# Patient Record
Sex: FEMALE | Race: BLACK OR AFRICAN AMERICAN | Employment: PART TIME | URBAN - METROPOLITAN AREA
[De-identification: names, ages, dates, MRNs, and addresses within clinical notes are randomized per-mention and may not be internally consistent; named-entity substitution may affect disease eponyms.]

---

## 2017-12-03 ENCOUNTER — HOSPITAL ENCOUNTER (EMERGENCY)
Age: 32
Discharge: HOME | End: 2017-12-03
Attending: EMERGENCY MEDICINE
Payer: SELF-PAY

## 2017-12-03 VITALS
DIASTOLIC BLOOD PRESSURE: 85 MMHG | BODY MASS INDEX: 48.82 KG/M2 | HEART RATE: 75 BPM | HEIGHT: 65 IN | WEIGHT: 293 LBS | OXYGEN SATURATION: 100 % | RESPIRATION RATE: 16 BRPM | TEMPERATURE: 98.6 F | SYSTOLIC BLOOD PRESSURE: 133 MMHG

## 2017-12-03 DIAGNOSIS — J03.90 ACUTE TONSILLITIS, UNSPECIFIED ETIOLOGY: Primary | ICD-10-CM

## 2017-12-03 PROCEDURE — 99283 EMERGENCY DEPT VISIT LOW MDM: CPT

## 2017-12-03 PROCEDURE — 74011250637 HC RX REV CODE- 250/637: Performed by: NURSE PRACTITIONER

## 2017-12-03 RX ORDER — DEXAMETHASONE SODIUM PHOSPHATE 10 MG/ML
10 INJECTION INTRAMUSCULAR; INTRAVENOUS ONCE
Status: COMPLETED | OUTPATIENT
Start: 2017-12-03 | End: 2017-12-03

## 2017-12-03 RX ORDER — AZITHROMYCIN 250 MG/1
TABLET, FILM COATED ORAL
Qty: 6 TAB | Refills: 0 | Status: SHIPPED | OUTPATIENT
Start: 2017-12-03 | End: 2017-12-08

## 2017-12-03 RX ORDER — TRIPROLIDINE/PSEUDOEPHEDRINE 2.5MG-60MG
400 TABLET ORAL
Qty: 1 BOTTLE | Refills: 0 | Status: SHIPPED | OUTPATIENT
Start: 2017-12-03 | End: 2019-09-24 | Stop reason: ALTCHOICE

## 2017-12-03 RX ADMIN — DEXAMETHASONE SODIUM PHOSPHATE 10 MG: 10 INJECTION, SOLUTION INTRAMUSCULAR; INTRAVENOUS at 19:10

## 2017-12-03 NOTE — ED NOTES
Emergency Department Nursing Plan of Care       The Nursing Plan of Care is developed from the Nursing assessment and Emergency Department Attending provider initial evaluation. The plan of care may be reviewed in the ED Provider note.     The Plan of Care was developed with the following considerations:   Patient / Family readiness to learn indicated by:verbalized understanding  Persons(s) to be included in education: patient  Barriers to Learning/Limitations:No    Signed     James Hilliard RN    12/3/2017   6:58 PM

## 2017-12-03 NOTE — LETTER
[facility] EMERGENCY DEPT 
[address] [place] [address] [address] 
[phone] Work/School Note Date: 12/3/2017 To Whom It May concern: 
 
Jerome Bullock was seen and treated today in the emergency room by the following provider(s): 
Attending Provider: Juan Luis Buckley MD 
Physician: Juan Luis Buckley MD 
Nurse Practitioner: Gaurav Everett NP. Jerome Bullock may return to work on  12-5. Sincerely, Gaurav Everett NP

## 2017-12-04 NOTE — ED PROVIDER NOTES
Patient is a 33 y.o. female presenting with sore throat. The history is provided by the patient. No  was used. Sore Throat    This is a new problem. The current episode started more than 2 days ago. The problem has not changed since onset. There has been no fever. Pertinent negatives include no headaches, no shortness of breath, no stridor and no swollen glands. She has had no exposure to strep. She has tried nothing for the symptoms. History reviewed. No pertinent past medical history. History reviewed. No pertinent surgical history. History reviewed. No pertinent family history. Social History     Social History    Marital status: SINGLE     Spouse name: N/A    Number of children: N/A    Years of education: N/A     Occupational History    Not on file. Social History Main Topics    Smoking status: Never Smoker    Smokeless tobacco: Not on file    Alcohol use Yes    Drug use: No    Sexual activity: Not on file     Other Topics Concern    Not on file     Social History Narrative         ALLERGIES: Lactose and Pcn [penicillins]    Review of Systems   Constitutional: Negative for fatigue and fever. HENT: Positive for sore throat. Respiratory: Negative for shortness of breath, wheezing and stridor. Cardiovascular: Negative for chest pain and palpitations. Gastrointestinal: Negative for abdominal pain. Musculoskeletal: Negative for arthralgias, myalgias, neck pain and neck stiffness. Skin: Negative for pallor and rash. Neurological: Negative for dizziness, tremors, weakness and headaches. Hematological: Negative for adenopathy. Psychiatric/Behavioral: Negative for agitation and behavioral problems. All other systems reviewed and are negative.       Vitals:    12/03/17 1835   BP: 133/85   Pulse: 75   Resp: 16   Temp: 98.6 °F (37 °C)   SpO2: 100%   Weight: 137.4 kg (303 lb)   Height: 5' 5\" (1.651 m)            Physical Exam   Constitutional: She is oriented to person, place, and time. She appears well-developed and well-nourished. No distress. HENT:   Head: Normocephalic and atraumatic. Right Ear: External ear normal.   Left Ear: External ear normal.   Nose: Nose normal.   Mouth/Throat: Uvula is midline. Posterior oropharyngeal erythema present. No oropharyngeal exudate. Tonsils 2+   Eyes: Conjunctivae are normal.   Neck: Normal range of motion. Neck supple. Cardiovascular: Normal rate, regular rhythm and normal heart sounds. Pulmonary/Chest: Effort normal and breath sounds normal. No respiratory distress. She has no wheezes. Abdominal: Soft. Bowel sounds are normal. There is no tenderness. Musculoskeletal: Normal range of motion. Lymphadenopathy:     She has no cervical adenopathy. Neurological: She is alert and oriented to person, place, and time. No cranial nerve deficit. Coordination normal.   Skin: Skin is warm and dry. No rash noted. Psychiatric: She has a normal mood and affect. Her behavior is normal. Judgment and thought content normal.   Nursing note and vitals reviewed. MDM  Number of Diagnoses or Management Options  Acute tonsillitis, unspecified etiology:   Diagnosis management comments: DDX tonsillitis strep pharyngitis peritonsillar abscess       Amount and/or Complexity of Data Reviewed  Discuss the patient with other providers: yes      ED Course       Procedures    Pt has been reevaluated. There are no new complaints, changes, or physical findings at this time. Medications have been reviewed w/ pt and/or family. Pt and/or family's questions have been answered. Pt and/or family expressed good understanding of the dx/tx/rx and is in agreement with plan of care. Pt instructed and agreed to f/u w/ PCP  and to return to ED upon further deterioration. Pt is ready for discharge. LABORATORY TESTS:  No results found for this or any previous visit (from the past 12 hour(s)).     IMAGING RESULTS:  No orders to display     No results found. MEDICATIONS GIVEN:  Medications   dexamethasone (PF) (DECADRON) injection 10 mg (10 mg Oral Given 12/3/17 1910)       IMPRESSION:  1. Acute tonsillitis, unspecified etiology        PLAN:  1. Discharge Medication List as of 12/3/2017  7:49 PM      START taking these medications    Details   azithromycin (ZITHROMAX Z-ELIN) 250 mg tablet z pack as directed, Print, Disp-6 Tab, R-0      ibuprofen (ADVIL;MOTRIN) 100 mg/5 mL suspension Take 20 mL by mouth every six (6) hours as needed. , Print, Disp-1 Bottle, R-0         CONTINUE these medications which have NOT CHANGED    Details   naproxen (NAPROSYN) 500 mg tablet Take 1 Tab by mouth every twelve (12) hours as needed for Pain., Print, Disp-20 Tab, R-0      HYDROcodone-acetaminophen (NORCO) 5-325 mg per tablet Take 1 Tab by mouth every four (4) hours as needed for Pain. Max Daily Amount: 6 Tabs., Print, Disp-6 Tab, R-0           2.    Follow-up Information     Follow up With Details Comments [address] [facility] DEPT OF OTOLARYNGOLOGY In 2 days  [address] [place] [address] [address]  [phone]            Return to ED if worse

## 2017-12-04 NOTE — DISCHARGE INSTRUCTIONS
Tonsillitis: Care Instructions  Your Care Instructions    Tonsillitis is an infection of the tonsils that is caused by bacteria or a virus. The tonsils are in the back of the throat and are part of the immune system. Tonsillitis typically lasts from a few days up to a couple of weeks. Tonsillitis caused by a virus goes away on its own. Tonsillitis caused by the bacteria that causes strep throat is treated with antibiotics. You and your doctor may consider surgery to remove the tonsils (tonsillectomy) if you have serious complications or repeat infections. Follow-up care is a key part of your treatment and safety. Be sure to make and go to all appointments, and call your doctor if you are having problems. It's also a good idea to know your test results and keep a list of the medicines you take. How can you care for yourself at home? · If your doctor prescribed antibiotics, take them as directed. Do not stop taking them just because you feel better. You need to take the full course of antibiotics. · Gargle with warm salt water. This helps reduce swelling and relieve discomfort. Gargle once an hour with 1 teaspoon of salt mixed in 8 fluid ounces of warm water. · Take an over-the-counter pain medicine, such as acetaminophen (Tylenol), ibuprofen (Advil, Motrin), or naproxen (Aleve). Be safe with medicines. Read and follow all instructions on the label. No one younger than 20 should take aspirin. It has been linked to Reye syndrome, a serious illness. · Be careful when taking over-the-counter cold or flu medicines and Tylenol at the same time. Many of these medicines have acetaminophen, which is Tylenol. Read the labels to make sure that you are not taking more than the recommended dose. Too much acetaminophen (Tylenol) can be harmful. · Try an over-the-counter throat spray to relieve throat pain. · Drink plenty of fluids. Fluids may help soothe an irritated throat.  Drink warm or cool liquids (whichever feels better). These include tea, soup, and juice. · Do not smoke, and avoid secondhand smoke. Smoking can make tonsillitis worse. If you need help quitting, talk to your doctor about stop-smoking programs and medicines. These can increase your chances of quitting for good. · Use a vaporizer or humidifier to add moisture to your bedroom. Follow the directions for cleaning the machine. When should you call for help? Call your doctor now or seek immediate medical care if:  ? · Your pain gets worse on one side of your throat. ? · You have a new or higher fever. ? · You notice changes in your voice. ? · You have trouble opening your mouth. ? · You have any trouble breathing. ? · You have much more trouble swallowing. ? · You have a fever with a stiff neck or a severe headache. ? · You are sensitive to light or feel very sleepy or confused. ? Watch closely for changes in your health, and be sure to contact your doctor if:  ? · You do not get better after 2 days. Where can you learn more? Go to http://candice-jessica.info/. Enter M253 in the search box to learn more about \"Tonsillitis: Care Instructions. \"  Current as of: May 12, 2017  Content Version: 11.4  © 7151-8796 [employer], Incorporated. Care instructions adapted under license by [employer] (which disclaims liability or warranty for this information). If you have questions about a medical condition or this instruction, always ask your healthcare professional. [place] [address] any warranty or liability for your use of this information.

## 2019-05-16 ENCOUNTER — HOSPITAL ENCOUNTER (EMERGENCY)
Age: 34
Discharge: HOME | End: 2019-05-17
Attending: EMERGENCY MEDICINE
Payer: SELF-PAY

## 2019-05-16 VITALS
HEART RATE: 104 BPM | TEMPERATURE: 98.4 F | OXYGEN SATURATION: 96 % | WEIGHT: 293 LBS | SYSTOLIC BLOOD PRESSURE: 158 MMHG | DIASTOLIC BLOOD PRESSURE: 101 MMHG | HEIGHT: 65 IN | RESPIRATION RATE: 20 BRPM | BODY MASS INDEX: 48.82 KG/M2

## 2019-05-16 DIAGNOSIS — J45.21 MILD INTERMITTENT REACTIVE AIRWAY DISEASE WITH ACUTE EXACERBATION: ICD-10-CM

## 2019-05-16 DIAGNOSIS — J20.9 ACUTE BRONCHITIS, UNSPECIFIED ORGANISM: ICD-10-CM

## 2019-05-16 DIAGNOSIS — J01.00 ACUTE NON-RECURRENT MAXILLARY SINUSITIS: Primary | ICD-10-CM

## 2019-05-16 PROCEDURE — 74011636637 HC RX REV CODE- 636/637: Performed by: EMERGENCY MEDICINE

## 2019-05-16 PROCEDURE — 74011000250 HC RX REV CODE- 250: Performed by: EMERGENCY MEDICINE

## 2019-05-16 PROCEDURE — 74011250637 HC RX REV CODE- 250/637: Performed by: EMERGENCY MEDICINE

## 2019-05-16 PROCEDURE — 77030029684 HC NEB SM VOL KT MONA -A

## 2019-05-16 PROCEDURE — 99283 EMERGENCY DEPT VISIT LOW MDM: CPT

## 2019-05-16 PROCEDURE — 94640 AIRWAY INHALATION TREATMENT: CPT

## 2019-05-16 RX ORDER — BENZONATATE 100 MG/1
200 CAPSULE ORAL
Status: DISCONTINUED | OUTPATIENT
Start: 2019-05-16 | End: 2019-05-17 | Stop reason: HOSPADM

## 2019-05-16 RX ORDER — PREDNISONE 20 MG/1
60 TABLET ORAL
Status: COMPLETED | OUTPATIENT
Start: 2019-05-16 | End: 2019-05-16

## 2019-05-16 RX ORDER — IPRATROPIUM BROMIDE AND ALBUTEROL SULFATE 2.5; .5 MG/3ML; MG/3ML
6 SOLUTION RESPIRATORY (INHALATION)
Status: COMPLETED | OUTPATIENT
Start: 2019-05-16 | End: 2019-05-16

## 2019-05-16 RX ADMIN — PREDNISONE 60 MG: 20 TABLET ORAL at 23:57

## 2019-05-16 RX ADMIN — BENZONATATE 200 MG: 100 CAPSULE ORAL at 23:57

## 2019-05-16 RX ADMIN — IPRATROPIUM BROMIDE AND ALBUTEROL SULFATE 6 ML: .5; 3 SOLUTION RESPIRATORY (INHALATION) at 23:33

## 2019-05-16 NOTE — LETTER
[facility] EMERGENCY DEPT 
[address] [place] [address] [phone] Work/School Note Date: 5/16/2019 To Whom It May concern: 
 
Xochitl Calloway was seen and treated today in the emergency room by the following provider(s): 
Attending Provider: Marylen Counter, MD. Xochitl Calloway may return to work on 5/19/19. Sincerely, Era Mauro MD

## 2019-05-17 RX ORDER — CETIRIZINE HYDROCHLORIDE, PSEUDOEPHEDRINE HYDROCHLORIDE 5; 120 MG/1; MG/1
1 TABLET, FILM COATED, EXTENDED RELEASE ORAL 2 TIMES DAILY
Qty: 30 TAB | Refills: 0 | Status: SHIPPED | OUTPATIENT
Start: 2019-05-17 | End: 2019-09-24 | Stop reason: ALTCHOICE

## 2019-05-17 RX ORDER — BENZONATATE 200 MG/1
200 CAPSULE ORAL
Qty: 12 CAP | Refills: 0 | Status: SHIPPED | OUTPATIENT
Start: 2019-05-17 | End: 2019-05-24

## 2019-05-17 RX ORDER — PREDNISONE 20 MG/1
60 TABLET ORAL DAILY
Qty: 15 TAB | Refills: 0 | Status: SHIPPED | OUTPATIENT
Start: 2019-05-17 | End: 2019-05-22

## 2019-05-17 RX ORDER — IBUPROFEN 800 MG/1
800 TABLET ORAL
Qty: 20 TAB | Refills: 0 | Status: SHIPPED | OUTPATIENT
Start: 2019-05-17 | End: 2019-05-24

## 2019-05-17 RX ORDER — AZITHROMYCIN 250 MG/1
TABLET, FILM COATED ORAL
Qty: 6 TAB | Refills: 0 | Status: SHIPPED | OUTPATIENT
Start: 2019-05-17 | End: 2019-09-24 | Stop reason: ALTCHOICE

## 2019-05-17 RX ORDER — ALBUTEROL SULFATE 90 UG/1
2 AEROSOL, METERED RESPIRATORY (INHALATION)
Qty: 1 INHALER | Refills: 0 | Status: SHIPPED | OUTPATIENT
Start: 2019-05-17 | End: 2019-09-24 | Stop reason: ALTCHOICE

## 2019-05-17 NOTE — DISCHARGE INSTRUCTIONS
Patient Education        Bronchitis: Care Instructions  Your Care Instructions    Bronchitis is inflammation of the bronchial tubes, which carry air to the lungs. The tubes swell and produce mucus, or phlegm. The mucus and inflamed bronchial tubes make you cough. You may have trouble breathing. Most cases of bronchitis are caused by viruses like those that cause colds. Antibiotics usually do not help and they may be harmful. Bronchitis usually develops rapidly and lasts about 2 to 3 weeks in otherwise healthy people. Follow-up care is a key part of your treatment and safety. Be sure to make and go to all appointments, and call your doctor if you are having problems. It's also a good idea to know your test results and keep a list of the medicines you take. How can you care for yourself at home? · Take all medicines exactly as prescribed. Call your doctor if you think you are having a problem with your medicine. · Get some extra rest.  · Take an over-the-counter pain medicine, such as acetaminophen (Tylenol), ibuprofen (Advil, Motrin), or naproxen (Aleve) to reduce fever and relieve body aches. Read and follow all instructions on the label. · Do not take two or more pain medicines at the same time unless the doctor told you to. Many pain medicines have acetaminophen, which is Tylenol. Too much acetaminophen (Tylenol) can be harmful. · Take an over-the-counter cough medicine that contains dextromethorphan to help quiet a dry, hacking cough so that you can sleep. Avoid cough medicines that have more than one active ingredient. Read and follow all instructions on the label. · Breathe moist air from a humidifier, hot shower, or sink filled with hot water. The heat and moisture will thin mucus so you can cough it out. · Do not smoke. Smoking can make bronchitis worse. If you need help quitting, talk to your doctor about stop-smoking programs and medicines.  These can increase your chances of quitting for good.  When should you call for help? Call 911 anytime you think you may need emergency care. For example, call if:    · You have severe trouble breathing.    Call your doctor now or seek immediate medical care if:    · You have new or worse trouble breathing.     · You cough up dark brown or bloody mucus (sputum).     · You have a new or higher fever.     · You have a new rash.    Watch closely for changes in your health, and be sure to contact your doctor if:    · You cough more deeply or more often, especially if you notice more mucus or a change in the color of your mucus.     · You are not getting better as expected. Where can you learn more? Go to http://candiceYummlyjessica.info/. Enter H333 in the search box to learn more about \"Bronchitis: Care Instructions. \"  Current as of: September 5, 2018  Content Version: 11.9  © 0062-9798 [employer]. Care instructions adapted under license by [employer] (which disclaims liability or warranty for this information). If you have questions about a medical condition or this instruction, always ask your healthcare professional. [place] [address] any warranty or liability for your use of this information. Patient Education        Sinusitis: Care Instructions  Your Care Instructions    Sinusitis is an infection of the lining of the sinus cavities in your head. Sinusitis often follows a cold. It causes pain and pressure in your head and face. In most cases, sinusitis gets better on its own in 1 to 2 weeks. But some mild symptoms may last for several weeks. Sometimes antibiotics are needed. Follow-up care is a key part of your treatment and safety. Be sure to make and go to all appointments, and call your doctor if you are having problems. It's also a good idea to know your test results and keep a list of the medicines you take. How can you care for yourself at home?   · Take an over-the-counter pain medicine, such as acetaminophen (Tylenol), ibuprofen (Advil, Motrin), or naproxen (Aleve). Read and follow all instructions on the label. · If the doctor prescribed antibiotics, take them as directed. Do not stop taking them just because you feel better. You need to take the full course of antibiotics. · Be careful when taking over-the-counter cold or flu medicines and Tylenol at the same time. Many of these medicines have acetaminophen, which is Tylenol. Read the labels to make sure that you are not taking more than the recommended dose. Too much acetaminophen (Tylenol) can be harmful. · Breathe warm, moist air from a steamy shower, a hot bath, or a sink filled with hot water. Avoid cold, dry air. Using a humidifier in your home may help. Follow the directions for cleaning the machine. · Use saline (saltwater) nasal washes to help keep your nasal passages open and wash out mucus and bacteria. You can buy saline nose drops at a grocery store or drugstore. Or you can make your own at home by adding 1 teaspoon of salt and 1 teaspoon of baking soda to 2 cups of distilled water. If you make your own, fill a bulb syringe with the solution, insert the tip into your nostril, and squeeze gently. Nathalie Males your nose. · Put a hot, wet towel or a warm gel pack on your face 3 or 4 times a day for 5 to 10 minutes each time. · Try a decongestant nasal spray like oxymetazoline (Afrin). Do not use it for more than 3 days in a row. Using it for more than 3 days can make your congestion worse. When should you call for help?   Call your doctor now or seek immediate medical care if:    · You have new or worse swelling or redness in your face or around your eyes.     · You have a new or higher fever.    Watch closely for changes in your health, and be sure to contact your doctor if:    · You have new or worse facial pain.     · The mucus from your nose becomes thicker (like pus) or has new blood in it.     · You are not getting better as expected. Where can you learn more? Go to http://candice-jessica.info/. Enter N058 in the search box to learn more about \"Sinusitis: Care Instructions. \"  Current as of: March 27, 2018  Content Version: 11.9  © 2085-0960 [employer]. Care instructions adapted under license by [employer] (which disclaims liability or warranty for this information). If you have questions about a medical condition or this instruction, always ask your healthcare professional. [place] [address] any warranty or liability for your use of this information. Patient Education        Reactive Airway Disease: Care Instructions  Your Care Instructions    Reactive airway disease is a breathing problem that appears as wheezing, a whistling noise in your airways. It may be caused by a viral or bacterial infection, allergies, tobacco smoke, or something else in the environment. When you are around these triggers, your body releases chemicals that make the airways get tight. Reactive airway disease is a lot like asthma. Both can cause wheezing. But asthma is ongoing, while reactive airway disease may occur only now and then. Tests can be done to tell whether you have asthma. You may take the same medicines used to treat asthma. Good home care and follow-up care with your doctor can help you recover. Follow-up care is a key part of your treatment and safety. Be sure to make and go to all appointments, and call your doctor if you are having problems. It's also a good idea to know your test results and keep a list of the medicines you take. How can you care for yourself at home? · Take your medicines exactly as prescribed. Call your doctor if you think you are having a problem with your medicine. · Do not smoke or allow others to smoke around you. If you need help quitting, talk to your doctor about stop-smoking programs and medicines.  These can increase your chances of quitting for good.  · If you know what caused your wheezing (such as perfume or the odor of household chemicals), try to avoid it in the future. · Wash your hands several times a day, and consider using hand gels or wipes that contain alcohol. This can prevent colds and other infections. When should you call for help? Call 911 anytime you think you may need emergency care. For example, call if:    · You have severe trouble breathing.    Watch closely for changes in your health, and be sure to contact your doctor if:    · You cough up yellow, dark brown, or bloody mucus.     · You have a fever.     · Your wheezing gets worse. Where can you learn more? Go to http://candice-jessica.info/. Enter L627 in the search box to learn more about \"Reactive Airway Disease: Care Instructions. \"  Current as of: September 5, 2018  Content Version: 11.9  © 6024-4945 [employer]. Care instructions adapted under license by [employer] (which disclaims liability or warranty for this information). If you have questions about a medical condition or this instruction, always ask your healthcare professional. [address] any warranty or liability for your use of this information.

## 2019-05-17 NOTE — ED PROVIDER NOTES
52-year-old female with a history of bronchitis presents with chief complaint of coughing up cold since yesterday. She states last week she began to have sneezing and upper respiratory congestion then developed a cough yesterday. The cough is productive of mucus which was clear yesterday and today is yellow. She also has associated wheeze and subjective fevers. Also reports a slight headache and slight chest pain on coughing. She does have a cyst history of seasonal allergies but states she is out of her Zyrtec. No past medical history on file. No past surgical history on file. No family history on file. Social History Socioeconomic History  Marital status: SINGLE Spouse name: Not on file  Number of children: Not on file  Years of education: Not on file  Highest education level: Not on file Occupational History  Not on file Social Needs  Financial resource strain: Not on file  Food insecurity:  
  Worry: Not on file Inability: Not on file  Transportation needs:  
  Medical: Not on file Non-medical: Not on file Tobacco Use  Smoking status: Never Smoker Substance and Sexual Activity  Alcohol use: Yes  Drug use: No  
 Sexual activity: Not on file Lifestyle  Physical activity:  
  Days per week: Not on file Minutes per session: Not on file  Stress: Not on file Relationships  Social connections:  
  Talks on phone: Not on file Gets together: Not on file Attends [employer] service: Not on file Active member of club or organization: Not on file Attends meetings of clubs or organizations: Not on file Relationship status: Not on file  Intimate partner violence:  
  Fear of current or ex partner: Not on file Emotionally abused: Not on file Physically abused: Not on file Forced sexual activity: Not on file Other Topics Concern  Not on file Social History Narrative  Not on file ALLERGIES: Lactose and Pcn [penicillins] Review of Systems Constitutional: Negative. Negative for chills, fever and unexpected weight change. HENT: Positive for congestion, rhinorrhea, sinus pressure and sinus pain. Negative for trouble swallowing. Eyes: Negative for discharge. Respiratory: Positive for cough, shortness of breath and wheezing. Negative for chest tightness. Cardiovascular: Negative. Negative for chest pain. Gastrointestinal: Negative. Negative for abdominal distention, abdominal pain, constipation, diarrhea and nausea. Endocrine: Negative. Genitourinary: Negative. Negative for difficulty urinating, dysuria, frequency and urgency. Musculoskeletal: Negative. Negative for arthralgias and myalgias. Skin: Negative. Negative for color change. Allergic/Immunologic: Negative. Neurological: Negative. Negative for dizziness, speech difficulty and headaches. Hematological: Negative. Psychiatric/Behavioral: Negative. Negative for agitation and confusion. All other systems reviewed and are negative. Vitals:  
 05/16/19 2308 BP: (!) 158/101 Pulse: (!) 104 Resp: 20 Temp: 98.4 °F (36.9 °C) SpO2: 96% Weight: 139 kg (306 lb 8 oz) Height: 5' 5\" (1.651 m) Physical Exam  
HENT:  
Rhinorrhea and left maxillary sinus tenderness. Pulmonary/Chest:  
Decreased air movement by laterally with diffuse expiratory wheeze. MDM Number of Diagnoses or Management Options Diagnosis management comments: URI, bronchitis, sinusitis, reactive airway disease. ED Course as of May 17 0647 Fri May 17, 2019  
0037 Feeling better. On re-auscultation, wheezes significantly decreased and air movement increased. [SS] ED Course User Index 
[SS] Josephine Perera MD  
 
 
Procedures LABORATORY TESTS: 
No results found for this or any previous visit (from the past 12 hour(s)). IMAGING RESULTS: 
No orders to display MEDICATIONS GIVEN: 
 Medications  
predniSONE (DELTASONE) tablet 60 mg (60 mg Oral Given 5/16/19 4595)  
albuterol-ipratropium (DUO-NEB) 2.5 MG-0.5 MG/3 ML (6 mL Nebulization Given 5/16/19 3626) IMPRESSION: 
1. Acute non-recurrent maxillary sinusitis 2. Acute bronchitis, unspecified organism 3. Mild intermittent reactive airway disease with acute exacerbation PLAN: 
1. Discharge Medication List as of 5/17/2019 12:51 AM  
  
START taking these medications Details  
cetirizine-pseudoePHEDrine (ZYRTEC-D) 5-120 mg Tb12 Take 1 Tab by mouth two (2) times a day., Print, Disp-30 Tab, R-0  
  
predniSONE (DELTASONE) 20 mg tablet Take 60 mg by mouth daily for 5 days. , Print, Disp-15 Tab, R-0  
  
albuterol (PROVENTIL HFA, VENTOLIN HFA, PROAIR HFA) 90 mcg/actuation inhaler Take 2 Puffs by inhalation every four (4) hours as needed for Wheezing., Print, Disp-1 Inhaler, R-0  
  
benzonatate (TESSALON) 200 mg capsule Take 1 Cap by mouth three (3) times daily as needed for Cough for up to 7 days. , Print, Disp-12 Cap, R-0  
  
ibuprofen (MOTRIN) 800 mg tablet Take 1 Tab by mouth every six (6) hours as needed for Pain for up to 7 days. , Print, Disp-20 Tab, R-0  
  
azithromycin (ZITHROMAX Z-ELIN) 250 mg tablet 2 tabs by mouth day 1, then 1 tab by mouth daily on days 2-5, Print, Disp-6 Tab, R-0  
  
  
CONTINUE these medications which have NOT CHANGED Details  
ibuprofen (ADVIL;MOTRIN) 100 mg/5 mL suspension Take 20 mL by mouth every six (6) hours as needed. , Print, Disp-1 Bottle, R-0  
  
naproxen (NAPROSYN) 500 mg tablet Take 1 Tab by mouth every twelve (12) hours as needed for Pain., Print, Disp-20 Tab, R-0  
  
HYDROcodone-acetaminophen (NORCO) 5-325 mg per tablet Take 1 Tab by mouth every four (4) hours as needed for Pain. Max Daily Amount: 6 Tabs., Print, Disp-6 Tab, R-0  
  
  
 
2. Follow-up Information Follow up With Specialties Details Why Contact Info [facility]  Schedule an appointment as soon as possible for a visit As needed [address] [facility], [address] [place] [address] 
[phone] Return to ED if worse

## 2019-05-17 NOTE — ED NOTES
Patient  given copy of dc instructions and 6 script(s). Patient  verbalized understanding of instructions and script (s). Patient given a current medication reconciliation form and verbalized understanding of their medications. Patient  verbalized understanding of the importance of discussing medications with  his or her physician or clinic they will be following up with. Patient alert and oriented and in no acute distress. Patient discharged home ambulatory.

## 2019-05-17 NOTE — ED NOTES
Pt arrived to ED with c/o cough x 1 week. Pt states it started as a \"head cold\" but now its in her chest.  Pt is in no acute distress. Will continue to monitor. See nursing assessment. Safety precautions in place; call light within reach. Emergency Department Nursing Plan of Care The Nursing Plan of Care is developed from the Nursing assessment and Emergency Department Attending provider initial evaluation. The plan of care may be reviewed in the ED Provider note. The Plan of Care was developed with the following considerations:  
Patient / Family readiness to learn indicated by:verbalized understanding Persons(s) to be included in education: patient Barriers to Learning/Limitations:No 
 
Signed Nanci Alejandre RN   
5/16/2019   11:31 PM

## 2019-09-05 ENCOUNTER — APPOINTMENT (OUTPATIENT)
Dept: GENERAL RADIOLOGY | Age: 34
End: 2019-09-05
Attending: PHYSICIAN ASSISTANT
Payer: MEDICAID

## 2019-09-05 ENCOUNTER — HOSPITAL ENCOUNTER (EMERGENCY)
Age: 34
Discharge: HOME | End: 2019-09-05
Attending: EMERGENCY MEDICINE
Payer: MEDICAID

## 2019-09-05 VITALS
TEMPERATURE: 97.9 F | DIASTOLIC BLOOD PRESSURE: 83 MMHG | SYSTOLIC BLOOD PRESSURE: 149 MMHG | HEART RATE: 97 BPM | WEIGHT: 293 LBS | RESPIRATION RATE: 18 BRPM | BODY MASS INDEX: 48.82 KG/M2 | HEIGHT: 65 IN | OXYGEN SATURATION: 97 %

## 2019-09-05 DIAGNOSIS — M47.814 SPONDYLOSIS OF THORACIC REGION WITHOUT MYELOPATHY OR RADICULOPATHY: Primary | ICD-10-CM

## 2019-09-05 LAB — HCG UR QL: NEGATIVE

## 2019-09-05 PROCEDURE — 72070 X-RAY EXAM THORAC SPINE 2VWS: CPT

## 2019-09-05 PROCEDURE — 99283 EMERGENCY DEPT VISIT LOW MDM: CPT

## 2019-09-05 PROCEDURE — 81025 URINE PREGNANCY TEST: CPT

## 2019-09-05 PROCEDURE — 74011250637 HC RX REV CODE- 250/637: Performed by: PHYSICIAN ASSISTANT

## 2019-09-05 RX ORDER — CYCLOBENZAPRINE HCL 10 MG
10 TABLET ORAL
Status: COMPLETED | OUTPATIENT
Start: 2019-09-05 | End: 2019-09-05

## 2019-09-05 RX ORDER — PREDNISONE 10 MG/1
TABLET ORAL
Qty: 21 TAB | Refills: 0 | Status: SHIPPED | OUTPATIENT
Start: 2019-09-05 | End: 2019-09-24 | Stop reason: ALTCHOICE

## 2019-09-05 RX ORDER — CYCLOBENZAPRINE HCL 10 MG
10 TABLET ORAL
Qty: 15 TAB | Refills: 0 | Status: SHIPPED | OUTPATIENT
Start: 2019-09-05 | End: 2019-09-24 | Stop reason: ALTCHOICE

## 2019-09-05 RX ADMIN — CYCLOBENZAPRINE HYDROCHLORIDE 10 MG: 10 TABLET, FILM COATED ORAL at 21:55

## 2019-09-05 NOTE — LETTER
[facility] EMERGENCY DEPT 
[address] [facility] [address] 
[phone] Work/School Note Date: 9/5/2019 To Whom It May concern: 
 
Axel De Dios was seen and treated today in the emergency room by the following provider(s): 
Attending Provider: John Arteaga MD 
Physician Assistant: Robbie Baumgarten, PA. Please excuse her from work on September 6 and 7.  
 
Sincerely, 
 
 
 
 
JERI Escamilla

## 2019-09-06 NOTE — ED NOTES
Pt arrived to ED via ambulatory with c/o back pain \"for a very long time\". Pt. Denies injury/trauma. Lungs clear. Pt is in no acute distress. Will continue to monitor. See nursing assessment. Safety precautions in place; call light within reach. Emergency Department Nursing Plan of Care       The Nursing Plan of Care is developed from the Nursing assessment and Emergency Department Attending provider initial evaluation. The plan of care may be reviewed in the ED Provider note.     The Plan of Care was developed with the following considerations:   Patient / Family readiness to learn indicated by:verbalized understanding  Persons(s) to be included in education: patient  Barriers to Learning/Limitations:No    Signed     Laci Batres RN    9/5/2019   9:29 PM

## 2019-09-06 NOTE — DISCHARGE INSTRUCTIONS
Patient Education        Back Pain: Care Instructions  Your Care Instructions    Back pain has many possible causes. It is often related to problems with muscles and ligaments of the back. It may also be related to problems with the nerves, discs, or bones of the back. Moving, lifting, standing, sitting, or sleeping in an awkward way can strain the back. Sometimes you don't notice the injury until later. Arthritis is another common cause of back pain. Although it may hurt a lot, back pain usually improves on its own within several weeks. Most people recover in 12 weeks or less. Using good home treatment and being careful not to stress your back can help you feel better sooner. Follow-up care is a key part of your treatment and safety. Be sure to make and go to all appointments, and call your doctor if you are having problems. It's also a good idea to know your test results and keep a list of the medicines you take. How can you care for yourself at home? · Sit or lie in positions that are most comfortable and reduce your pain. Try one of these positions when you lie down:  ? Lie on your back with your knees bent and supported by large pillows. ? Lie on the floor with your legs on the seat of a sofa or chair. ? Lie on your side with your knees and hips bent and a pillow between your legs. ? Lie on your stomach if it does not make pain worse. · Do not sit up in bed, and avoid soft couches and twisted positions. Bed rest can help relieve pain at first, but it delays healing. Avoid bed rest after the first day of back pain. · Change positions every 30 minutes. If you must sit for long periods of time, take breaks from sitting. Get up and walk around, or lie in a comfortable position. · Try using a heating pad on a low or medium setting for 15 to 20 minutes every 2 or 3 hours. Try a warm shower in place of one session with the heating pad. · You can also try an ice pack for 10 to 15 minutes every 2 to 3 hours. Put a thin cloth between the ice pack and your skin. · Take pain medicines exactly as directed. ? If the doctor gave you a prescription medicine for pain, take it as prescribed. ? If you are not taking a prescription pain medicine, ask your doctor if you can take an over-the-counter medicine. · Take short walks several times a day. You can start with 5 to 10 minutes, 3 or 4 times a day, and work up to longer walks. Walk on level surfaces and avoid hills and stairs until your back is better. · Return to work and other activities as soon as you can. Continued rest without activity is usually not good for your back. · To prevent future back pain, do exercises to stretch and strengthen your back and stomach. Learn how to use good posture, safe lifting techniques, and proper body mechanics. When should you call for help? Call your doctor now or seek immediate medical care if:    · You have new or worsening numbness in your legs.     · You have new or worsening weakness in your legs. (This could make it hard to stand up.)     · You lose control of your bladder or bowels.    Watch closely for changes in your health, and be sure to contact your doctor if:    · You have a fever, lose weight, or don't feel well.     · You do not get better as expected. Where can you learn more? Go to http://candice-jessica.info/. Enter P925 in the search box to learn more about \"Back Pain: Care Instructions. \"  Current as of: September 20, 2018  Content Version: 12.1  © 8707-0966 [employer]. Care instructions adapted under license by [employer] (which disclaims liability or warranty for this information). If you have questions about a medical condition or this instruction, always ask your healthcare professional. [place] [address] any warranty or liability for your use of this information.

## 2019-09-06 NOTE — ED PROVIDER NOTES
EMERGENCY DEPARTMENT HISTORY AND PHYSICAL EXAM      Date: 9/5/2019  Patient Name: Axel De Dios    History of Presenting Illness     Chief Complaint   Patient presents with    Back Pain       History Provided By: Patient    HPI: Axel De Dios, 36 y.o. female with no pertinent past medical history, presents to the ED with cc of right low back pain. The patient reports that approximately 3 years ago, she bent over while she was doing work and felt a pop in her back. Since then, she has had intermittent pain to the area. Her current flare has been present for the last 3 months. She has been taking ibuprofen with mild relief of pain. The pain is worse with movement and twisting. She sometimes notices that the pain shoots into her right leg. She denies bowel or bladder dysfunction, saddle anesthesia, lower extremity numbness or weakness, urinary symptoms. There are no other complaints, changes, or physical findings at this time. PCP: None    No current facility-administered medications on file prior to encounter. Current Outpatient Medications on File Prior to Encounter   Medication Sig Dispense Refill    cetirizine-pseudoePHEDrine (ZYRTEC-D) 5-120 mg Tb12 Take 1 Tab by mouth two (2) times a day. 30 Tab 0    albuterol (PROVENTIL HFA, VENTOLIN HFA, PROAIR HFA) 90 mcg/actuation inhaler Take 2 Puffs by inhalation every four (4) hours as needed for Wheezing. 1 Inhaler 0    azithromycin (ZITHROMAX Z-ELIN) 250 mg tablet 2 tabs by mouth day 1, then 1 tab by mouth daily on days 2-5 6 Tab 0    ibuprofen (ADVIL;MOTRIN) 100 mg/5 mL suspension Take 20 mL by mouth every six (6) hours as needed. 1 Bottle 0    naproxen (NAPROSYN) 500 mg tablet Take 1 Tab by mouth every twelve (12) hours as needed for Pain. 20 Tab 0    HYDROcodone-acetaminophen (NORCO) 5-325 mg per tablet Take 1 Tab by mouth every four (4) hours as needed for Pain. Max Daily Amount: 6 Tabs.  6 Tab 0       Past History     Past Medical History:  History reviewed. No pertinent past medical history. Past Surgical History:  History reviewed. No pertinent surgical history. Family History:  History reviewed. No pertinent family history. Social History:  Social History     Tobacco Use    Smoking status: Never Smoker    Smokeless tobacco: Never Used   Substance Use Topics    Alcohol use: Yes     Comment: occasionally    Drug use: No       Allergies: Allergies   Allergen Reactions    Lactose Unknown (comments)    Pcn [Penicillins] Itching         Review of Systems   Review of Systems   Constitutional: Negative for chills and fever. HENT: Negative for ear pain and sore throat. Eyes: Negative for redness and visual disturbance. Respiratory: Negative for cough and shortness of breath. Cardiovascular: Negative for chest pain and palpitations. Gastrointestinal: Negative for abdominal pain, nausea and vomiting. Genitourinary: Negative for dysuria and hematuria. Musculoskeletal: Positive for back pain. Negative for gait problem. Skin: Negative for rash and wound. Neurological: Negative for dizziness and headaches. Psychiatric/Behavioral: Negative for behavioral problems and confusion. All other systems reviewed and are negative. Physical Exam   Physical Exam   Constitutional: She is oriented to person, place, and time. She appears well-developed and well-nourished. Well-appearing, interactive female in no apparent distress. HENT:   Head: Normocephalic and atraumatic. Eyes: Pupils are equal, round, and reactive to light. Conjunctivae and EOM are normal.   Neck: Normal range of motion. Neck supple. Cardiovascular: Normal rate, regular rhythm and normal heart sounds. Pulmonary/Chest: Effort normal and breath sounds normal. No respiratory distress. She has no wheezes. She has no rales. Musculoskeletal: Normal range of motion. Thoracic back: She exhibits tenderness (right lower thoracic paraspinal muscles).  She exhibits normal range of motion, no bony tenderness, no edema and no deformity. No overlying rashes to the area of pain. Neurological: She is alert and oriented to person, place, and time. She has normal strength. No cranial nerve deficit or sensory deficit. GCS eye subscore is 4. GCS verbal subscore is 5. GCS motor subscore is 6. Skin: Skin is warm and dry. No rash noted. Psychiatric: She has a normal mood and affect. Her behavior is normal.   Nursing note and vitals reviewed. Diagnostic Study Results     Labs -     Recent Results (from the past 12 hour(s))   HCG URINE, QL. - POC    Collection Time: 09/05/19  9:53 PM   Result Value Ref Range    Pregnancy test,urine (POC) NEGATIVE  NEG         Radiologic Studies -   XR SPINE THORAC 2 V   Final Result   IMPRESSION: No acute osseous abnormality. Mild multilevel degenerative   spondylosis. CT Results  (Last 48 hours)    None        CXR Results  (Last 48 hours)    None            Medical Decision Making   I am the first provider for this patient. I reviewed the vital signs, available nursing notes, past medical history, past surgical history, family history and social history. Vital Signs-Reviewed the patient's vital signs. Patient Vitals for the past 12 hrs:   Temp Pulse Resp BP SpO2   09/05/19 2131     97 %   09/05/19 2123 97.9 °F (36.6 °C) 97 18 149/83 97 %         Records Reviewed: Nursing Notes and Old Medical Records    Provider Notes (Medical Decision Making):   Patient presents with right lower thoracic pain. She has no red flag signs and neurological exam is intact. Differential diagnosis includes degenerative disc disease, osteoarthritis, muscle strain. Given duration of symptoms, plan to obtain x-ray and treat with muscle relaxer. ED Course:   Initial assessment performed. The patients presenting problems have been discussed, and they are in agreement with the care plan formulated and outlined with them.   I have encouraged them to ask questions as they arise throughout their visit. Disposition:  10:41 PM    The patient has been re-evaluated and is ready for discharge. Reviewed available results with patient. Counseled patient on diagnosis and care plan. Patient has expressed understanding, and all questions have been answered. Patient agrees with plan and agrees to follow up as recommended, or to return to the ED if their symptoms worsen. Discharge instructions have been provided and explained to the patient, along with reasons to return to the ED. PLAN:  1. Discharge Medication List as of 9/5/2019 10:37 PM      START taking these medications    Details   predniSONE (STERAPRED DS) 10 mg dose pack Follow instructions on taper pack. , Print, Disp-21 Tab, R-0      cyclobenzaprine (FLEXERIL) 10 mg tablet Take 1 Tab by mouth three (3) times daily as needed for Muscle Spasm(s). , Print, Disp-15 Tab, R-0         CONTINUE these medications which have NOT CHANGED    Details   cetirizine-pseudoePHEDrine (ZYRTEC-D) 5-120 mg Tb12 Take 1 Tab by mouth two (2) times a day., Print, Disp-30 Tab, R-0      albuterol (PROVENTIL HFA, VENTOLIN HFA, PROAIR HFA) 90 mcg/actuation inhaler Take 2 Puffs by inhalation every four (4) hours as needed for Wheezing., Print, Disp-1 Inhaler, R-0      azithromycin (ZITHROMAX Z-ELIN) 250 mg tablet 2 tabs by mouth day 1, then 1 tab by mouth daily on days 2-5, Print, Disp-6 Tab, R-0      ibuprofen (ADVIL;MOTRIN) 100 mg/5 mL suspension Take 20 mL by mouth every six (6) hours as needed. , Print, Disp-1 Bottle, R-0      naproxen (NAPROSYN) 500 mg tablet Take 1 Tab by mouth every twelve (12) hours as needed for Pain., Print, Disp-20 Tab, R-0      HYDROcodone-acetaminophen (NORCO) 5-325 mg per tablet Take 1 Tab by mouth every four (4) hours as needed for Pain. Max Daily Amount: 6 Tabs., Print, Disp-6 Tab, R-0           2.    Follow-up Information     Follow up With Specialties Details Why Contact Info [facility]  Call to schedule a follow up appointment [address] [facility] [address], [address] [place] [id number] [phone]    [facility] EMERGENCY DEPT Emergency Medicine Go to If symptoms worsen Devin Chava  [phone]        Return to ED if worse     Diagnosis     Clinical Impression:   1. Spondylosis of thoracic region without myelopathy or radiculopathy            Sanjuana Blake PA-C        This note will not be viewable in [facility].

## 2019-09-24 ENCOUNTER — OFFICE VISIT (OUTPATIENT)
Dept: INTERNAL MEDICINE CLINIC | Age: 34
End: 2019-09-24

## 2019-09-24 VITALS
HEART RATE: 73 BPM | SYSTOLIC BLOOD PRESSURE: 125 MMHG | HEIGHT: 65 IN | RESPIRATION RATE: 18 BRPM | BODY MASS INDEX: 48.82 KG/M2 | OXYGEN SATURATION: 98 % | WEIGHT: 293 LBS | DIASTOLIC BLOOD PRESSURE: 83 MMHG | TEMPERATURE: 98 F

## 2019-09-24 DIAGNOSIS — E66.01 OBESITY, MORBID (HCC): ICD-10-CM

## 2019-09-24 DIAGNOSIS — M62.838 MUSCLE SPASM: ICD-10-CM

## 2019-09-24 DIAGNOSIS — M47.814 SPONDYLOSIS OF THORACIC REGION WITHOUT MYELOPATHY OR RADICULOPATHY: Primary | ICD-10-CM

## 2019-09-24 DIAGNOSIS — Z76.89 ENCOUNTER TO ESTABLISH CARE: ICD-10-CM

## 2019-09-24 RX ORDER — ORPHENADRINE CITRATE 100 MG/1
100 TABLET, EXTENDED RELEASE ORAL
Qty: 20 TAB | Refills: 0 | Status: SHIPPED | OUTPATIENT
Start: 2019-09-24 | End: 2019-10-16 | Stop reason: SDUPTHER

## 2019-09-24 RX ORDER — MELOXICAM 15 MG/1
15 TABLET ORAL DAILY
Qty: 30 TAB | Refills: 3 | Status: SHIPPED | OUTPATIENT
Start: 2019-09-24 | End: 2020-01-27 | Stop reason: ALTCHOICE

## 2019-09-24 RX ORDER — CYCLOBENZAPRINE HCL 10 MG
10 TABLET ORAL
Qty: 15 TAB | Refills: 0 | Status: CANCELLED | OUTPATIENT
Start: 2019-09-24

## 2019-09-24 NOTE — PATIENT INSTRUCTIONS
Eat Breakfast DAILY within 30-60 minutes of WAKING and AIM to eat at least 5 TIMES DAILY, 3 meals and 2 snacks, about 2-3 hours apart. Aim to drink 165 OUNCES of water DAILY. Equal to HALF of your bodyweight. May add FRUIT, cucumbers, lemon, etc. For flavoring. Avoid SODA and JUICE. Start walking 3 TIMES WEEKLY for 20 minutes, as you get more comfortable increase your PACE and then the amount of TIME. The goal is 5 days weekly for 30 minutes. Also look up THE FAST METABOLISM DIET on ESTEFANY/AMAZON/Spool by Jaciel Ireland to review. There is also an VIOLET. Spondylolysis and Spondylolisthesis: Exercises Introduction Here are some examples of exercises for you to try. The exercises may be suggested for a condition or for rehabilitation. Start each exercise slowly. Ease off the exercises if you start to have pain. You will be told when to start these exercises and which ones will work best for you. How to do the exercises Single knee-to-chest 
 
1. Lie on your back with your knees bent and your feet flat on the floor. You can put a small pillow under your head and neck if it is more comfortable. 2. Bring one knee to your chest, keeping the other foot flat on the floor. 3. Keep your lower back pressed to the floor. Hold for 15 to 30 seconds. 4. Relax, and lower the knee to the starting position. 5. Repeat with the other leg. Repeat 2 to 4 times with each leg. 6. To get more stretch, put your other leg flat on the floor while pulling your knee to your chest. 
 
Double knee-to-chest 
 
1. Lie on your back with your knees bent and your feet flat on the floor. You can put a small pillow under your head and neck if it is more comfortable. 2. Bring both knees to your chest. 
3. Keep your lower back pressed to the floor. Hold for 15 to 30 seconds. 4. Relax, and lower your knees to the starting position. 5. Repeat 2 to 4 times. Alternate arm and leg (bird dog) exercise 1. Start on the floor, on your hands and knees. 2. Tighten your belly muscles by pulling your belly button in toward your spine. Be sure you continue to breathe normally and do not hold your breath. 3. Raise one arm off the floor, and hold it straight out in front of you. Be careful not to let your shoulder drop down, because that will twist your trunk. 4. Hold for about 6 seconds, then lower your arm and switch to your other arm. 5. Repeat 8 to 12 times on each arm. 6. When you can do this exercise with ease and no pain, repeat steps 1 through 5. But this time do it with one leg raised off the floor, holding your leg straight out behind you. Be careful not to let your hip drop down, because that will twist your trunk. 7. When holding your leg straight out becomes easier, try raising your opposite arm at the same time, and repeat steps 1 through 5. Bridging 1. Lie on your back with both knees bent. Your knees should be bent about 90 degrees. 2. Then push your feet into the floor, squeeze your buttocks, and lift your hips off the floor until your shoulders, hips, and knees are all in a straight line. 3. Hold for about 6 seconds as you continue to breathe normally, and then slowly lower your hips back down to the floor and rest for up to 10 seconds. 4. Repeat 8 to 12 times. Curl-ups 1. Lie on the floor on your back with your knees bent at a 90-degree angle. Your feet should be flat on the floor, about 12 inches from your buttocks. 2. Cross your arms over your chest. If this bothers your neck, try putting your hands behind your neck (not your head), with your elbows spread apart. 3. Slowly tighten your belly muscles and raise your shoulder blades off the floor. 4. Keep your head in line with your body, and do not press your chin to your chest. 
5. Hold this position for 1 or 2 seconds, then slowly lower yourself back down to the floor. 6. Repeat 8 to 12 times. Cristal Restrepo 1. Lie on your stomach, resting your upper body on your forearms. 2. Tighten your belly muscles by pulling your belly button in toward your spine. 3. Keeping your knees on the floor, press down with your forearms to lift your upper body off the floor. 4. Hold for about 6 seconds, then lower your body to the floor. Rest for up to 10 seconds. 5. Repeat 8 to 12 times. 6. Over time, work up to holding for 15 to 30 seconds each time. 7. If this exercise is easy to do with your knees on the floor, try doing this exercise with your knees and legs straight, supported by your toes on the floor. Follow-up care is a key part of your treatment and safety. Be sure to make and go to all appointments, and call your doctor if you are having problems. It's also a good idea to know your test results and keep a list of the medicines you take. Where can you learn more? Go to http://candice-jessica.info/. Enter [phone] in the search box to learn more about \"Spondylolysis and Spondylolisthesis: Exercises. \" Current as of: June 26, 2019 Content Version: 12.2 © 1615-2363 [employer]. Care instructions adapted under license by [employer] (which disclaims liability or warranty for this information). If you have questions about a medical condition or this instruction, always ask your healthcare professional. [place] [address] any warranty or liability for your use of this information.

## 2019-09-24 NOTE — PROGRESS NOTES
Kadi Johnson is a 36 y.o. female and presents with New Patient (here to establish care) and Back Pain (spondylosis) Subjective: 
Pt here to establish care. Pt was seen in ER on 9/5 for back pain after bending over to  items at work. Diagnosed with thoracic spondylosis. Was given prednisone and flexeril. Instructed to f/u with PCP. Reports feeling SAME AS when in ER, Pain Scale: 6/10. Review of Systems Constitutional: negative for fevers, chills, anorexia and weight loss Respiratory:  negative for cough, hemoptysis, dyspnea, and wheezing CV:   negative for chest pain, palpitations, and lower extremity edema GI:   negative for nausea, vomiting, diarrhea, abdominal pain, and melena Genitourinary: negative for frequency, urgency, dysuria, retention, and hematuria Musculoskel: negative for muscle weakness,and joint pain/swelling History reviewed. No pertinent past medical history. History reviewed. No pertinent surgical history. Social History Socioeconomic History  Marital status: SINGLE Spouse name: Not on file  Number of children: Not on file  Years of education: Not on file  Highest education level: Not on file Tobacco Use  Smoking status: Never Smoker  Smokeless tobacco: Never Used Substance and Sexual Activity  Alcohol use: Yes Comment: occasionally  Drug use: No  
 Sexual activity: Not Currently History reviewed. No pertinent family history. Current Outpatient Medications Medication Sig Dispense Refill  meloxicam (MOBIC) 15 mg tablet Take 1 Tab by mouth daily. 30 Tab 3  
 orphenadrine citrate (NORFLEX) 100 mg sr tablet Take 1 Tab by mouth two (2) times daily as needed (muscle spasm). Indications: muscle spasm 20 Tab 0 Allergies Allergen Reactions  Lactose Unknown (comments)  Pcn [Penicillins] Itching Objective: 
Visit Vitals /83 (BP 1 Location: Left arm, BP Patient Position: Sitting) Pulse 73 Temp 98 °F (36.7 °C) (Oral) Resp 18 Ht 5' 5\" (1.651 m) Wt 313 lb (142 kg) LMP 09/24/2019 (Exact Date) SpO2 98% BMI 52.09 kg/m² Wt Readings from Last 3 Encounters:  
09/24/19 313 lb (142 kg) 09/05/19 311 lb (141.1 kg) 05/16/19 306 lb 8 oz (139 kg) Physical Exam:  
General appearance - alert, well appearing, and in no distress. Mental status - A/O x 4,normal mood and affect. Chest - Symmetric chest rise. No wheezing. No distress. Heart - Normal rate. Abdomen- Soft, round. Non-distended, NT. No pulsatile masses or hernias. Ext- Radial, DP pulses, 2+ bilaterally. No pedal edema, clubbing, or cyanosis. Skin-Warm and dry. No hyperpigmentation, ulcerations, or suspicious lesions. Neuro - Normal speech, no focal findings or movement disorder. Normal strength, gait, and muscle tone. Back- alignment midline. Thoracic spinal and right paraspinal tenderness. no CVAT. Assessment/Plan: 
Mobic and Norflex prescribed. Referred to PT. Discussed the patient's BMI with her. The BMI follow up plan is as follows:  
 
I have reviewed/discussed the above normal BMI (Body mass index is 52.09 kg/m².) with the patient. I have recommended the following interventions: encourage exercise, monitor weight, and dietary management education, guidance, and counseling, . Medication Side Effects and Warnings were discussed with patient: yes Patient Labs were reviewed: yes Patient Past Records were reviewed: yes See below for other orders Follow-up and Dispositions · Return in about 2 months (around 11/24/2019) for LBP. ICD-10-CM ICD-9-CM 1. Spondylosis of thoracic region without myelopathy or radiculopathy M47.814 721.2 meloxicam (MOBIC) 15 mg tablet  
   orphenadrine citrate (NORFLEX) 100 mg sr tablet REFERRAL TO PHYSICAL THERAPY 2. Obesity, morbid ([facility] [address] E66.01 278.01   
3. Muscle spasm M62.838 728.85 orphenadrine citrate (NORFLEX) 100 mg sr tablet Orders Placed This Encounter  [facility] [place] [facility] Priority:   Routine Referral Type:   PT/OT/ST Referral Reason:   Specialty Services Required Referral Location:   [facility]  
  Number of Visits Requested:   1  
 meloxicam (MOBIC) 15 mg tablet Sig: Take 1 Tab by mouth daily. Dispense:  30 Tab Refill:  3  
 orphenadrine citrate (NORFLEX) 100 mg sr tablet Sig: Take 1 Tab by mouth two (2) times daily as needed (muscle spasm). Indications: muscle spasm Dispense:  20 Tab Refill:  0 Jeanella Bench expressed understanding of plan. An After Visit Summary was offered/printed and given to the patient.

## 2019-10-15 DIAGNOSIS — M47.814 SPONDYLOSIS OF THORACIC REGION WITHOUT MYELOPATHY OR RADICULOPATHY: ICD-10-CM

## 2019-10-15 DIAGNOSIS — M62.838 MUSCLE SPASM: ICD-10-CM

## 2019-10-16 RX ORDER — ORPHENADRINE CITRATE 100 MG/1
100 TABLET, EXTENDED RELEASE ORAL
Qty: 20 TAB | Refills: 0 | OUTPATIENT
Start: 2019-10-16

## 2019-10-23 RX ORDER — CHLORZOXAZONE 500 MG/1
500 TABLET ORAL
Qty: 40 TAB | Refills: 2 | Status: SHIPPED | OUTPATIENT
Start: 2019-10-23 | End: 2020-01-27

## 2019-11-25 ENCOUNTER — OFFICE VISIT (OUTPATIENT)
Dept: INTERNAL MEDICINE CLINIC | Age: 34
End: 2019-11-25

## 2019-11-25 VITALS
WEIGHT: 293 LBS | OXYGEN SATURATION: 96 % | TEMPERATURE: 96.8 F | HEART RATE: 72 BPM | BODY MASS INDEX: 48.82 KG/M2 | SYSTOLIC BLOOD PRESSURE: 125 MMHG | RESPIRATION RATE: 17 BRPM | HEIGHT: 65 IN | DIASTOLIC BLOOD PRESSURE: 81 MMHG

## 2019-11-25 DIAGNOSIS — M47.814 SPONDYLOSIS OF THORACIC REGION WITHOUT MYELOPATHY OR RADICULOPATHY: Primary | ICD-10-CM

## 2019-11-25 DIAGNOSIS — M62.838 CERVICAL PARASPINAL MUSCLE SPASM: ICD-10-CM

## 2019-11-25 DIAGNOSIS — L85.3 XEROSIS OF SKIN: ICD-10-CM

## 2019-11-25 NOTE — PROGRESS NOTES
Pt is here for Chief Complaint Patient presents with  Back Pain Pt states pain level is a 6/10 back 1. Have you been to the ER, urgent care clinic since your last visit? Hospitalized since your last visit? No 
 
2. Have you seen or consulted any other health care providers outside of the [address] [place] [address] since your last visit? Include any pap smears or colon screening.  No

## 2019-11-25 NOTE — PROGRESS NOTES
Li Mora is a 36 y.o. female and presents with Back Pain Subjective: 
Pt here to f/u back pain. Taking Mobic and chlorzoxazone. Feels better, but having some neck pain now, mostly in shoulders with increased workdays. No acute complaints otherwise. Denies side effects from medication. Review of Systems Constitutional: negative for fevers, chills, anorexia and weight loss Respiratory:  negative for cough, hemoptysis, dyspnea, and wheezing CV:   negative for chest pain, palpitations, and lower extremity edema Genitourinary: negative for frequency, urgency, dysuria, retention, and hematuria Integumentary:+dry skin to hands and feet, unresolved with use of vaseline. Musculoskel: negative for muscle weakness and joint pain/swelling Neurological:  negative for headaches, dizziness, vertigo,and memory/gait problems Behavl/Psych: negative for feelings of anxiety, depression, suicide, and mood changes History reviewed. No pertinent past medical history. History reviewed. No pertinent surgical history. Social History Socioeconomic History  Marital status: SINGLE Spouse name: Not on file  Number of children: Not on file  Years of education: Not on file  Highest education level: Not on file Tobacco Use  Smoking status: Never Smoker  Smokeless tobacco: Never Used Substance and Sexual Activity  Alcohol use: Yes Comment: occasionally  Drug use: No  
 Sexual activity: Not Currently History reviewed. No pertinent family history. Current Outpatient Medications Medication Sig Dispense Refill  chlorzoxazone (PARAFON FORTE) 500 mg tablet Take 1 Tab by mouth four (4) times daily as needed for Muscle Spasm(s). Indications: muscle spasm 40 Tab 2  
 meloxicam (MOBIC) 15 mg tablet Take 1 Tab by mouth daily. 30 Tab 3 Allergies Allergen Reactions  Lactose Unknown (comments)  Pcn [Penicillins] Itching Objective: 
Visit Vitals /81 (BP 1 Location: Left arm, BP Patient Position: Sitting) Pulse 72 Temp 96.8 °F (36 °C) (Oral) Resp 17 Ht 5' 5\" (1.651 m) Wt 310 lb (140.6 kg) SpO2 96% BMI 51.59 kg/m² Wt Readings from Last 3 Encounters:  
11/25/19 310 lb (140.6 kg) 09/24/19 313 lb (142 kg) 09/05/19 311 lb (141.1 kg) Physical Exam:  
General appearance - alert, well appearing, and in no distress. Mental status - A/O x 4, normal mood and affect. Neck -Supple ,normal CSP. FROM, non-tender. No significant adenopathy/thyromegaly. No JVD. Chest - CTA. Symmetric chest rise. No wheezing, rales or rhonchi. Heart - Normal rate, regular rhythm. Normal S1, S2. No MGR or clicks. Abdomen - Soft,non-distended. Normoactive BS in all quadrants. NT, no mass or HSM. Ext- Radial, DP pulses, 2+ bilaterally. No pedal edema, clubbing, or cyanosis. Skin-Warm and dry. No hyperpigmentation, ulcerations, or suspicious lesions. Neuro - Normal speech, no focal findings or movement disorder. Normal strength, gait, and muscle tone. Assessment/Plan: 
Continue PT and taking meds. Use of back brace advised. Medication Side Effects and Warnings were discussed with patient: yes Patient Labs were reviewed: yes Patient Past Records were reviewed: yes See below for other orders Follow-up and Dispositions · Return in about 2 months (around 1/25/2020) for annual with labs, pain f/u. ICD-10-CM ICD-9-CM 1. Spondylosis of thoracic region without myelopathy or radiculopathy M47.814 721.2 REFERRAL TO PHYSICAL THERAPY 2. Xerosis of skin L85.3 706.8 REFERRAL TO DERMATOLOGY 3. Cervical paraspinal muscle spasm M62.838 728.85 REFERRAL TO PHYSICAL THERAPY Orders Placed This Encounter  REFERRAL TO DERMATOLOGY Referral Priority:   Routine Referral Type:   Consultation Referral Reason:   Specialty Services Required Referred to Provider:   Mario Saxena MD  
 REFERRAL TO PHYSICAL THERAPY Referral Priority:   Routine Referral Type:   PT/OT/ST Referral Reason:   Specialty Services Required Number of Visits Requested:   1 Kevin Roberson expressed understanding of plan. An After Visit Summary was offered/printed and given to the patient.

## 2019-11-25 NOTE — PATIENT INSTRUCTIONS
Dry Skin: Care Instructions Your Care Instructions Dry skin is a common problem, especially in areas where the air is very dry. Dry skin can also become a problem as you get older and lose natural oils that keep your skin moist. 
A tendency toward dry, itchy skin may run in families. Some problems with the body's defenses (immune system), allergies, or an infection with a fungus may also cause patches of dry skin. An over-the-counter cream may help your dry skin. If your skin problem does not get better with home treatment, your doctor may prescribe ointment. You may need antibiotics if you have a skin infection. Follow-up care is a key part of your treatment and safety. Be sure to make and go to all appointments, and call your doctor if you are having problems. It's also a good idea to know your test results and keep a list of the medicines you take. How can you care for yourself at home? Showers and baths · Keep showers and baths short, and use warm or lukewarm water. Don't use hot water. It takes off more of your skin's natural oils. · Use as little soap as you can. Choose a mild soap, such as Dove, Cetaphil, or Neutrogena. Or use a skin cleanser like Aquanil or Cetaphil. · If you are taking a bath, use soap only at the very end. Then rinse off all traces of soap with fresh water. Gently pat your skin dry with a towel. Skin creams and moisturizers · Apply moisturizer or skin cream right away (within 3 minutes) after a bath or shower. Use a moisturizer at other times too, as often as you need it. · Moisturizing creams are better than lotions. Try brands like CeraVe cream, Cetaphil cream, or Eucerin cream. 
Other tips · When washing clothes, use a small amount of detergent. Don't use fabric softeners or dryer sheets.  
· For small areas of itchy skin, try an over-the-counter 1% hydrocortisone cream. 
· If you have very dry hands, spread petroleum jelly (such as Vaseline) on your hands before bed. Wear thin cotton gloves while you sleep. If your feet are dry, spread Vaseline on them and wear socks while you sleep. When should you call for help? Call your doctor now or seek immediate medical care if: 
  · You have signs of infection, such as: 
? Pain, warmth, or swelling in the skin. ? Red streaks near a wound in the skin. ? Pus coming from a wound in your skin. ? A fever.  
 Watch closely for changes in your health, and be sure to contact your doctor if: 
  · You do not get better as expected. Where can you learn more? Go to http://candice-jessica.info/. Enter B339 in the search box to learn more about \"Dry Skin: Care Instructions. \" Current as of: April 1, 2019 Content Version: 12.2 © 1895-4381 [employer]. Care instructions adapted under license by [employer] (which disclaims liability or warranty for this information). If you have questions about a medical condition or this instruction, always ask your healthcare professional. [place] [address] any warranty or liability for your use of this information. Neck Spasm: Exercises Introduction Here are some examples of exercises for you to try. The exercises may be suggested for a condition or for rehabilitation. Start each exercise slowly. Ease off the exercises if you start to have pain. You will be told when to start these exercises and which ones will work best for you. How to do the exercises Levator scapula stretch 1. Sit in a firm chair, or stand up straight. 2. Gently tilt your head toward your left shoulder. 3. Turn your head to look down into your armpit, bending your head slightly forward. Let the weight of your head stretch your neck muscles. 4. Hold for 15 to 30 seconds. 5. Return to your starting position. 6. Follow the same instructions above, but tilt your head toward your right shoulder. 7. Repeat 2 to 4 times toward each shoulder. Upper trapezius stretch 1. Sit in a firm chair, or stand up straight. 2. This stretch works best if you keep your shoulder down as you lean away from it. To help you remember to do this, start by relaxing your shoulders and lightly holding on to your thighs or your chair. 3. Tilt your head toward your shoulder and hold for 15 to 30 seconds. Let the weight of your head stretch your muscles. 4. If you would like a little added stretch, place your arm behind your back. Use the arm opposite of the direction you are tilting your head. For example, if you are tilting your head to the left, place your right arm behind your back. 5. Repeat 2 to 4 times toward each shoulder. Neck rotation 1. Sit in a firm chair, or stand up straight. 2. Keeping your chin level, turn your head to the right, and hold for 15 to 30 seconds. 3. Turn your head to the left, and hold for 15 to 30 seconds. 4. Repeat 2 to 4 times to each side. Chin tuck 1. Lie on the floor with a rolled-up towel under your neck. Your head should be touching the floor. 2. Slowly bring your chin toward the front of your neck. 3. Hold for a count of 6, and then relax for up to 10 seconds. 4. Repeat 8 to 12 times. Forward neck flexion 1. Sit in a firm chair, or stand up straight. 2. Bend your head forward. 3. Hold for 15 to 30 seconds, then return to your starting position. 4. Repeat 2 to 4 times. Follow-up care is a key part of your treatment and safety. Be sure to make and go to all appointments, and call your doctor if you are having problems. It's also a good idea to know your test results and keep a list of the medicines you take. Where can you learn more? Go to http://candice-jessica.info/. Enter P962 in the search box to learn more about \"Neck Spasm: Exercises. \" Current as of: June 26, 2019 Content Version: 12.2 © 3452-6453 [employer].  Care instructions adapted under license by [address] (which disclaims liability or warranty for this information). If you have questions about a medical condition or this instruction, always ask your healthcare professional. [address] any warranty or liability for your use of this information.

## 2019-11-26 ENCOUNTER — TELEPHONE (OUTPATIENT)
Dept: INTERNAL MEDICINE CLINIC | Age: 34
End: 2019-11-26

## 2019-11-26 NOTE — TELEPHONE ENCOUNTER
Alana Dowling, did she get the SA referral to PT or were you planning to fax over the form? She already seeing them and just needed her time extended. Just asking since she has called to get more visits and was addressed yesterday during her visit.

## 2019-11-26 NOTE — TELEPHONE ENCOUNTER
Pt went to physical therapy today at [facility] and is now requesting  More time for this please.   Pt #[phone]

## 2019-11-26 NOTE — TELEPHONE ENCOUNTER
Yes, this is why we ordered another referral yesterday for the additional time. Did she give the new referral to them?

## 2019-11-26 NOTE — TELEPHONE ENCOUNTER
She did not give the new referral to them I am currently sending over their requisition form, I informed Ms. Gi Wilson of this already

## 2019-11-27 ENCOUNTER — HOSPITAL ENCOUNTER (EMERGENCY)
Age: 34
Discharge: HOME | End: 2019-11-27
Attending: EMERGENCY MEDICINE
Payer: MEDICAID

## 2019-11-27 VITALS
RESPIRATION RATE: 16 BRPM | TEMPERATURE: 98.7 F | WEIGHT: 293 LBS | HEIGHT: 65 IN | OXYGEN SATURATION: 97 % | SYSTOLIC BLOOD PRESSURE: 142 MMHG | BODY MASS INDEX: 48.82 KG/M2 | DIASTOLIC BLOOD PRESSURE: 87 MMHG | HEART RATE: 98 BPM

## 2019-11-27 DIAGNOSIS — M54.2 NECK PAIN: Primary | ICD-10-CM

## 2019-11-27 PROCEDURE — 99283 EMERGENCY DEPT VISIT LOW MDM: CPT

## 2019-11-27 PROCEDURE — 74011250637 HC RX REV CODE- 250/637: Performed by: PHYSICIAN ASSISTANT

## 2019-11-27 RX ORDER — LIDOCAINE 50 MG/G
PATCH TOPICAL
Qty: 1 EACH | Refills: 0 | Status: SHIPPED | OUTPATIENT
Start: 2019-11-27 | End: 2020-01-27 | Stop reason: SDUPTHER

## 2019-11-27 RX ORDER — METHOCARBAMOL 500 MG/1
500 TABLET, FILM COATED ORAL
Status: COMPLETED | OUTPATIENT
Start: 2019-11-27 | End: 2019-11-27

## 2019-11-27 RX ORDER — HYDROCODONE BITARTRATE AND ACETAMINOPHEN 5; 325 MG/1; MG/1
1 TABLET ORAL
Status: COMPLETED | OUTPATIENT
Start: 2019-11-27 | End: 2019-11-27

## 2019-11-27 RX ORDER — LIDOCAINE 50 MG/G
PATCH TOPICAL
Qty: 1 EACH | Refills: 0 | Status: SHIPPED | OUTPATIENT
Start: 2019-11-27 | End: 2019-11-27

## 2019-11-27 RX ORDER — IBUPROFEN 600 MG/1
600 TABLET ORAL
Status: COMPLETED | OUTPATIENT
Start: 2019-11-27 | End: 2019-11-27

## 2019-11-27 RX ADMIN — HYDROCODONE BITARTRATE AND ACETAMINOPHEN 1 TABLET: 5; 325 TABLET ORAL at 18:00

## 2019-11-27 RX ADMIN — METHOCARBAMOL TABLETS 500 MG: 500 TABLET, COATED ORAL at 18:00

## 2019-11-27 RX ADMIN — IBUPROFEN 600 MG: 600 TABLET, FILM COATED ORAL at 18:00

## 2019-11-27 NOTE — ED NOTES
Pt c/o acute on chronic neck pain r/t spondylosis. Pt took Meloxicam this AM without relief. Emergency Department Nursing Plan of Care The Nursing Plan of Care is developed from the Nursing assessment and Emergency Department Attending provider initial evaluation. The plan of care may be reviewed in the ED Provider note. The Plan of Care was developed with the following considerations:  
Patient / Family readiness to learn indicated by:verbalized understanding Persons(s) to be included in education: patient Barriers to Learning/Limitations:No 
 
Signed Von Eisenmenger 11/27/2019   5:25 PM

## 2019-11-27 NOTE — ED PROVIDER NOTES
EMERGENCY DEPARTMENT HISTORY AND PHYSICAL EXAM 
 
 
Date: 11/27/2019 Patient Name: Gerard Ledezma History of Presenting Illness Chief Complaint Patient presents with  Neck Pain History Provided By: Patient HPI: Gerard Ledezma, 36 y.o. female with PMHx significant for spinal listhesis, chronic back pain. Patient presents to emergency department with complaints of exacerbation of her neck pain. She does state that she saw physical therapist and she takes meloxicam and muscle relaxant for her pain and it does not seem to help her exacerbation at this time. He denies any nausea, muscle weakness, chest pain, shortness of breath or any other acute complaints at this time. There are no other complaints, changes, or physical findings at this time. PCP: Andrei Leach NP No current facility-administered medications on file prior to encounter. Current Outpatient Medications on File Prior to Encounter Medication Sig Dispense Refill  chlorzoxazone (PARAFON FORTE) 500 mg tablet Take 1 Tab by mouth four (4) times daily as needed for Muscle Spasm(s). Indications: muscle spasm 40 Tab 2  
 meloxicam (MOBIC) 15 mg tablet Take 1 Tab by mouth daily. 30 Tab 3 Past History Past Medical History: 
History reviewed. No pertinent past medical history. Past Surgical History: 
History reviewed. No pertinent surgical history. Family History: 
History reviewed. No pertinent family history. Social History: 
Social History Tobacco Use  Smoking status: Never Smoker  Smokeless tobacco: Never Used Substance Use Topics  Alcohol use: Yes Comment: occasionally  Drug use: No  
 
 
Allergies: Allergies Allergen Reactions  Lactose Unknown (comments)  Pcn [Penicillins] Itching Review of Systems Review of Systems Musculoskeletal: Positive for neck pain. All other systems reviewed and are negative.  
 
 
Physical Exam  
Physical Exam 
 Vitals signs and nursing note reviewed. Constitutional:   
   Appearance: She is well-developed. HENT:  
   Head: Normocephalic and atraumatic. Eyes:  
   Conjunctiva/sclera: Conjunctivae normal.  
   Pupils: Pupils are equal, round, and reactive to light. Neck: Musculoskeletal: Normal range of motion and neck supple. Thyroid: No thyromegaly. Cardiovascular:  
   Rate and Rhythm: Normal rate and regular rhythm. Heart sounds: Normal heart sounds. No murmur. Pulmonary:  
   Effort: Pulmonary effort is normal. No respiratory distress. Breath sounds: Normal breath sounds. No stridor. No wheezing. Musculoskeletal: Normal range of motion. General: No tenderness. Comments: No midline cervical tenderness appreciated. Patient able to rotate neck 45 degrees left and right flexion extension preserved. Tenderness to palpation of the right trapezius muscle. No carotid bruits appreciated. Ulnar radial median motor and sensory intact with  strength 5 out of 5 equal bilaterally. Lymphadenopathy:  
   Cervical: No cervical adenopathy. Skin: 
   General: Skin is warm. Neurological:  
   Mental Status: She is alert and oriented to person, place, and time. Deep Tendon Reflexes: Reflexes are normal and symmetric. Psychiatric:     
   Judgment: Judgment normal.  
 
 
 
Diagnostic Study Results Labs - No results found for this or any previous visit (from the past 12 hour(s)). Radiologic Studies - No orders to display CT Results  (Last 48 hours) None CXR Results  (Last 48 hours) None Medical Decision Making I am the first provider for this patient. I reviewed the vital signs, available nursing notes, past medical history, past surgical history, family history and social history. Vital Signs-Reviewed the patient's vital signs. Patient Vitals for the past 12 hrs: 
 Temp Pulse Resp BP SpO2 11/27/19 1714 98.7 °F (37.1 °C) 98 16 142/87 97 % Records Reviewed: Nursing Notes Provider Notes (Medical Decision Making): At this time patient does have some tenderness which is suggestive of a trapezius muscle strain. At this time I will go ahead and advised patient is to continue her anti-inflammatories, muscle relaxants, will get back patch for her pain and actually will discharge patient in stable condition. Patient voices her understanding agreement to treatment plan will be discharged at this time. Low clinical suspicion for any fracture, at this time she does not have any signs concerning for brachial plexus lesion. ED Course:  
Initial assessment performed. The patients presenting problems have been discussed, and they are in agreement with the care plan formulated and outlined with them. I have encouraged them to ask questions as they arise throughout their visit. Critical Care Time: None Disposition: 
Disposition for home PLAN: 
1. Current Discharge Medication List  
  
START taking these medications Details  
lidocaine (LIDODERM) 5 % Apply patch to the affected area for 12 hours a day and remove for 12 hours a day. Qty: 1 Each, Refills: 0  
  
  
 
2. Follow-up Information Follow up With Specialties Details Why Contact Info Concepcion Salcido NP Nurse Practitioner   [id number] [place] [id number] [phone] Return to ED if worse Diagnosis Clinical Impression: 1. Neck pain Please note that this dictation was completed with [employer], the computer voice recognition software. Quite often unanticipated grammatical, syntax, homophones, and other interpretive errors are inadvertently transcribed by the computer software. Please disregards these errors. Please excuse any errors that have escaped final proofreading. This note will not be viewable in [address]

## 2019-11-27 NOTE — ED TRIAGE NOTES
CC neck and back pain. Has been receiving physical therapy for spondylosis, but pain has worsened over the past 4 days. Reports she has been taking a muscle relaxant with no relief.

## 2020-01-01 ENCOUNTER — OFFICE VISIT (OUTPATIENT)
Dept: INTERNAL MEDICINE CLINIC | Age: 35
End: 2020-01-01
Payer: MEDICAID

## 2020-01-01 VITALS
BODY MASS INDEX: 48.82 KG/M2 | HEIGHT: 65 IN | TEMPERATURE: 97.6 F | WEIGHT: 293 LBS | OXYGEN SATURATION: 97 % | DIASTOLIC BLOOD PRESSURE: 75 MMHG | RESPIRATION RATE: 17 BRPM | HEART RATE: 73 BPM | SYSTOLIC BLOOD PRESSURE: 136 MMHG

## 2020-01-01 VITALS
RESPIRATION RATE: 17 BRPM | DIASTOLIC BLOOD PRESSURE: 106 MMHG | WEIGHT: 293 LBS | TEMPERATURE: 97.2 F | SYSTOLIC BLOOD PRESSURE: 138 MMHG | HEART RATE: 75 BPM | OXYGEN SATURATION: 99 % | BODY MASS INDEX: 48.82 KG/M2 | HEIGHT: 65 IN

## 2020-01-01 DIAGNOSIS — F43.21 GRIEF REACTION: ICD-10-CM

## 2020-01-01 DIAGNOSIS — J45.21 MILD INTERMITTENT ASTHMA WITH ACUTE EXACERBATION: ICD-10-CM

## 2020-01-01 DIAGNOSIS — Z13.220 SCREENING FOR LIPID DISORDERS: ICD-10-CM

## 2020-01-01 DIAGNOSIS — M47.814 SPONDYLOSIS OF THORACIC REGION WITHOUT MYELOPATHY OR RADICULOPATHY: ICD-10-CM

## 2020-01-01 DIAGNOSIS — E66.01 OBESITY, MORBID (HCC): ICD-10-CM

## 2020-01-01 DIAGNOSIS — R03.0 ELEVATED BP WITHOUT DIAGNOSIS OF HYPERTENSION: ICD-10-CM

## 2020-01-01 DIAGNOSIS — J45.20 MILD INTERMITTENT ASTHMA WITHOUT COMPLICATION: Primary | ICD-10-CM

## 2020-01-01 DIAGNOSIS — M62.838 MUSCLE SPASM: ICD-10-CM

## 2020-01-01 DIAGNOSIS — Z13.0 SCREENING FOR ENDOCRINE, NUTRITIONAL, METABOLIC AND IMMUNITY DISORDER: ICD-10-CM

## 2020-01-01 DIAGNOSIS — Z13.228 SCREENING FOR ENDOCRINE, NUTRITIONAL, METABOLIC AND IMMUNITY DISORDER: ICD-10-CM

## 2020-01-01 DIAGNOSIS — Z00.00 ADULT GENERAL MEDICAL EXAMINATION: Primary | ICD-10-CM

## 2020-01-01 DIAGNOSIS — Z13.21 SCREENING FOR ENDOCRINE, NUTRITIONAL, METABOLIC AND IMMUNITY DISORDER: ICD-10-CM

## 2020-01-01 DIAGNOSIS — Z13.29 SCREENING FOR ENDOCRINE, NUTRITIONAL, METABOLIC AND IMMUNITY DISORDER: ICD-10-CM

## 2020-01-01 PROCEDURE — 99214 OFFICE O/P EST MOD 30 MIN: CPT | Performed by: NURSE PRACTITIONER

## 2020-01-01 PROCEDURE — 99395 PREV VISIT EST AGE 18-39: CPT | Performed by: NURSE PRACTITIONER

## 2020-01-01 RX ORDER — ALBUTEROL SULFATE 0.83 MG/ML
2.5 SOLUTION RESPIRATORY (INHALATION)
Qty: 30 NEBULE | Refills: 1 | Status: SHIPPED | OUTPATIENT
Start: 2020-01-01 | End: 2021-01-01 | Stop reason: SDUPTHER

## 2020-01-01 RX ORDER — IBUPROFEN 800 MG/1
TABLET ORAL
Status: ON HOLD | COMMUNITY
Start: 2020-08-17 | End: 2021-01-01

## 2020-01-01 RX ORDER — MONTELUKAST SODIUM 10 MG/1
10 TABLET ORAL DAILY
Qty: 30 TAB | Refills: 5 | Status: SHIPPED | OUTPATIENT
Start: 2020-01-01 | End: 2021-01-01 | Stop reason: SDUPTHER

## 2020-01-27 ENCOUNTER — OFFICE VISIT (OUTPATIENT)
Dept: INTERNAL MEDICINE CLINIC | Age: 35
End: 2020-01-27

## 2020-01-27 VITALS
OXYGEN SATURATION: 97 % | RESPIRATION RATE: 16 BRPM | HEART RATE: 64 BPM | HEIGHT: 65 IN | TEMPERATURE: 95.6 F | SYSTOLIC BLOOD PRESSURE: 138 MMHG | DIASTOLIC BLOOD PRESSURE: 80 MMHG | BODY MASS INDEX: 48.82 KG/M2 | WEIGHT: 293 LBS

## 2020-01-27 DIAGNOSIS — M47.814 SPONDYLOSIS OF THORACIC REGION WITHOUT MYELOPATHY OR RADICULOPATHY: Primary | ICD-10-CM

## 2020-01-27 DIAGNOSIS — M62.838 MUSCLE SPASM: ICD-10-CM

## 2020-01-27 DIAGNOSIS — M62.838 CERVICAL PARASPINAL MUSCLE SPASM: ICD-10-CM

## 2020-01-27 RX ORDER — NAPROXEN 500 MG/1
500 TABLET ORAL
Qty: 20 TAB | Refills: 0 | Status: SHIPPED | OUTPATIENT
Start: 2020-01-27 | End: 2020-02-06

## 2020-01-27 RX ORDER — LIDOCAINE 50 MG/G
PATCH TOPICAL
Qty: 30 EACH | Refills: 11 | Status: SHIPPED | OUTPATIENT
Start: 2020-01-27 | End: 2021-01-01 | Stop reason: SDUPTHER

## 2020-01-27 NOTE — PROGRESS NOTES
Génesis Dsouza is a 36 y.o. female and presents with Follow-up and Pain (Chronic) Subjective: 
Pt here to f/u back & shoulder pain. Stopped taking Mobic and chlorzoxazone, since feels they were NOT helpful. Feels better with use of BACK BRACE at work. Pain intermittent now. Using Aleve since Tylenol NOT helpful and lidocaine patches to right neck/shoulder. Feels pain more midline lately, x past 1 week. no fall or injury reported. Denies numbness and tingling. Denies side effects from medication. Review of Systems Constitutional: negative for fevers, chills, anorexia and weight loss Respiratory:  negative for cough, hemoptysis, dyspnea, and wheezing CV:   negative for chest pain, palpitations, and lower extremity edema Genitourinary: negative for frequency, urgency, dysuria, retention, and hematuria Integumentary:+dry skin to hands and feet, unresolved with use of vaseline. Musculoskel: negative for muscle weakness and joint pain/swelling Neurological:  negative for headaches, dizziness, vertigo,and memory/gait problems Behavl/Psych: negative for feelings of anxiety, depression, suicide, and mood changes No past medical history on file. No past surgical history on file. Social History Socioeconomic History  Marital status: SINGLE Spouse name: Not on file  Number of children: Not on file  Years of education: Not on file  Highest education level: Not on file Tobacco Use  Smoking status: Never Smoker  Smokeless tobacco: Never Used Substance and Sexual Activity  Alcohol use: Yes Comment: occasionally  Drug use: No  
 Sexual activity: Not Currently No family history on file. Current Outpatient Medications Medication Sig Dispense Refill  naproxen (NAPROSYN) 500 mg tablet Take 1 Tab by mouth two (2) times daily as needed for Pain for up to 10 days.  With food 20 Tab 0  
 lidocaine (LIDODERM) 5 % Apply patch to the affected area for 12 hours a day and remove for 12 hours a day. 30 Each 11 Allergies Allergen Reactions  Lactose Unknown (comments)  Pcn [Penicillins] Itching Objective: 
Visit Vitals /80 (BP 1 Location: Left arm, BP Patient Position: Sitting) Pulse 64 Temp 95.6 °F (35.3 °C) (Oral) Resp 16 Ht 5' 5\" (1.651 m) Wt 308 lb 14.4 oz (140.1 kg) LMP 01/25/2020 (Exact Date) SpO2 97% BMI 51.40 kg/m² Wt Readings from Last 3 Encounters:  
01/27/20 308 lb 14.4 oz (140.1 kg) 11/27/19 314 lb (142.4 kg) 11/25/19 310 lb (140.6 kg) Physical Exam:  
General appearance - alert, well appearing, and in no distress. Mental status - A/O x 4, normal mood and affect. Neck -Supple ,normal CSP. FROM, non-tender. No significant adenopathy/thyromegaly. No JVD. Chest - CTA. Symmetric chest rise. No wheezing, rales or rhonchi. Heart - Normal rate, regular rhythm. Normal S1, S2. No MGR or clicks. Abdomen - Soft,non-distended. Normoactive BS in all quadrants. NT, no mass or HSM. Ext- Radial, DP pulses, 2+ bilaterally. No pedal edema, clubbing, or cyanosis. Skin-Warm and dry. No hyperpigmentation, ulcerations, or suspicious lesions. Neuro - Normal speech, no focal findings or movement disorder. Normal strength, gait, and muscle tone. Back- alignment midline. No spinal or paraspinal tenderness. No CVAT. Assessment/Plan: 
The current medical regimen is effective;  continue present plan and medications. Medication Side Effects and Warnings were discussed with patient: yes Patient Labs were reviewed: yes Patient Past Records were reviewed: yes See below for other orders Follow-up and Dispositions · Return in about 6 months (around 7/27/2020) for annual with labs, pain f/u. ICD-10-CM ICD-9-CM 1. Spondylosis of thoracic region without myelopathy or radiculopathy M47.814 721.2 naproxen (NAPROSYN) 500 mg tablet  
   lidocaine (LIDODERM) 5 % 2. Cervical paraspinal muscle spasm M62.838 728.85 naproxen (NAPROSYN) 500 mg tablet  
   lidocaine (LIDODERM) 5 % 3. Muscle spasm M62.838 728.85 Orders Placed This Encounter  naproxen (NAPROSYN) 500 mg tablet Sig: Take 1 Tab by mouth two (2) times daily as needed for Pain for up to 10 days. With food Dispense:  20 Tab Refill:  0  
 lidocaine (LIDODERM) 5 % Sig: Apply patch to the affected area for 12 hours a day and remove for 12 hours a day. Dispense:  30 Each Refill:  [place] expressed understanding of plan. An After Visit Summary was offered/printed and given to the patient.

## 2020-01-27 NOTE — PROGRESS NOTES
Chief Complaint Patient presents with  Follow-up  Pain (Chronic) 1. Have you been to the ER, urgent care clinic since your last visit? Hospitalized since your last visit? No 
 
2. Have you seen or consulted any other health care providers outside of the [address] [place] [address] since your last visit? Include any pap smears or colon screening.  No

## 2020-01-27 NOTE — PATIENT INSTRUCTIONS
Back Stretches: Exercises Introduction Here are some examples of exercises for stretching your back. Start each exercise slowly. Ease off the exercise if you start to have pain. Your doctor or physical therapist will tell you when you can start these exercises and which ones will work best for you. How to do the exercises Overhead stretch 1. Stand comfortably with your feet shoulder-width apart. 2. Looking straight ahead, raise both arms over your head and reach toward the ceiling. Do not allow your head to tilt back. 3. Hold for 15 to 30 seconds, then lower your arms to your sides. 4. Repeat 2 to 4 times. Side stretch 1. Stand comfortably with your feet shoulder-width apart. 2. Raise one arm over your head, and then lean to the other side. 3. Slide your hand down your leg as you let the weight of your arm gently stretch your side muscles. Hold for 15 to 30 seconds. 4. Repeat 2 to 4 times on each side. Press-up 1. Lie on your stomach, supporting your body with your forearms. 2. Press your elbows down into the floor to raise your upper back. As you do this, relax your stomach muscles and allow your back to arch without using your back muscles. As your press up, do not let your hips or pelvis come off the floor. 3. Hold for 15 to 30 seconds, then relax. 4. Repeat 2 to 4 times. Relax and rest 
 
1. Lie on your back with a rolled towel under your neck and a pillow under your knees. Extend your arms comfortably to your sides. 2. Relax and breathe normally. 3. Remain in this position for about 10 minutes. 4. If you can, do this 2 or 3 times each day. Follow-up care is a key part of your treatment and safety. Be sure to make and go to all appointments, and call your doctor if you are having problems. It's also a good idea to know your test results and keep a list of the medicines you take. Where can you learn more? Go to http://canidce-jessica.info/. Enter U338 in the search box to learn more about \"Back Stretches: Exercises. \" Current as of: June 26, 2019 Content Version: 12.2 © 7968-4213 [employer]. Care instructions adapted under license by [employer] (which disclaims liability or warranty for this information). If you have questions about a medical condition or this instruction, always ask your healthcare professional. [place] [address] any warranty or liability for your use of this information.

## 2020-07-28 RX ORDER — ALBUTEROL SULFATE 0.83 MG/ML
2.5 SOLUTION RESPIRATORY (INHALATION)
Qty: 30 NEBULE | Refills: 1 | Status: SHIPPED | OUTPATIENT
Start: 2020-07-28 | End: 2020-01-01 | Stop reason: SDUPTHER

## 2020-09-10 NOTE — PATIENT INSTRUCTIONS
Aim for at least 5000 steps per day, but your goal should be 10,000 steps daily. Use an activity tracker to help monitor this. Well Visit, Ages 25 to 48: Care Instructions Your Care Instructions Physical exams can help you stay healthy. Your doctor has checked your overall health and may have suggested ways to take good care of yourself. He or she also may have recommended tests. At home, you can help prevent illness with healthy eating, regular exercise, and other steps. Follow-up care is a key part of your treatment and safety. Be sure to make and go to all appointments, and call your doctor if you are having problems. It's also a good idea to know your test results and keep a list of the medicines you take. How can you care for yourself at home? · Reach and stay at a healthy weight. This will lower your risk for many problems, such as obesity, diabetes, heart disease, and high blood pressure. · Get at least 30 minutes of physical activity on most days of the week. Walking is a good choice. You also may want to do other activities, such as running, swimming, cycling, or playing tennis or team sports. Discuss any changes in your exercise program with your doctor. · Do not smoke or allow others to smoke around you. If you need help quitting, talk to your doctor about stop-smoking programs and medicines. These can increase your chances of quitting for good. · Talk to your doctor about whether you have any risk factors for sexually transmitted infections (STIs). Having one sex partner (who does not have STIs and does not have sex with anyone else) is a good way to avoid these infections. · Use birth control if you do not want to have children at this time. Talk with your doctor about the choices available and what might be best for you. · Protect your skin from too much sun.  When you're outdoors from 10 a.m. to 4 p.m., stay in the shade or cover up with clothing and a hat with a wide brim. Wear sunglasses that block UV rays. Even when it's cloudy, put broad-spectrum sunscreen (SPF 30 or higher) on any exposed skin. · See a dentist one or two times a year for checkups and to have your teeth cleaned. · Wear a seat belt in the car. Follow your doctor's advice about when to have certain tests. These tests can spot problems early. For everyone · Cholesterol. Have the fat (cholesterol) in your blood tested after age 21. Your doctor will tell you how often to have this done based on your age, family history, or other things that can increase your risk for heart disease. · Blood pressure. Have your blood pressure checked during a routine doctor visit. Your doctor will tell you how often to check your blood pressure based on your age, your blood pressure results, and other factors. · Vision. Talk with your doctor about how often to have a glaucoma test. 
· Diabetes. Ask your doctor whether you should have tests for diabetes. · Colon cancer. Your risk for colorectal cancer gets higher as you get older. Some experts say that adults should start regular screening at age 48 and stop at age 76. Others say to start before age 48 or continue after age 76. Talk with your doctor about your risk and when to start and stop screening. For women · Breast exam and mammogram. Talk to your doctor about when you should have a clinical breast exam and a mammogram. Medical experts differ on whether and how often women under 50 should have these tests. Your doctor can help you decide what is right for you. · Cervical cancer screening test and pelvic exam. Begin with a Pap test at age 24. The test often is part of a pelvic exam. Starting at age 27, you may choose to have a Pap test, an HPV test, or both tests at the same time (called co-testing). Talk with your doctor about how often to have testing. · Tests for sexually transmitted infections (STIs).  Ask whether you should have tests for STIs. You may be at risk if you have sex with more than one person, especially if your partners do not wear condoms. For men · Tests for sexually transmitted infections (STIs). Ask whether you should have tests for STIs. You may be at risk if you have sex with more than one person, especially if you do not wear a condom. · Testicular cancer exam. Ask your doctor whether you should check your testicles regularly. · Prostate exam. Talk to your doctor about whether you should have a blood test (called a PSA test) for prostate cancer. Experts differ on whether and when men should have this test. Some experts suggest it if you are older than 39 and are -American or have a father or brother who got prostate cancer when he was younger than 72. When should you call for help? Watch closely for changes in your health, and be sure to contact your doctor if you have any problems or symptoms that concern you. Where can you learn more? Go to http://candice-jessica.info/ Enter P072 in the search box to learn more about \"Well Visit, Ages 25 to 48: Care Instructions. \" Current as of: May 27, 2020               Content Version: 12.6 © 3980-9854 [employer]. Care instructions adapted under license by [employer] (which disclaims liability or warranty for this information). If you have questions about a medical condition or this instruction, always ask your healthcare professional. [place] [address] any warranty or liability for your use of this information. Lung Function Tests: About These Tests What are these tests? Lung function tests measure how much air your lungs hold and how quickly your lungs can move the air in and out. Spirometry is often the first lung function test that is done.  You may also have other tests, such as gas diffusion tests, body plethysmography, inhalation challenge tests, and exercise stress tests. Your doctor will explain which tests you need. These tests check how well your lungs work. They may also be called pulmonary function tests, or PFTs. Why are these tests done? Doctors use lung function tests to find the cause of breathing problems and diagnose lung diseases like asthma or emphysema. You may have lung function tests before you have surgery. Or your doctor may use lung function tests to find out how well treatment for a lung problem is working. How do you prepare for these tests? · Let your doctor know if you take medicines for a lung problem. You may need to stop some of them before the tests. · Do not eat a heavy meal just before this test. A full stomach may keep your lungs from fully expanding. · Don't smoke or do intense exercise for 6 hours before the test. 
· For the test, wear loose clothing that doesn't restrict your breathing in any way. · Avoid food or drinks with caffeine. Caffeine can cause your airways to relax and allow more air than usual to pass through. · If you have dentures, wear them during the test. They help you form a tight seal around the mouthpiece of the machine. How are these tests done? What happens during the test depends on the type of test you have. For most tests, you will wear a nose clip. This is to make sure that no air passes in or out of your nose during the test. You then breathe into a mouthpiece attached to a recording device. · For some tests, you breathe in and out as deeply and as fast as you can. · You may repeat some tests after you inhale a medicine that expands your airways. · You may breathe certain gases, such as 100% oxygen or a mixture of helium and air. · For body plethysmography, you sit inside a small whiting with windows. The whiting measures pressure changes that occur as you breathe. The therapist may urge you to breathe deeply during some of the tests to get the best results. You may have a blood test to check the oxygen and carbon dioxide levels in your blood. How long do these tests take? The testing may take from 5 to 30 minutes, depending on how many tests you have. What happens after these tests? · You will probably be able to go home right after the tests. · You can go back to your normal activities right away. Follow-up care is a key part of your treatment and safety. Be sure to make and go to all appointments, and call your doctor if you are having problems. It's also a good idea to know your test results and keep a list of the medicines you take. Where can you learn more? Go to http://candice-jessica.info/ Enter L621 in the search box to learn more about \"Lung Function Tests: About These Tests. \" Current as of: February 24, 2020               Content Version: 12.6 © 9455-4848 [employer]. Care instructions adapted under license by [employer] (which disclaims liability or warranty for this information). If you have questions about a medical condition or this instruction, always ask your healthcare professional. [address] any warranty or liability for your use of this information.

## 2020-09-10 NOTE — PROGRESS NOTES
Art Baumgarten is a 30 y.o. female presenting for annual checkup. Specific concerns today: feels she may have had an asthma attack in June due to awakening with throat swelling sensation. Treated with nebulizer at home with mild resolution, SOB continued, seen at [facility] and told she had asthma. preceeded by PND and cough for a few weeks prior, treated with claritin, moderate relief. On amoxicillin at time of visit for dental abscess, but otherwise told she had a sinus infection with odor of foul mucus reported. Tried albuterol inhaler without relief. Elevated BP Review: 
The patient has high blood pressure Diet and Lifestyle: generally does try to follow a  low sodium diet, eating more soup lately following tooth extraction in past few weeks. exercises sporadically Home BP Monitoring: is not measured at home. Pertinent ROS: NOT prescribed medications at this time. No TIA's, chest pain on exertion, dyspnea on exertion, or swelling of ankles. BP Readings from Last 3 Encounters:  
09/10/20 (!) 138/106  
01/27/20 138/80  
11/27/19 142/87 ROS: Feeling well. No chest pain on exertion. No abdominal pain, change in bowel habits, black or bloody stools. Last BM: today, Williams#4. Averages 14-21 BMs every 7 days. Averages drinking 3 bottles of water daily. No urinary tract or gynecologic/prostatic symptoms. No neurological complaints. Review of Systems Constitutional: negative for fevers, chills, anorexia and weight loss Eyes:   negative for visual disturbance, drainage, and irritation ENT:   +NC. negative for tinnitus,sore throat,ear pain,and hoarseness Respiratory:  + cough, wheezing, and SOB. negative for  hemoptysis CV:   negative for chest pain, palpitations, and lower extremity edema GI:   negative for nausea, vomiting, diarrhea, abdominal pain, and melena Endo:               negative for polyuria,polydipsia,polyphagia, and heat intolerance Genitourinary: negative for frequency, urgency, dysuria, retention, and hematuria Integument:  negative for rash, ulcerations, and pruritus Hematologic:  negative for easy bruising and bleeding Musculoskel: +LBP, had back brace. negative for muscle weakness and joint pain/swelling Neurological:  negative for headaches, dizziness, vertigo,and memory/gait problems Behavl/Psych: negative for feelings of anxiety, depression, suicide, and mood changes Dental exam in past 12 months: yes Eye exam in past 12-24 months: yes Diet: Eats 2 times daily. Trying to lose weight . Exercise: 0 times weekly, but very active at work for [employer]. Sleep: Averages 5-6 hours, no snoring, no h/o sleep apnea History reviewed. No pertinent past medical history. History reviewed. No pertinent surgical history. Social History Socioeconomic History  Marital status: SINGLE Spouse name: Not on file  Number of children: Not on file  Years of education: Not on file  Highest education level: Not on file Tobacco Use  Smoking status: Never Smoker  Smokeless tobacco: Never Used Substance and Sexual Activity  Alcohol use: Yes Comment: occasionally  Drug use: No  
 Sexual activity: Not Currently History reviewed. No pertinent family history. Current Outpatient Medications Medication Sig Dispense Refill  ibuprofen (MOTRIN) 800 mg tablet  albuterol (PROVENTIL VENTOLIN) 2.5 mg /3 mL (0.083 %) nebu Take 3 mL by inhalation every four (4) hours as needed for Wheezing, Shortness of Breath or Cough. 30 Nebule 1  
 montelukast (SINGULAIR) 10 mg tablet Take 1 Tab by mouth daily. 30 Tab 5  ProAir HFA 90 mcg/actuation inhaler  lidocaine (LIDODERM) 5 % Apply patch to the affected area for 12 hours a day and remove for 12 hours a day. 30 Each 11 Allergies Allergen Reactions  Lactose Unknown (comments)  Pcn [Penicillins] Itching Objective: 
Visit Vitals BP (!) 138/106 (BP 1 Location: Right arm, BP Patient Position: Sitting) Pulse 75 Temp 97.2 °F (36.2 °C) (Oral) Resp 17 Ht 5' 5\" (1.651 m) Wt 311 lb (141.1 kg) LMP 09/01/2020 (Approximate) SpO2 99% BMI 51.75 kg/m² Wt Readings from Last 3 Encounters:  
09/10/20 311 lb (141.1 kg) 01/27/20 308 lb 14.4 oz (140.1 kg) 11/27/19 314 lb (142.4 kg) Physical Exam:  
General appearance - alert, well appearing, and in no distress. Mental status - A/O x 4, normal mood and affect. Head/Eyes- AT/NC. CHARLIE, EOMI, corneas normal, no foreign bodies. Ears- TM intact bilaterally, no erythema or drainage. Nose- Septum midline, pink mucosa. Turbinates pink and boggy, R>L, no polyps or erythema. No sinus tenderness. Mouth/Throat - mucous membranes moist, pharynx normal without lesions. No tonsillar swelling or exudates. Neck -Supple ,normal CSP. FROM, non-tender. No adenopathy/thyromegaly. No JVD. Chest - CTA. Symmetric chest rise. No wheezing, rales or rhonchi. Heart - Normal rate, regular rhythm. Normal S1, S2. No MGR. Abdomen - Soft,non-distended. Normoactive BS in all quadrants. NT, no mass, rebound, or HSM Ext- Radial, DP pulses, 2+ bilaterally. No pedal edema, clubbing, or cyanosis. Skin- Normal for ethnicity, warm, and dry. No hyperpigmentation, ulcerations, or suspicious lesions Neuro - Normal speech, no focal findings. Normal strength and muscle tone. Coordination and gait normal.   
 
Assessment/Plan: 
Will monitor BP, but likely diet induced. Low salt diet reviewed. Singulair started. Refilled neb solution, advised pt NOT to share machines with sister, roxanna during COVID-19. Use of inhaler encouraged if needed. Discussed the patient's BMI with her. The BMI follow up plan is as follows: resume exercise. I have reviewed/discussed the above normal BMI (Body mass index is 51.75 kg/m².) with the patient.   I have recommended the following interventions: encourage exercise, monitor weight, and dietary management education, guidance, and counseling, . Medication Side Effects and Warnings were discussed with patient: yes Patient Labs were reviewed: yes Patient Past Records were reviewed: yes See orders below ICD-10-CM ICD-9-CM 1. Adult general medical examination  X46.89 D32.1 METABOLIC PANEL, COMPREHENSIVE  
   CBC WITH AUTOMATED DIFF  
   HEMOGLOBIN A1C WITH EAG  
   LIPID PANEL  
   TSH 3RD GENERATION 2. Screening for endocrine, nutritional, metabolic and immunity disorder  J14.02 H66.08 METABOLIC PANEL, COMPREHENSIVE  
 Z13.21  CBC WITH AUTOMATED DIFF  
 Z13.228  HEMOGLOBIN A1C WITH EAG  
 Z13.0  LIPID PANEL  
   TSH 3RD GENERATION 3. Screening for lipid disorders  Z13.220 V77.91 LIPID PANEL 4. Mild intermittent asthma with acute exacerbation  J45.21 493.92   
5. Spondylosis of thoracic region without myelopathy or radiculopathy  M47.814 721.2 6. Muscle spasm  M62.838 728.85   
7. Elevated BP without diagnosis of hypertension  R03.0 796.2 8. [facility], morbid ([facility]  E66.01 278.01 Orders Placed This Encounter  METABOLIC PANEL, COMPREHENSIVE  
 CBC WITH AUTOMATED DIFF  
 HEMOGLOBIN A1C WITH EAG  
 LIPID PANEL  
 TSH 3RD GENERATION  ibuprofen (MOTRIN) 800 mg tablet  albuterol (PROVENTIL VENTOLIN) 2.5 mg /3 mL (0.083 %) nebu Sig: Take 3 mL by inhalation every four (4) hours as needed for Wheezing, Shortness of Breath or Cough. Dispense:  30 Nebule Refill:  1  
 montelukast (SINGULAIR) 10 mg tablet Sig: Take 1 Tab by mouth daily. Dispense:  30 Tab Refill:  5 Follow-up and Dispositions · Return in about 4 weeks (around 10/8/2020) for OV- PAP, BP check, BMI, lab review, asthma. Gerard Ledezma expressed understanding of plan. An After Visit Summary was offered/printed and given to the patient.

## 2020-09-10 NOTE — PROGRESS NOTES
Pt is here for Chief Complaint Patient presents with  Annual Exam  
  with labs  Asthma  
  pt states that she had an asthma attack in june Pt denies pain at this time 1. Have you been to the ER, urgent care clinic since your last visit? Hospitalized since your last visit? No 
 
2. Have you seen or consulted any other health care providers outside of the [address] [place] [address] since your last visit? Include any pap smears or colon screening.  No

## 2020-10-29 NOTE — PATIENT INSTRUCTIONS
Grief (Actual/Anticipated): Care Instructions Your Care Instructions Grief is your emotional reaction to a major loss. The words \"sorrow\" and \"heartache\" often are used to describe feelings of grief. You feel grief when you lose a beloved person, pet, place, or thing. It is also natural to feel grief when you lose a valued way of life, such as a job, marriage, or good health. You may begin to grieve before a loss occurs. You may grieve for a loved one who is sick and dying. Children and adults often feel the pain of loss before a big move or divorce. This type of grief helps you get ready for a loss. Grief is different for each person. There is no \"normal\" or \"expected\" period of time for grieving. Some people adjust to their loss within a couple of months. Others may take 2 years or longer, especially if their lives were changed a lot or if the loss was sudden and shocking. Grieving can cause problems such as headaches, loss of appetite, and trouble with thinking or sleeping. You may withdraw from friends and family and behave in ways that are unusual for you. Grief may cause you to question your beliefs and views about life. Grief is natural and does not require medical treatment. But if you have trouble sleeping, it may help to take sleeping pills for a short time. It may help to talk with people who have been through or are going through similar losses. You may also want to talk to a counselor about your feelings. Talking about your loss, sharing your cares and concerns, and getting support from others are important parts of healthy grieving. Follow-up care is a key part of your treatment and safety. Be sure to make and go to all appointments, and call your doctor if you are having problems. It's also a good idea to know your test results and keep a list of the medicines you take. How can you care for yourself at home? · Get enough sleep. Your mind helps make sense of your life while you sleep. Missing sleep can lead to illness and make it harder for you to deal with your grief. · Eat healthy foods. Try to avoid eating only foods that give you comfort. Ask someone to join you for a meal if you do not like eating alone. Consider taking a multivitamin every day. · Get some exercise every day. Even a walk can help you deal with your grief. Other exercises, such as yoga, can also help you manage stress. · Comfort yourself. Take time to look at photos or use special items that make you feel better. · Stay involved in your life. Do not withdraw from the activities you enjoy. People you know at work, Buddhism, clubs, or other groups can help you get through your period of grief. · Think about joining a support group to help you deal with your grief. There are many support groups to help people recover from grief. When should you call for help? Call 911 anytime you think you may need emergency care. For example, call if: 
  · You feel you cannot stop from hurting yourself or someone else. Watch closely for changes in your health, and be sure to contact your doctor if: 
  · You think you may be depressed.  
  · You do not get better as expected. Where can you learn more? Go to http://www.gray.com/ Enter H249 in the search box to learn more about \"Grief (Actual/Anticipated): Care Instructions. \" Current as of: December 9, 2019               Content Version: 12.6 © 5752-4121 [employer]. Care instructions adapted under license by [employer] (which disclaims liability or warranty for this information). If you have questions about a medical condition or this instruction, always ask your healthcare professional. [address] any warranty or liability for your use of this information.

## 2020-10-29 NOTE — PROGRESS NOTES
Pt is here for Chief Complaint Patient presents with  Follow-up BP Check, BMI, lab review and asthma Pt states pain 8/10 neck and shoulders 1. Have you been to the ER, urgent care clinic since your last visit? Hospitalized since your last visit? No 
 
2. Have you seen or consulted any other health care providers outside of the [address] [place] [address] since your last visit? Include any pap smears or colon screening.  No

## 2020-10-29 NOTE — PROGRESS NOTES
Omayra De La Cruz is a 30 y.o. female and presents with Follow-up (BP Check, BMI, lab review and asthma ) Subjective: 
Elevated BP Review: 
The patient has high blood pressure, but feeling stressed with recent deaths surrounding her. 2 family members passed, one found dead and 1 killed themselves. 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 Diet and Lifestyle: generally does NOT try to follow a  low sodium diet, exercises sporadically Home BP Monitoring: is not measured at home. Pertinent ROS: NOT prescribed medications at this time. No TIA's, chest pain on exertion, dyspnea on exertion, or swelling of ankles. BP Readings from Last 3 Encounters:  
10/29/20 136/75  
09/10/20 (!) 138/106  
20 138/80 Asthma Review: 
The patient is being seen for asthma symptoms,  currently stable. Asthma symptoms occur: infrequently. Wheezing when present is described as mild and easily relieved with rescue bronchodilator. The patient reports use of a steroid inhaler. Frequency of use of quick-relief meds: rarely since starting singulair. Regimen compliance: The patient reports adherence to this regimen. singulair. Weight Loss Counseling: 
Pt here to discuss elevated BMI. Would like to lose weight. Eating 2 x daily, usually at [facility] or fast food. Drinking HOnest TEA, no more soda. Exercising 5-7 weekly x 15 minutes, going up and down steps. Following no specific diet, eating chips also. Change in Patient Weight by Encounter [phone]) 
 
(10/29/2020) Office Visit Last Recorded Weight: 145.2 kg (320 lb) Percent Change: +2.91%   [09/10/2020 to 10/29/2020 (49 Days)] 
 
(09/10/2020) Office Visit Last Recorded Weight: 141.1 kg (311 lb) Percent Change: +0.71%   [2020 to 09/10/2020 (227 Days)] 
 
(2020) Office Visit Last Recorded Weight: 140.1 kg (308 lb 14.4 oz) Review of Systems Constitutional: negative for fevers, chills, anorexia and weight loss Respiratory:  negative for cough, hemoptysis, dyspnea, and wheezing CV:   negative for chest pain, palpitations, and lower extremity edema Genitourinary: negative for frequency, urgency, dysuria, retention, and hematuria Integumentary:+dry skin to hands and feet, unresolved with use of vaseline. Musculoskel: negative for muscle weakness and joint pain/swelling Neurological:  negative for headaches, dizziness, vertigo,and memory/gait problems Behavl/Psych: negative for feelings of anxiety, depression, suicide, and mood changes History reviewed. No pertinent past medical history. History reviewed. No pertinent surgical history. Social History Socioeconomic History  Marital status: SINGLE Spouse name: Not on file  Number of children: Not on file  Years of education: Not on file  Highest education level: Not on file Tobacco Use  Smoking status: Never Smoker  Smokeless tobacco: Never Used Substance and Sexual Activity  Alcohol use: Yes Comment: occasionally  Drug use: No  
 Sexual activity: Not Currently History reviewed. No pertinent family history. Current Outpatient Medications Medication Sig Dispense Refill  ibuprofen (MOTRIN) 800 mg tablet  albuterol (PROVENTIL VENTOLIN) 2.5 mg /3 mL (0.083 %) nebu Take 3 mL by inhalation every four (4) hours as needed for Wheezing, Shortness of Breath or Cough. 30 Nebule 1  
 montelukast (SINGULAIR) 10 mg tablet Take 1 Tab by mouth daily. 30 Tab 5  ProAir HFA 90 mcg/actuation inhaler  lidocaine (LIDODERM) 5 % Apply patch to the affected area for 12 hours a day and remove for 12 hours a day. 30 Each 11 Allergies Allergen Reactions  Lactose Unknown (comments)  Pcn [Penicillins] Itching Objective: 
Visit Vitals /75 (BP 1 Location: Left arm, BP Patient Position: Sitting) Pulse 73 Temp 97.6 °F (36.4 °C) (Oral) Resp 17 Ht 5' 5\" (1.651 m) Wt 320 lb (145.2 kg) LMP 10/10/2020 (Approximate) SpO2 97% BMI 53.25 kg/m² Wt Readings from Last 3 Encounters:  
10/29/20 320 lb (145.2 kg) 09/10/20 311 lb (141.1 kg) 01/27/20 308 lb 14.4 oz (140.1 kg) Physical Exam:  
General appearance - alert, well appearing, and in no distress. Mental status - A/O x 4, normal mood and affect. Neck -Supple ,normal CSP. FROM, non-tender. No significant adenopathy/thyromegaly. No JVD. Chest - CTA. Symmetric chest rise. No wheezing, rales or rhonchi. Heart - Normal rate, regular rhythm. Normal S1, S2. No MGR or clicks. Abdomen - Soft,non-distended. Normoactive BS in all quadrants. NT, no mass or HSM. Ext- Radial, DP pulses, 2+ bilaterally. No pedal edema, clubbing, or cyanosis. Skin-Warm and dry. No hyperpigmentation, ulcerations, or suspicious lesions. Neuro - Normal speech, no focal findings or movement disorder. Normal strength, gait, and muscle tone. Assessment/Plan: 
The current medical regimen is effective;  continue present plan and medications. Discussed the patient's BMI with her. The BMI follow up plan is as follows:  
 
I have reviewed/discussed the above normal BMI (Body mass index is 53.25 kg/m².) with the patient. I have recommended the following interventions: encourage exercise, monitor weight, and dietary management education, guidance, and counseling, . Medication Side Effects and Warnings were discussed with patient: yes Patient Labs were reviewed: yes Patient Past Records were reviewed: yes See below for other orders ICD-10-CM ICD-9-CM 1. Mild intermittent asthma without complication  H01.99 326.42   
2. Obesity, morbid ([facility]  E66.01 278.01   
3. Elevated BP without diagnosis of hypertension  R03.0 796.2 4. Grief reaction  F43.21 309.0 No orders of the defined types were placed in this encounter. Oliviapresley Krabbe expressed understanding of plan. An After Visit Summary was offered/printed and given to the patient.

## 2021-01-01 ENCOUNTER — APPOINTMENT (OUTPATIENT)
Dept: ULTRASOUND IMAGING | Age: 36
DRG: 720 | End: 2021-01-01
Attending: HOSPITALIST
Payer: COMMERCIAL

## 2021-01-01 ENCOUNTER — HOSPITAL ENCOUNTER (OUTPATIENT)
Dept: LAB | Age: 36
Discharge: HOME | End: 2021-01-29
Payer: COMMERCIAL

## 2021-01-01 ENCOUNTER — APPOINTMENT (OUTPATIENT)
Dept: GENERAL RADIOLOGY | Age: 36
DRG: 720 | End: 2021-01-01
Attending: INTERNAL MEDICINE
Payer: COMMERCIAL

## 2021-01-01 ENCOUNTER — APPOINTMENT (OUTPATIENT)
Dept: GENERAL RADIOLOGY | Age: 36
DRG: 720 | End: 2021-01-01
Attending: NURSE PRACTITIONER
Payer: COMMERCIAL

## 2021-01-01 ENCOUNTER — APPOINTMENT (OUTPATIENT)
Dept: GENERAL RADIOLOGY | Age: 36
DRG: 720 | End: 2021-01-01
Attending: EMERGENCY MEDICINE
Payer: COMMERCIAL

## 2021-01-01 ENCOUNTER — APPOINTMENT (OUTPATIENT)
Dept: GENERAL RADIOLOGY | Age: 36
DRG: 720 | End: 2021-01-01
Attending: HEALTH CARE PROVIDER
Payer: COMMERCIAL

## 2021-01-01 ENCOUNTER — OFFICE VISIT (OUTPATIENT)
Dept: INTERNAL MEDICINE CLINIC | Age: 36
End: 2021-01-01
Payer: COMMERCIAL

## 2021-01-01 ENCOUNTER — HOSPITAL ENCOUNTER (INPATIENT)
Age: 36
LOS: 1 days | Discharge: ANOTHER HOSPITAL | DRG: 720 | End: 2021-08-13
Attending: EMERGENCY MEDICINE | Admitting: INTERNAL MEDICINE
Payer: COMMERCIAL

## 2021-01-01 ENCOUNTER — APPOINTMENT (OUTPATIENT)
Dept: GENERAL RADIOLOGY | Age: 36
DRG: 720 | End: 2021-01-01
Attending: HOSPITALIST
Payer: COMMERCIAL

## 2021-01-01 ENCOUNTER — OFFICE VISIT (OUTPATIENT)
Dept: INTERNAL MEDICINE CLINIC | Age: 36
End: 2021-01-01
Payer: MEDICAID

## 2021-01-01 ENCOUNTER — HOSPITAL ENCOUNTER (INPATIENT)
Age: 36
LOS: 17 days | DRG: 720 | End: 2021-08-30
Attending: EMERGENCY MEDICINE | Admitting: HEALTH CARE PROVIDER
Payer: COMMERCIAL

## 2021-01-01 ENCOUNTER — APPOINTMENT (OUTPATIENT)
Dept: ULTRASOUND IMAGING | Age: 36
DRG: 720 | End: 2021-01-01
Attending: INTERNAL MEDICINE
Payer: COMMERCIAL

## 2021-01-01 VITALS
HEIGHT: 65 IN | WEIGHT: 293 LBS | SYSTOLIC BLOOD PRESSURE: 132 MMHG | DIASTOLIC BLOOD PRESSURE: 88 MMHG | RESPIRATION RATE: 17 BRPM | TEMPERATURE: 97.4 F | HEART RATE: 82 BPM | BODY MASS INDEX: 48.82 KG/M2 | OXYGEN SATURATION: 97 %

## 2021-01-01 VITALS
HEIGHT: 65 IN | BODY MASS INDEX: 48.82 KG/M2 | TEMPERATURE: 99 F | RESPIRATION RATE: 24 BRPM | SYSTOLIC BLOOD PRESSURE: 125 MMHG | DIASTOLIC BLOOD PRESSURE: 61 MMHG | HEART RATE: 98 BPM | OXYGEN SATURATION: 90 % | WEIGHT: 293 LBS

## 2021-01-01 VITALS
DIASTOLIC BLOOD PRESSURE: 21 MMHG | TEMPERATURE: 97.7 F | OXYGEN SATURATION: 79 % | BODY MASS INDEX: 48.82 KG/M2 | WEIGHT: 293 LBS | HEIGHT: 65 IN | SYSTOLIC BLOOD PRESSURE: 55 MMHG

## 2021-01-01 VITALS
OXYGEN SATURATION: 97 % | SYSTOLIC BLOOD PRESSURE: 136 MMHG | RESPIRATION RATE: 17 BRPM | BODY MASS INDEX: 48.82 KG/M2 | HEART RATE: 75 BPM | WEIGHT: 293 LBS | TEMPERATURE: 97.3 F | HEIGHT: 65 IN | DIASTOLIC BLOOD PRESSURE: 83 MMHG

## 2021-01-01 DIAGNOSIS — M62.838 CERVICAL PARASPINAL MUSCLE SPASM: ICD-10-CM

## 2021-01-01 DIAGNOSIS — E66.01 OBESITY, MORBID (HCC): ICD-10-CM

## 2021-01-01 DIAGNOSIS — R73.9 HIGH BLOOD SUGAR: ICD-10-CM

## 2021-01-01 DIAGNOSIS — K21.9 GERD WITHOUT ESOPHAGITIS: ICD-10-CM

## 2021-01-01 DIAGNOSIS — B96.89 BV (BACTERIAL VAGINOSIS): Primary | ICD-10-CM

## 2021-01-01 DIAGNOSIS — J45.20 MILD INTERMITTENT ASTHMA WITHOUT COMPLICATION: ICD-10-CM

## 2021-01-01 DIAGNOSIS — R09.02 HYPOXIA: ICD-10-CM

## 2021-01-01 DIAGNOSIS — J80 ARDS (ADULT RESPIRATORY DISTRESS SYNDROME) (HCC): ICD-10-CM

## 2021-01-01 DIAGNOSIS — J45.40 MODERATE PERSISTENT ASTHMA WITHOUT COMPLICATION: Primary | ICD-10-CM

## 2021-01-01 DIAGNOSIS — Z80.3 FAMILY HISTORY OF BREAST CANCER: ICD-10-CM

## 2021-01-01 DIAGNOSIS — Z12.4 SCREENING FOR MALIGNANT NEOPLASM OF CERVIX: ICD-10-CM

## 2021-01-01 DIAGNOSIS — Z13.220 SCREENING FOR LIPID DISORDERS: ICD-10-CM

## 2021-01-01 DIAGNOSIS — Z01.419 WELL WOMAN EXAM WITH ROUTINE GYNECOLOGICAL EXAM: Primary | ICD-10-CM

## 2021-01-01 DIAGNOSIS — E66.01 SEVERE OBESITY (BMI >= 40) (HCC): ICD-10-CM

## 2021-01-01 DIAGNOSIS — U07.1 COVID-19: Primary | ICD-10-CM

## 2021-01-01 DIAGNOSIS — E87.1 HYPONATREMIA: ICD-10-CM

## 2021-01-01 DIAGNOSIS — M47.814 SPONDYLOSIS OF THORACIC REGION WITHOUT MYELOPATHY OR RADICULOPATHY: ICD-10-CM

## 2021-01-01 DIAGNOSIS — J96.00 ACUTE RESPIRATORY FAILURE DUE TO COVID-19 (HCC): Primary | ICD-10-CM

## 2021-01-01 DIAGNOSIS — U07.1 ACUTE RESPIRATORY FAILURE DUE TO COVID-19 (HCC): Primary | ICD-10-CM

## 2021-01-01 DIAGNOSIS — Z13.0 SCREENING FOR ENDOCRINE, NUTRITIONAL, METABOLIC AND IMMUNITY DISORDER: ICD-10-CM

## 2021-01-01 DIAGNOSIS — N76.0 BV (BACTERIAL VAGINOSIS): Primary | ICD-10-CM

## 2021-01-01 DIAGNOSIS — Z13.21 SCREENING FOR ENDOCRINE, NUTRITIONAL, METABOLIC AND IMMUNITY DISORDER: ICD-10-CM

## 2021-01-01 DIAGNOSIS — R50.9 FEVER, UNSPECIFIED FEVER CAUSE: ICD-10-CM

## 2021-01-01 DIAGNOSIS — J18.9 PNEUMONIA OF BOTH LOWER LOBES DUE TO INFECTIOUS ORGANISM: ICD-10-CM

## 2021-01-01 DIAGNOSIS — Z13.228 SCREENING FOR ENDOCRINE, NUTRITIONAL, METABOLIC AND IMMUNITY DISORDER: ICD-10-CM

## 2021-01-01 DIAGNOSIS — Z13.29 SCREENING FOR ENDOCRINE, NUTRITIONAL, METABOLIC AND IMMUNITY DISORDER: ICD-10-CM

## 2021-01-01 DIAGNOSIS — R03.0 ELEVATED BP WITHOUT DIAGNOSIS OF HYPERTENSION: ICD-10-CM

## 2021-01-01 LAB
ALBUMIN SERPL-MCNC: 2.4 G/DL (ref 3.5–5)
ALBUMIN SERPL-MCNC: 2.5 G/DL (ref 3.5–5)
ALBUMIN SERPL-MCNC: 2.6 G/DL (ref 3.5–5)
ALBUMIN SERPL-MCNC: 2.7 G/DL (ref 3.5–5)
ALBUMIN SERPL-MCNC: 2.8 G/DL (ref 3.5–5)
ALBUMIN SERPL-MCNC: 2.9 G/DL (ref 3.5–5)
ALBUMIN SERPL-MCNC: 3 G/DL (ref 3.5–5)
ALBUMIN SERPL-MCNC: 3.1 G/DL (ref 3.5–5)
ALBUMIN SERPL-MCNC: 3.3 G/DL (ref 3.5–5)
ALBUMIN/GLOB SERPL: 0.5 {RATIO} (ref 1.1–2.2)
ALBUMIN/GLOB SERPL: 0.6 {RATIO} (ref 1.1–2.2)
ALBUMIN/GLOB SERPL: 0.6 {RATIO} (ref 1.1–2.2)
ALBUMIN/GLOB SERPL: 0.7 {RATIO} (ref 1.1–2.2)
ALBUMIN/GLOB SERPL: 0.8 {RATIO} (ref 1.1–2.2)
ALBUMIN/GLOB SERPL: 0.9 {RATIO} (ref 1.1–2.2)
ALP SERPL-CCNC: 40 U/L (ref 45–117)
ALP SERPL-CCNC: 40 U/L (ref 45–117)
ALP SERPL-CCNC: 44 U/L (ref 45–117)
ALP SERPL-CCNC: 46 U/L (ref 45–117)
ALP SERPL-CCNC: 49 U/L (ref 45–117)
ALP SERPL-CCNC: 49 U/L (ref 45–117)
ALP SERPL-CCNC: 54 U/L (ref 45–117)
ALP SERPL-CCNC: 56 U/L (ref 45–117)
ALP SERPL-CCNC: 56 U/L (ref 45–117)
ALP SERPL-CCNC: 60 U/L (ref 45–117)
ALP SERPL-CCNC: 62 U/L (ref 45–117)
ALP SERPL-CCNC: 63 U/L (ref 45–117)
ALP SERPL-CCNC: 64 U/L (ref 45–117)
ALP SERPL-CCNC: 68 U/L (ref 45–117)
ALP SERPL-CCNC: 68 U/L (ref 45–117)
ALP SERPL-CCNC: 69 U/L (ref 45–117)
ALP SERPL-CCNC: 71 U/L (ref 45–117)
ALP SERPL-CCNC: 72 U/L (ref 45–117)
ALP SERPL-CCNC: 81 U/L (ref 45–117)
ALT SERPL-CCNC: 36 U/L (ref 12–78)
ALT SERPL-CCNC: 40 U/L (ref 12–78)
ALT SERPL-CCNC: 40 U/L (ref 12–78)
ALT SERPL-CCNC: 42 U/L (ref 12–78)
ALT SERPL-CCNC: 44 U/L (ref 12–78)
ALT SERPL-CCNC: 44 U/L (ref 12–78)
ALT SERPL-CCNC: 46 U/L (ref 12–78)
ALT SERPL-CCNC: 47 U/L (ref 12–78)
ALT SERPL-CCNC: 48 U/L (ref 12–78)
ALT SERPL-CCNC: 49 U/L (ref 12–78)
ALT SERPL-CCNC: 52 U/L (ref 12–78)
ALT SERPL-CCNC: 54 U/L (ref 12–78)
ALT SERPL-CCNC: 58 U/L (ref 12–78)
ALT SERPL-CCNC: 69 U/L (ref 12–78)
ALT SERPL-CCNC: 70 U/L (ref 12–78)
ALT SERPL-CCNC: 71 U/L (ref 12–78)
ALT SERPL-CCNC: 78 U/L (ref 12–78)
ALT SERPL-CCNC: 85 U/L (ref 12–78)
ALT SERPL-CCNC: 87 U/L (ref 12–78)
ANION GAP SERPL CALC-SCNC: 1 MMOL/L (ref 5–15)
ANION GAP SERPL CALC-SCNC: 10 MMOL/L (ref 5–15)
ANION GAP SERPL CALC-SCNC: 10 MMOL/L (ref 5–15)
ANION GAP SERPL CALC-SCNC: 2 MMOL/L (ref 5–15)
ANION GAP SERPL CALC-SCNC: 3 MMOL/L (ref 5–15)
ANION GAP SERPL CALC-SCNC: 4 MMOL/L (ref 5–15)
ANION GAP SERPL CALC-SCNC: 4 MMOL/L (ref 5–15)
ANION GAP SERPL CALC-SCNC: 5 MMOL/L (ref 5–15)
ANION GAP SERPL CALC-SCNC: 6 MMOL/L (ref 5–15)
ANION GAP SERPL CALC-SCNC: 7 MMOL/L (ref 5–15)
APPEARANCE UR: CLEAR
ARTERIAL PATENCY WRIST A: ABNORMAL
ARTERIAL PATENCY WRIST A: POSITIVE
ARTERIAL PATENCY WRIST A: POSITIVE
ARTERIAL PATENCY WRIST A: YES
AST SERPL-CCNC: 106 U/L (ref 15–37)
AST SERPL-CCNC: 137 U/L (ref 15–37)
AST SERPL-CCNC: 140 U/L (ref 15–37)
AST SERPL-CCNC: 142 U/L (ref 15–37)
AST SERPL-CCNC: 183 U/L (ref 15–37)
AST SERPL-CCNC: 186 U/L (ref 15–37)
AST SERPL-CCNC: 32 U/L (ref 15–37)
AST SERPL-CCNC: 32 U/L (ref 15–37)
AST SERPL-CCNC: 37 U/L (ref 15–37)
AST SERPL-CCNC: 38 U/L (ref 15–37)
AST SERPL-CCNC: 40 U/L (ref 15–37)
AST SERPL-CCNC: 41 U/L (ref 15–37)
AST SERPL-CCNC: 48 U/L (ref 15–37)
AST SERPL-CCNC: 50 U/L (ref 15–37)
AST SERPL-CCNC: 56 U/L (ref 15–37)
AST SERPL-CCNC: 60 U/L (ref 15–37)
AST SERPL-CCNC: 79 U/L (ref 15–37)
AST SERPL-CCNC: 82 U/L (ref 15–37)
AST SERPL-CCNC: 89 U/L (ref 15–37)
ATRIAL RATE: 118 BPM
ATRIAL RATE: 95 BPM
ATRIAL RATE: 96 BPM
BACTERIA SPEC CULT: ABNORMAL
BACTERIA SPEC CULT: ABNORMAL
BACTERIA SPEC CULT: NORMAL
BACTERIA SPEC CULT: NORMAL
BACTERIA URNS QL MICRO: NEGATIVE /HPF
BASE DEFICIT BLD-SCNC: 1.1 MMOL/L
BASE DEFICIT BLD-SCNC: 1.8 MMOL/L
BASE DEFICIT BLDA-SCNC: 0.3 MMOL/L
BASE DEFICIT BLDA-SCNC: 1.6 MMOL/L
BASE DEFICIT BLDA-SCNC: 1.6 MMOL/L
BASE DEFICIT BLDA-SCNC: 2 MMOL/L
BASE DEFICIT BLDA-SCNC: 2.3 MMOL/L
BASE DEFICIT BLDA-SCNC: 3 MMOL/L
BASE DEFICIT BLDA-SCNC: 3.1 MMOL/L
BASE DEFICIT BLDA-SCNC: 6.9 MMOL/L
BASE DEFICIT BLDV-SCNC: 6.7 MMOL/L
BASE EXCESS BLDA CALC-SCNC: 1.5 MMOL/L
BASE EXCESS BLDA CALC-SCNC: 1.8 MMOL/L
BASE EXCESS BLDA CALC-SCNC: 1.9 MMOL/L
BASE EXCESS BLDA CALC-SCNC: 4.1 MMOL/L
BASE EXCESS BLDA CALC-SCNC: 4.7 MMOL/L
BASE EXCESS BLDA CALC-SCNC: 5.8 MMOL/L
BASE EXCESS BLDA CALC-SCNC: 5.8 MMOL/L
BASE EXCESS BLDA CALC-SCNC: 6.1 MMOL/L
BASE EXCESS BLDA CALC-SCNC: 8.3 MMOL/L
BASE EXCESS BLDA CALC-SCNC: 9 MMOL/L
BASE EXCESS BLDV CALC-SCNC: 0 MMOL/L
BASOPHILS # BLD: 0 K/UL (ref 0–0.1)
BASOPHILS NFR BLD: 0 % (ref 0–1)
BDY SITE: ABNORMAL
BILIRUB DIRECT SERPL-MCNC: 0.3 MG/DL (ref 0–0.2)
BILIRUB DIRECT SERPL-MCNC: 0.3 MG/DL (ref 0–0.2)
BILIRUB SERPL-MCNC: 0.6 MG/DL (ref 0.2–1)
BILIRUB SERPL-MCNC: 0.7 MG/DL (ref 0.2–1)
BILIRUB SERPL-MCNC: 0.8 MG/DL (ref 0.2–1)
BILIRUB SERPL-MCNC: 0.8 MG/DL (ref 0.2–1)
BILIRUB SERPL-MCNC: 0.9 MG/DL (ref 0.2–1)
BILIRUB SERPL-MCNC: 1 MG/DL (ref 0.2–1)
BILIRUB SERPL-MCNC: 1.1 MG/DL (ref 0.2–1)
BILIRUB SERPL-MCNC: 1.1 MG/DL (ref 0.2–1)
BILIRUB UR QL: NEGATIVE
BREATHS.SPONTANEOUS ON VENT: 40
BUN SERPL-MCNC: 11 MG/DL (ref 6–20)
BUN SERPL-MCNC: 12 MG/DL (ref 6–20)
BUN SERPL-MCNC: 16 MG/DL (ref 6–20)
BUN SERPL-MCNC: 20 MG/DL (ref 6–20)
BUN SERPL-MCNC: 23 MG/DL (ref 6–20)
BUN SERPL-MCNC: 26 MG/DL (ref 6–20)
BUN SERPL-MCNC: 28 MG/DL (ref 6–20)
BUN SERPL-MCNC: 29 MG/DL (ref 6–20)
BUN SERPL-MCNC: 29 MG/DL (ref 6–20)
BUN SERPL-MCNC: 30 MG/DL (ref 6–20)
BUN SERPL-MCNC: 30 MG/DL (ref 6–20)
BUN SERPL-MCNC: 31 MG/DL (ref 6–20)
BUN SERPL-MCNC: 31 MG/DL (ref 6–20)
BUN SERPL-MCNC: 34 MG/DL (ref 6–20)
BUN SERPL-MCNC: 35 MG/DL (ref 6–20)
BUN SERPL-MCNC: 36 MG/DL (ref 6–20)
BUN SERPL-MCNC: 36 MG/DL (ref 6–20)
BUN/CREAT SERPL: 15 (ref 12–20)
BUN/CREAT SERPL: 26 (ref 12–20)
BUN/CREAT SERPL: 29 (ref 12–20)
BUN/CREAT SERPL: 30 (ref 12–20)
BUN/CREAT SERPL: 32 (ref 12–20)
BUN/CREAT SERPL: 32 (ref 12–20)
BUN/CREAT SERPL: 33 (ref 12–20)
BUN/CREAT SERPL: 33 (ref 12–20)
BUN/CREAT SERPL: 34 (ref 12–20)
BUN/CREAT SERPL: 35 (ref 12–20)
BUN/CREAT SERPL: 36 (ref 12–20)
BUN/CREAT SERPL: 39 (ref 12–20)
BUN/CREAT SERPL: 9 (ref 12–20)
BUN/CREAT SERPL: 9 (ref 12–20)
CALCIUM SERPL-MCNC: 7.6 MG/DL (ref 8.5–10.1)
CALCIUM SERPL-MCNC: 7.7 MG/DL (ref 8.5–10.1)
CALCIUM SERPL-MCNC: 7.8 MG/DL (ref 8.5–10.1)
CALCIUM SERPL-MCNC: 8.1 MG/DL (ref 8.5–10.1)
CALCIUM SERPL-MCNC: 8.2 MG/DL (ref 8.5–10.1)
CALCIUM SERPL-MCNC: 8.3 MG/DL (ref 8.5–10.1)
CALCIUM SERPL-MCNC: 8.4 MG/DL (ref 8.5–10.1)
CALCIUM SERPL-MCNC: 8.4 MG/DL (ref 8.5–10.1)
CALCIUM SERPL-MCNC: 8.6 MG/DL (ref 8.5–10.1)
CALCIUM SERPL-MCNC: 8.6 MG/DL (ref 8.5–10.1)
CALCIUM SERPL-MCNC: 8.7 MG/DL (ref 8.5–10.1)
CALCIUM SERPL-MCNC: 8.8 MG/DL (ref 8.5–10.1)
CALCIUM SERPL-MCNC: 8.9 MG/DL (ref 8.5–10.1)
CALCIUM SERPL-MCNC: 9 MG/DL (ref 8.5–10.1)
CALCIUM SERPL-MCNC: 9 MG/DL (ref 8.5–10.1)
CALCULATED P AXIS, ECG09: 35 DEGREES
CALCULATED P AXIS, ECG09: 52 DEGREES
CALCULATED R AXIS, ECG10: 33 DEGREES
CALCULATED R AXIS, ECG10: 43 DEGREES
CALCULATED R AXIS, ECG10: 55 DEGREES
CALCULATED T AXIS, ECG11: 0 DEGREES
CALCULATED T AXIS, ECG11: 31 DEGREES
CALCULATED T AXIS, ECG11: 5 DEGREES
CHLORIDE SERPL-SCNC: 103 MMOL/L (ref 97–108)
CHLORIDE SERPL-SCNC: 104 MMOL/L (ref 97–108)
CHLORIDE SERPL-SCNC: 104 MMOL/L (ref 97–108)
CHLORIDE SERPL-SCNC: 105 MMOL/L (ref 97–108)
CHLORIDE SERPL-SCNC: 106 MMOL/L (ref 97–108)
CHLORIDE SERPL-SCNC: 106 MMOL/L (ref 97–108)
CHLORIDE SERPL-SCNC: 108 MMOL/L (ref 97–108)
CHLORIDE SERPL-SCNC: 110 MMOL/L (ref 97–108)
CHLORIDE SERPL-SCNC: 112 MMOL/L (ref 97–108)
CHLORIDE SERPL-SCNC: 113 MMOL/L (ref 97–108)
CHLORIDE SERPL-SCNC: 116 MMOL/L (ref 97–108)
CHLORIDE SERPL-SCNC: 116 MMOL/L (ref 97–108)
CHLORIDE SERPL-SCNC: 117 MMOL/L (ref 97–108)
CHLORIDE SERPL-SCNC: 94 MMOL/L (ref 97–108)
CK SERPL-CCNC: 1723 U/L (ref 26–192)
CK SERPL-CCNC: 1750 U/L (ref 26–192)
CK SERPL-CCNC: 1956 U/L (ref 26–192)
CK SERPL-CCNC: 2504 U/L (ref 26–192)
CK SERPL-CCNC: 2546 U/L (ref 26–192)
CK SERPL-CCNC: 3683 U/L (ref 26–192)
CK SERPL-CCNC: 416 U/L (ref 26–192)
CK SERPL-CCNC: 4173 U/L (ref 26–192)
CK SERPL-CCNC: 460 U/L (ref 26–192)
CK SERPL-CCNC: 537 U/L (ref 26–192)
CK SERPL-CCNC: 5830 U/L (ref 26–192)
CK SERPL-CCNC: 593 U/L (ref 26–192)
CK SERPL-CCNC: 6028 U/L (ref 26–192)
CK SERPL-CCNC: 8911 U/L (ref 26–192)
CK SERPL-CCNC: 980 U/L (ref 26–192)
CK SERPL-CCNC: 999 U/L (ref 26–192)
CK SERPL-CCNC: ABNORMAL U/L (ref 26–192)
CO2 SERPL-SCNC: 24 MMOL/L (ref 21–32)
CO2 SERPL-SCNC: 24 MMOL/L (ref 21–32)
CO2 SERPL-SCNC: 25 MMOL/L (ref 21–32)
CO2 SERPL-SCNC: 26 MMOL/L (ref 21–32)
CO2 SERPL-SCNC: 26 MMOL/L (ref 21–32)
CO2 SERPL-SCNC: 27 MMOL/L (ref 21–32)
CO2 SERPL-SCNC: 29 MMOL/L (ref 21–32)
CO2 SERPL-SCNC: 30 MMOL/L (ref 21–32)
CO2 SERPL-SCNC: 30 MMOL/L (ref 21–32)
CO2 SERPL-SCNC: 31 MMOL/L (ref 21–32)
CO2 SERPL-SCNC: 32 MMOL/L (ref 21–32)
CO2 SERPL-SCNC: 33 MMOL/L (ref 21–32)
CO2 SERPL-SCNC: 33 MMOL/L (ref 21–32)
COLOR UR: ABNORMAL
CREAT SERPL-MCNC: 0.76 MG/DL (ref 0.55–1.02)
CREAT SERPL-MCNC: 0.85 MG/DL (ref 0.55–1.02)
CREAT SERPL-MCNC: 0.86 MG/DL (ref 0.55–1.02)
CREAT SERPL-MCNC: 0.89 MG/DL (ref 0.55–1.02)
CREAT SERPL-MCNC: 0.9 MG/DL (ref 0.55–1.02)
CREAT SERPL-MCNC: 0.91 MG/DL (ref 0.55–1.02)
CREAT SERPL-MCNC: 0.92 MG/DL (ref 0.55–1.02)
CREAT SERPL-MCNC: 0.95 MG/DL (ref 0.55–1.02)
CREAT SERPL-MCNC: 0.98 MG/DL (ref 0.55–1.02)
CREAT SERPL-MCNC: 0.99 MG/DL (ref 0.55–1.02)
CREAT SERPL-MCNC: 0.99 MG/DL (ref 0.55–1.02)
CREAT SERPL-MCNC: 1.07 MG/DL (ref 0.55–1.02)
CREAT SERPL-MCNC: 1.09 MG/DL (ref 0.55–1.02)
CREAT SERPL-MCNC: 1.17 MG/DL (ref 0.55–1.02)
CREAT SERPL-MCNC: 1.26 MG/DL (ref 0.55–1.02)
CREAT SERPL-MCNC: 1.33 MG/DL (ref 0.55–1.02)
CREAT SERPL-MCNC: 1.33 MG/DL (ref 0.55–1.02)
CRP SERPL HS-MCNC: >9.5 MG/L
CRP SERPL-MCNC: 0.77 MG/DL (ref 0–0.6)
CRP SERPL-MCNC: 9.92 MG/DL (ref 0–0.6)
D DIMER PPP FEU-MCNC: 1.19 MG/L FEU (ref 0–0.65)
D DIMER PPP FEU-MCNC: 2.36 MG/L FEU (ref 0–0.65)
D DIMER PPP FEU-MCNC: 4.46 MG/L FEU (ref 0–0.65)
D DIMER PPP FEU-MCNC: 9.61 MG/L FEU (ref 0–0.65)
DATE LAST DOSE: ABNORMAL
DIAGNOSIS, 93000: NORMAL
DIFFERENTIAL METHOD BLD: ABNORMAL
DIFFERENTIAL METHOD BLD: NORMAL
EOSINOPHIL # BLD: 0 K/UL (ref 0–0.4)
EOSINOPHIL # BLD: 0.1 K/UL (ref 0–0.4)
EOSINOPHIL # BLD: 0.2 K/UL (ref 0–0.4)
EOSINOPHIL # BLD: 0.2 K/UL (ref 0–0.4)
EOSINOPHIL NFR BLD: 0 % (ref 0–7)
EOSINOPHIL NFR BLD: 1 % (ref 0–7)
EOSINOPHIL NFR BLD: 2 % (ref 0–7)
EOSINOPHIL NFR BLD: 2 % (ref 0–7)
EOSINOPHIL NFR BLD: 3 % (ref 0–7)
EPITH CASTS URNS QL MICRO: ABNORMAL /LPF
ERYTHROCYTE [DISTWIDTH] IN BLOOD BY AUTOMATED COUNT: 14 % (ref 11.5–14.5)
ERYTHROCYTE [DISTWIDTH] IN BLOOD BY AUTOMATED COUNT: 14.3 % (ref 11.5–14.5)
ERYTHROCYTE [DISTWIDTH] IN BLOOD BY AUTOMATED COUNT: 14.4 % (ref 11.5–14.5)
ERYTHROCYTE [DISTWIDTH] IN BLOOD BY AUTOMATED COUNT: 14.4 % (ref 11.5–14.5)
ERYTHROCYTE [DISTWIDTH] IN BLOOD BY AUTOMATED COUNT: 14.5 % (ref 11.5–14.5)
ERYTHROCYTE [DISTWIDTH] IN BLOOD BY AUTOMATED COUNT: 14.5 % (ref 11.5–14.5)
ERYTHROCYTE [DISTWIDTH] IN BLOOD BY AUTOMATED COUNT: 14.6 % (ref 11.5–14.5)
ERYTHROCYTE [DISTWIDTH] IN BLOOD BY AUTOMATED COUNT: 14.7 % (ref 11.5–14.5)
ERYTHROCYTE [DISTWIDTH] IN BLOOD BY AUTOMATED COUNT: 14.7 % (ref 11.5–14.5)
ERYTHROCYTE [DISTWIDTH] IN BLOOD BY AUTOMATED COUNT: 14.8 % (ref 11.5–14.5)
ERYTHROCYTE [DISTWIDTH] IN BLOOD BY AUTOMATED COUNT: 14.8 % (ref 11.5–14.5)
ERYTHROCYTE [DISTWIDTH] IN BLOOD BY AUTOMATED COUNT: 14.9 % (ref 11.5–14.5)
ERYTHROCYTE [DISTWIDTH] IN BLOOD BY AUTOMATED COUNT: 15 % (ref 11.5–14.5)
ERYTHROCYTE [DISTWIDTH] IN BLOOD BY AUTOMATED COUNT: 15.1 % (ref 11.5–14.5)
ERYTHROCYTE [DISTWIDTH] IN BLOOD BY AUTOMATED COUNT: 15.2 % (ref 11.5–14.5)
ERYTHROCYTE [DISTWIDTH] IN BLOOD BY AUTOMATED COUNT: 15.4 % (ref 11.5–14.5)
ERYTHROCYTE [DISTWIDTH] IN BLOOD BY AUTOMATED COUNT: 16.1 % (ref 11.5–14.5)
EST. AVERAGE GLUCOSE BLD GHB EST-MCNC: 146 MG/DL
FERRITIN SERPL-MCNC: 1234 NG/ML (ref 26–388)
FIO2 ON VENT: 100 %
FIO2 ON VENT: 60 %
FIO2 ON VENT: 80 %
FIO2 ON VENT: 80 %
FLUAV RNA SPEC QL NAA+PROBE: NOT DETECTED
FLUBV RNA SPEC QL NAA+PROBE: NOT DETECTED
GAS FLOW.O2 O2 DELIVERY SYS: ABNORMAL L/MIN
GAS FLOW.O2 O2 DELIVERY SYS: ABNORMAL L/MIN
GAS FLOW.O2 SETTING OXYMISER: 22 BPM
GAS FLOW.O2 SETTING OXYMISER: 22 BPM
GAS FLOW.O2 SETTING OXYMISER: 22 L/MIN
GAS FLOW.O2 SETTING OXYMISER: 28 L/MIN
GLOBULIN SER CALC-MCNC: 3.4 G/DL (ref 2–4)
GLOBULIN SER CALC-MCNC: 3.7 G/DL (ref 2–4)
GLOBULIN SER CALC-MCNC: 3.8 G/DL (ref 2–4)
GLOBULIN SER CALC-MCNC: 3.9 G/DL (ref 2–4)
GLOBULIN SER CALC-MCNC: 4 G/DL (ref 2–4)
GLOBULIN SER CALC-MCNC: 4.2 G/DL (ref 2–4)
GLOBULIN SER CALC-MCNC: 4.3 G/DL (ref 2–4)
GLOBULIN SER CALC-MCNC: 4.3 G/DL (ref 2–4)
GLOBULIN SER CALC-MCNC: 4.4 G/DL (ref 2–4)
GLOBULIN SER CALC-MCNC: 4.8 G/DL (ref 2–4)
GLOBULIN SER CALC-MCNC: 5.1 G/DL (ref 2–4)
GLOBULIN SER CALC-MCNC: 5.2 G/DL (ref 2–4)
GLOBULIN SER CALC-MCNC: 5.3 G/DL (ref 2–4)
GLOBULIN SER CALC-MCNC: 5.8 G/DL (ref 2–4)
GLUCOSE BLD STRIP.AUTO-MCNC: 102 MG/DL (ref 65–117)
GLUCOSE BLD STRIP.AUTO-MCNC: 107 MG/DL (ref 65–117)
GLUCOSE BLD STRIP.AUTO-MCNC: 112 MG/DL (ref 65–117)
GLUCOSE BLD STRIP.AUTO-MCNC: 114 MG/DL (ref 65–117)
GLUCOSE BLD STRIP.AUTO-MCNC: 116 MG/DL (ref 65–117)
GLUCOSE BLD STRIP.AUTO-MCNC: 117 MG/DL (ref 65–117)
GLUCOSE BLD STRIP.AUTO-MCNC: 127 MG/DL (ref 65–117)
GLUCOSE BLD STRIP.AUTO-MCNC: 127 MG/DL (ref 65–117)
GLUCOSE BLD STRIP.AUTO-MCNC: 128 MG/DL (ref 65–117)
GLUCOSE BLD STRIP.AUTO-MCNC: 128 MG/DL (ref 65–117)
GLUCOSE BLD STRIP.AUTO-MCNC: 132 MG/DL (ref 65–117)
GLUCOSE BLD STRIP.AUTO-MCNC: 134 MG/DL (ref 65–117)
GLUCOSE BLD STRIP.AUTO-MCNC: 135 MG/DL (ref 65–117)
GLUCOSE BLD STRIP.AUTO-MCNC: 138 MG/DL (ref 65–117)
GLUCOSE BLD STRIP.AUTO-MCNC: 141 MG/DL (ref 65–117)
GLUCOSE BLD STRIP.AUTO-MCNC: 141 MG/DL (ref 65–117)
GLUCOSE BLD STRIP.AUTO-MCNC: 142 MG/DL (ref 65–117)
GLUCOSE BLD STRIP.AUTO-MCNC: 144 MG/DL (ref 65–117)
GLUCOSE BLD STRIP.AUTO-MCNC: 145 MG/DL (ref 65–117)
GLUCOSE BLD STRIP.AUTO-MCNC: 148 MG/DL (ref 65–117)
GLUCOSE BLD STRIP.AUTO-MCNC: 153 MG/DL (ref 65–117)
GLUCOSE BLD STRIP.AUTO-MCNC: 154 MG/DL (ref 65–117)
GLUCOSE BLD STRIP.AUTO-MCNC: 156 MG/DL (ref 65–117)
GLUCOSE BLD STRIP.AUTO-MCNC: 156 MG/DL (ref 65–117)
GLUCOSE BLD STRIP.AUTO-MCNC: 157 MG/DL (ref 65–117)
GLUCOSE BLD STRIP.AUTO-MCNC: 159 MG/DL (ref 65–117)
GLUCOSE BLD STRIP.AUTO-MCNC: 160 MG/DL (ref 65–117)
GLUCOSE BLD STRIP.AUTO-MCNC: 166 MG/DL (ref 65–117)
GLUCOSE BLD STRIP.AUTO-MCNC: 167 MG/DL (ref 65–117)
GLUCOSE BLD STRIP.AUTO-MCNC: 167 MG/DL (ref 65–117)
GLUCOSE BLD STRIP.AUTO-MCNC: 168 MG/DL (ref 65–117)
GLUCOSE BLD STRIP.AUTO-MCNC: 177 MG/DL (ref 65–117)
GLUCOSE BLD STRIP.AUTO-MCNC: 177 MG/DL (ref 65–117)
GLUCOSE BLD STRIP.AUTO-MCNC: 178 MG/DL (ref 65–117)
GLUCOSE BLD STRIP.AUTO-MCNC: 180 MG/DL (ref 65–117)
GLUCOSE BLD STRIP.AUTO-MCNC: 182 MG/DL (ref 65–117)
GLUCOSE BLD STRIP.AUTO-MCNC: 182 MG/DL (ref 65–117)
GLUCOSE BLD STRIP.AUTO-MCNC: 184 MG/DL (ref 65–117)
GLUCOSE BLD STRIP.AUTO-MCNC: 187 MG/DL (ref 65–117)
GLUCOSE BLD STRIP.AUTO-MCNC: 188 MG/DL (ref 65–117)
GLUCOSE BLD STRIP.AUTO-MCNC: 188 MG/DL (ref 65–117)
GLUCOSE BLD STRIP.AUTO-MCNC: 189 MG/DL (ref 65–117)
GLUCOSE BLD STRIP.AUTO-MCNC: 189 MG/DL (ref 65–117)
GLUCOSE BLD STRIP.AUTO-MCNC: 191 MG/DL (ref 65–117)
GLUCOSE BLD STRIP.AUTO-MCNC: 192 MG/DL (ref 65–117)
GLUCOSE BLD STRIP.AUTO-MCNC: 195 MG/DL (ref 65–117)
GLUCOSE BLD STRIP.AUTO-MCNC: 198 MG/DL (ref 65–117)
GLUCOSE BLD STRIP.AUTO-MCNC: 201 MG/DL (ref 65–117)
GLUCOSE BLD STRIP.AUTO-MCNC: 204 MG/DL (ref 65–117)
GLUCOSE BLD STRIP.AUTO-MCNC: 205 MG/DL (ref 65–117)
GLUCOSE BLD STRIP.AUTO-MCNC: 213 MG/DL (ref 65–117)
GLUCOSE BLD STRIP.AUTO-MCNC: 219 MG/DL (ref 65–117)
GLUCOSE BLD STRIP.AUTO-MCNC: 220 MG/DL (ref 65–117)
GLUCOSE BLD STRIP.AUTO-MCNC: 224 MG/DL (ref 65–117)
GLUCOSE BLD STRIP.AUTO-MCNC: 228 MG/DL (ref 65–117)
GLUCOSE BLD STRIP.AUTO-MCNC: 230 MG/DL (ref 65–117)
GLUCOSE BLD STRIP.AUTO-MCNC: 237 MG/DL (ref 65–117)
GLUCOSE BLD STRIP.AUTO-MCNC: 242 MG/DL (ref 65–117)
GLUCOSE BLD STRIP.AUTO-MCNC: 244 MG/DL (ref 65–117)
GLUCOSE BLD STRIP.AUTO-MCNC: 254 MG/DL (ref 65–117)
GLUCOSE BLD STRIP.AUTO-MCNC: 257 MG/DL (ref 65–117)
GLUCOSE BLD STRIP.AUTO-MCNC: 267 MG/DL (ref 65–117)
GLUCOSE BLD STRIP.AUTO-MCNC: 301 MG/DL (ref 65–117)
GLUCOSE BLD STRIP.AUTO-MCNC: 34 MG/DL (ref 65–117)
GLUCOSE BLD STRIP.AUTO-MCNC: 49 MG/DL (ref 65–117)
GLUCOSE BLD STRIP.AUTO-MCNC: 66 MG/DL (ref 65–117)
GLUCOSE BLD STRIP.AUTO-MCNC: 74 MG/DL (ref 65–117)
GLUCOSE BLD STRIP.AUTO-MCNC: NORMAL MG/DL (ref 65–117)
GLUCOSE SERPL-MCNC: 128 MG/DL (ref 65–100)
GLUCOSE SERPL-MCNC: 134 MG/DL (ref 65–100)
GLUCOSE SERPL-MCNC: 136 MG/DL (ref 65–100)
GLUCOSE SERPL-MCNC: 141 MG/DL (ref 65–100)
GLUCOSE SERPL-MCNC: 142 MG/DL (ref 65–100)
GLUCOSE SERPL-MCNC: 145 MG/DL (ref 65–100)
GLUCOSE SERPL-MCNC: 146 MG/DL (ref 65–100)
GLUCOSE SERPL-MCNC: 149 MG/DL (ref 65–100)
GLUCOSE SERPL-MCNC: 152 MG/DL (ref 65–100)
GLUCOSE SERPL-MCNC: 154 MG/DL (ref 65–100)
GLUCOSE SERPL-MCNC: 159 MG/DL (ref 65–100)
GLUCOSE SERPL-MCNC: 162 MG/DL (ref 65–100)
GLUCOSE SERPL-MCNC: 195 MG/DL (ref 65–100)
GLUCOSE SERPL-MCNC: 196 MG/DL (ref 65–100)
GLUCOSE SERPL-MCNC: 199 MG/DL (ref 65–100)
GLUCOSE SERPL-MCNC: 200 MG/DL (ref 65–100)
GLUCOSE SERPL-MCNC: 225 MG/DL (ref 65–100)
GLUCOSE SERPL-MCNC: 235 MG/DL (ref 65–100)
GLUCOSE SERPL-MCNC: 297 MG/DL (ref 65–100)
GLUCOSE UR STRIP.AUTO-MCNC: NEGATIVE MG/DL
HBA1C MFR BLD: 6.7 % (ref 4–5.6)
HCG UR QL: NEGATIVE
HCO3 BLD-SCNC: 23.5 MMOL/L (ref 22–26)
HCO3 BLD-SCNC: 24.7 MMOL/L (ref 22–26)
HCO3 BLDA-SCNC: 20 MMOL/L (ref 22–26)
HCO3 BLDA-SCNC: 21 MMOL/L (ref 22–26)
HCO3 BLDA-SCNC: 22 MMOL/L (ref 22–26)
HCO3 BLDA-SCNC: 24 MMOL/L (ref 22–26)
HCO3 BLDA-SCNC: 25 MMOL/L (ref 22–26)
HCO3 BLDA-SCNC: 27 MMOL/L (ref 22–26)
HCO3 BLDA-SCNC: 28 MMOL/L (ref 22–26)
HCO3 BLDA-SCNC: 28 MMOL/L (ref 22–26)
HCO3 BLDA-SCNC: 30 MMOL/L (ref 22–26)
HCO3 BLDA-SCNC: 31 MMOL/L (ref 22–26)
HCO3 BLDA-SCNC: 32 MMOL/L (ref 22–26)
HCO3 BLDA-SCNC: 34 MMOL/L (ref 22–26)
HCO3 BLDA-SCNC: 36 MMOL/L (ref 22–26)
HCO3 BLDV-SCNC: 25 MMOL/L (ref 23–28)
HCO3 BLDV-SCNC: 28 MMOL/L (ref 23–28)
HCT VFR BLD AUTO: 31.7 % (ref 35–47)
HCT VFR BLD AUTO: 32.8 % (ref 35–47)
HCT VFR BLD AUTO: 34 % (ref 35–47)
HCT VFR BLD AUTO: 34.3 % (ref 35–47)
HCT VFR BLD AUTO: 35.7 % (ref 35–47)
HCT VFR BLD AUTO: 36.1 % (ref 35–47)
HCT VFR BLD AUTO: 36.6 % (ref 35–47)
HCT VFR BLD AUTO: 37.5 % (ref 35–47)
HCT VFR BLD AUTO: 37.9 % (ref 35–47)
HCT VFR BLD AUTO: 38.2 % (ref 35–47)
HCT VFR BLD AUTO: 38.8 % (ref 35–47)
HCT VFR BLD AUTO: 39.1 % (ref 35–47)
HCT VFR BLD AUTO: 39.2 % (ref 35–47)
HCT VFR BLD AUTO: 39.3 % (ref 35–47)
HCT VFR BLD AUTO: 39.3 % (ref 35–47)
HCT VFR BLD AUTO: 39.6 % (ref 35–47)
HCT VFR BLD AUTO: 41 % (ref 35–47)
HCT VFR BLD AUTO: 41.6 % (ref 35–47)
HCT VFR BLD AUTO: 43.6 % (ref 35–47)
HGB BLD-MCNC: 10 G/DL (ref 11.5–16)
HGB BLD-MCNC: 10 G/DL (ref 11.5–16)
HGB BLD-MCNC: 10.1 G/DL (ref 11.5–16)
HGB BLD-MCNC: 10.5 G/DL (ref 11.5–16)
HGB BLD-MCNC: 10.7 G/DL (ref 11.5–16)
HGB BLD-MCNC: 11 G/DL (ref 11.5–16)
HGB BLD-MCNC: 11.2 G/DL (ref 11.5–16)
HGB BLD-MCNC: 11.3 G/DL (ref 11.5–16)
HGB BLD-MCNC: 11.4 G/DL (ref 11.5–16)
HGB BLD-MCNC: 11.6 G/DL (ref 11.5–16)
HGB BLD-MCNC: 11.9 G/DL (ref 11.5–16)
HGB BLD-MCNC: 12.2 G/DL (ref 11.5–16)
HGB BLD-MCNC: 12.2 G/DL (ref 11.5–16)
HGB BLD-MCNC: 12.3 G/DL (ref 11.5–16)
HGB BLD-MCNC: 12.9 G/DL (ref 11.5–16)
HGB BLD-MCNC: 12.9 G/DL (ref 11.5–16)
HGB BLD-MCNC: 13 G/DL (ref 11.5–16)
HGB BLD-MCNC: 13.3 G/DL (ref 11.5–16)
HGB BLD-MCNC: 9.4 G/DL (ref 11.5–16)
HGB UR QL STRIP: ABNORMAL
IMM GRANULOCYTES # BLD AUTO: 0 K/UL
IMM GRANULOCYTES # BLD AUTO: 0 K/UL (ref 0–0.04)
IMM GRANULOCYTES # BLD AUTO: 0.1 K/UL (ref 0–0.04)
IMM GRANULOCYTES # BLD AUTO: 0.2 K/UL (ref 0–0.04)
IMM GRANULOCYTES NFR BLD AUTO: 0 %
IMM GRANULOCYTES NFR BLD AUTO: 0 % (ref 0–0.5)
IMM GRANULOCYTES NFR BLD AUTO: 1 % (ref 0–0.5)
IMM GRANULOCYTES NFR BLD AUTO: 2 % (ref 0–0.5)
IPAP/PIP, IPAPIP: 12
IPAP/PIP, IPAPIP: 12
IPAP/PIP, IPAPIP: 15
IPAP/PIP, IPAPIP: 18
IPAP/PIP, IPAPIP: 18
IPAP/PIP, IPAPIP: 20
IPAP/PIP, IPAPIP: 20
KETONES UR QL STRIP.AUTO: NEGATIVE MG/DL
LACTATE SERPL-SCNC: 0.8 MMOL/L (ref 0.4–2)
LACTATE SERPL-SCNC: 1.3 MMOL/L (ref 0.4–2)
LACTATE SERPL-SCNC: 1.4 MMOL/L (ref 0.4–2)
LACTATE SERPL-SCNC: 4.5 MMOL/L (ref 0.4–2)
LEUKOCYTE ESTERASE UR QL STRIP.AUTO: NEGATIVE
LIPASE SERPL-CCNC: 1002 U/L (ref 73–393)
LIPASE SERPL-CCNC: 1348 U/L (ref 73–393)
LIPASE SERPL-CCNC: 1362 U/L (ref 73–393)
LIPASE SERPL-CCNC: 1991 U/L (ref 73–393)
LIPASE SERPL-CCNC: 2740 U/L (ref 73–393)
LIPASE SERPL-CCNC: 882 U/L (ref 73–393)
LOW MW HEPARIN, ULMWHT: 0.93 IU/ML
LYMPHOCYTES # BLD: 0.3 K/UL (ref 0.8–3.5)
LYMPHOCYTES # BLD: 0.4 K/UL (ref 0.8–3.5)
LYMPHOCYTES # BLD: 0.6 K/UL (ref 0.8–3.5)
LYMPHOCYTES # BLD: 0.8 K/UL (ref 0.8–3.5)
LYMPHOCYTES # BLD: 0.8 K/UL (ref 0.8–3.5)
LYMPHOCYTES # BLD: 1.1 K/UL (ref 0.8–3.5)
LYMPHOCYTES # BLD: 1.2 K/UL (ref 0.8–3.5)
LYMPHOCYTES # BLD: 1.3 K/UL (ref 0.8–3.5)
LYMPHOCYTES # BLD: 1.5 K/UL (ref 0.8–3.5)
LYMPHOCYTES # BLD: 1.6 K/UL (ref 0.8–3.5)
LYMPHOCYTES # BLD: 1.7 K/UL (ref 0.8–3.5)
LYMPHOCYTES # BLD: 1.7 K/UL (ref 0.8–3.5)
LYMPHOCYTES # BLD: 1.8 K/UL (ref 0.8–3.5)
LYMPHOCYTES NFR BLD: 10 % (ref 12–49)
LYMPHOCYTES NFR BLD: 10 % (ref 12–49)
LYMPHOCYTES NFR BLD: 11 % (ref 12–49)
LYMPHOCYTES NFR BLD: 14 % (ref 12–49)
LYMPHOCYTES NFR BLD: 15 % (ref 12–49)
LYMPHOCYTES NFR BLD: 17 % (ref 12–49)
LYMPHOCYTES NFR BLD: 17 % (ref 12–49)
LYMPHOCYTES NFR BLD: 18 % (ref 12–49)
LYMPHOCYTES NFR BLD: 18 % (ref 12–49)
LYMPHOCYTES NFR BLD: 19 % (ref 12–49)
LYMPHOCYTES NFR BLD: 20 % (ref 12–49)
LYMPHOCYTES NFR BLD: 20 % (ref 12–49)
LYMPHOCYTES NFR BLD: 21 % (ref 12–49)
LYMPHOCYTES NFR BLD: 5 % (ref 12–49)
LYMPHOCYTES NFR BLD: 9 % (ref 12–49)
MAGNESIUM SERPL-MCNC: 2 MG/DL (ref 1.6–2.4)
MAGNESIUM SERPL-MCNC: 2.1 MG/DL (ref 1.6–2.4)
MAGNESIUM SERPL-MCNC: 2.2 MG/DL (ref 1.6–2.4)
MAGNESIUM SERPL-MCNC: 2.2 MG/DL (ref 1.6–2.4)
MAGNESIUM SERPL-MCNC: 2.3 MG/DL (ref 1.6–2.4)
MAGNESIUM SERPL-MCNC: 2.4 MG/DL (ref 1.6–2.4)
MAGNESIUM SERPL-MCNC: 2.4 MG/DL (ref 1.6–2.4)
MAGNESIUM SERPL-MCNC: 2.5 MG/DL (ref 1.6–2.4)
MAGNESIUM SERPL-MCNC: 2.6 MG/DL (ref 1.6–2.4)
MAGNESIUM SERPL-MCNC: 2.7 MG/DL (ref 1.6–2.4)
MCH RBC QN AUTO: 27.2 PG (ref 26–34)
MCH RBC QN AUTO: 27.2 PG (ref 26–34)
MCH RBC QN AUTO: 27.3 PG (ref 26–34)
MCH RBC QN AUTO: 27.4 PG (ref 26–34)
MCH RBC QN AUTO: 27.4 PG (ref 26–34)
MCH RBC QN AUTO: 27.5 PG (ref 26–34)
MCH RBC QN AUTO: 27.6 PG (ref 26–34)
MCH RBC QN AUTO: 27.7 PG (ref 26–34)
MCH RBC QN AUTO: 27.8 PG (ref 26–34)
MCH RBC QN AUTO: 27.9 PG (ref 26–34)
MCH RBC QN AUTO: 28.2 PG (ref 26–34)
MCH RBC QN AUTO: 28.3 PG (ref 26–34)
MCH RBC QN AUTO: 28.4 PG (ref 26–34)
MCH RBC QN AUTO: 29.1 PG (ref 26–34)
MCHC RBC AUTO-ENTMCNC: 28.4 G/DL (ref 30–36.5)
MCHC RBC AUTO-ENTMCNC: 28.8 G/DL (ref 30–36.5)
MCHC RBC AUTO-ENTMCNC: 29.1 G/DL (ref 30–36.5)
MCHC RBC AUTO-ENTMCNC: 29.2 G/DL (ref 30–36.5)
MCHC RBC AUTO-ENTMCNC: 29.3 G/DL (ref 30–36.5)
MCHC RBC AUTO-ENTMCNC: 29.6 G/DL (ref 30–36.5)
MCHC RBC AUTO-ENTMCNC: 29.7 G/DL (ref 30–36.5)
MCHC RBC AUTO-ENTMCNC: 29.7 G/DL (ref 30–36.5)
MCHC RBC AUTO-ENTMCNC: 30.5 G/DL (ref 30–36.5)
MCHC RBC AUTO-ENTMCNC: 30.5 G/DL (ref 30–36.5)
MCHC RBC AUTO-ENTMCNC: 31.4 G/DL (ref 30–36.5)
MCHC RBC AUTO-ENTMCNC: 31.7 G/DL (ref 30–36.5)
MCHC RBC AUTO-ENTMCNC: 31.7 G/DL (ref 30–36.5)
MCHC RBC AUTO-ENTMCNC: 31.9 G/DL (ref 30–36.5)
MCHC RBC AUTO-ENTMCNC: 32.2 G/DL (ref 30–36.5)
MCHC RBC AUTO-ENTMCNC: 32.8 G/DL (ref 30–36.5)
MCHC RBC AUTO-ENTMCNC: 32.8 G/DL (ref 30–36.5)
MCV RBC AUTO: 84.5 FL (ref 80–99)
MCV RBC AUTO: 85.5 FL (ref 80–99)
MCV RBC AUTO: 85.7 FL (ref 80–99)
MCV RBC AUTO: 88 FL (ref 80–99)
MCV RBC AUTO: 88.5 FL (ref 80–99)
MCV RBC AUTO: 89.5 FL (ref 80–99)
MCV RBC AUTO: 89.7 FL (ref 80–99)
MCV RBC AUTO: 90.2 FL (ref 80–99)
MCV RBC AUTO: 92.4 FL (ref 80–99)
MCV RBC AUTO: 92.9 FL (ref 80–99)
MCV RBC AUTO: 93.2 FL (ref 80–99)
MCV RBC AUTO: 93.3 FL (ref 80–99)
MCV RBC AUTO: 93.9 FL (ref 80–99)
MCV RBC AUTO: 94.1 FL (ref 80–99)
MCV RBC AUTO: 94.5 FL (ref 80–99)
MCV RBC AUTO: 95.1 FL (ref 80–99)
MCV RBC AUTO: 95.5 FL (ref 80–99)
MCV RBC AUTO: 95.8 FL (ref 80–99)
MCV RBC AUTO: 96.5 FL (ref 80–99)
METAMYELOCYTES NFR BLD MANUAL: 1 %
METAMYELOCYTES NFR BLD MANUAL: 1 %
METAMYELOCYTES NFR BLD MANUAL: 2 %
METAMYELOCYTES NFR BLD MANUAL: 3 %
MONOCYTES # BLD: 0.2 K/UL (ref 0–1)
MONOCYTES # BLD: 0.3 K/UL (ref 0–1)
MONOCYTES # BLD: 0.3 K/UL (ref 0–1)
MONOCYTES # BLD: 0.4 K/UL (ref 0–1)
MONOCYTES # BLD: 0.5 K/UL (ref 0–1)
MONOCYTES # BLD: 0.5 K/UL (ref 0–1)
MONOCYTES # BLD: 0.6 K/UL (ref 0–1)
MONOCYTES # BLD: 0.7 K/UL (ref 0–1)
MONOCYTES # BLD: 0.7 K/UL (ref 0–1)
MONOCYTES # BLD: 0.8 K/UL (ref 0–1)
MONOCYTES # BLD: 0.9 K/UL (ref 0–1)
MONOCYTES # BLD: 1 K/UL (ref 0–1)
MONOCYTES # BLD: 1.1 K/UL (ref 0–1)
MONOCYTES NFR BLD: 10 % (ref 5–13)
MONOCYTES NFR BLD: 11 % (ref 5–13)
MONOCYTES NFR BLD: 12 % (ref 5–13)
MONOCYTES NFR BLD: 3 % (ref 5–13)
MONOCYTES NFR BLD: 3 % (ref 5–13)
MONOCYTES NFR BLD: 4 % (ref 5–13)
MONOCYTES NFR BLD: 5 % (ref 5–13)
MONOCYTES NFR BLD: 7 % (ref 5–13)
MONOCYTES NFR BLD: 8 % (ref 5–13)
MONOCYTES NFR BLD: 8 % (ref 5–13)
MONOCYTES NFR BLD: 9 % (ref 5–13)
MYELOCYTES NFR BLD MANUAL: 1 %
MYELOCYTES NFR BLD MANUAL: 2 %
NEUTS BAND NFR BLD MANUAL: 1 %
NEUTS BAND NFR BLD MANUAL: 18 % (ref 0–6)
NEUTS BAND NFR BLD MANUAL: 4 %
NEUTS SEG # BLD: 13.8 K/UL (ref 1.8–8)
NEUTS SEG # BLD: 14.1 K/UL (ref 1.8–8)
NEUTS SEG # BLD: 14.2 K/UL (ref 1.8–8)
NEUTS SEG # BLD: 2.1 K/UL (ref 1.8–8)
NEUTS SEG # BLD: 3 K/UL (ref 1.8–8)
NEUTS SEG # BLD: 3.7 K/UL (ref 1.8–8)
NEUTS SEG # BLD: 4 K/UL (ref 1.8–8)
NEUTS SEG # BLD: 4.8 K/UL (ref 1.8–8)
NEUTS SEG # BLD: 4.9 K/UL (ref 1.8–8)
NEUTS SEG # BLD: 5.3 K/UL (ref 1.8–8)
NEUTS SEG # BLD: 5.6 K/UL (ref 1.8–8)
NEUTS SEG # BLD: 6.4 K/UL (ref 1.8–8)
NEUTS SEG # BLD: 6.4 K/UL (ref 1.8–8)
NEUTS SEG # BLD: 6.6 K/UL (ref 1.8–8)
NEUTS SEG # BLD: 6.7 K/UL (ref 1.8–8)
NEUTS SEG # BLD: 7.2 K/UL (ref 1.8–8)
NEUTS SEG # BLD: 7.9 K/UL (ref 1.8–8)
NEUTS SEG # BLD: 9.9 K/UL (ref 1.8–8)
NEUTS SEG NFR BLD: 60 % (ref 32–75)
NEUTS SEG NFR BLD: 65 % (ref 32–75)
NEUTS SEG NFR BLD: 67 % (ref 32–75)
NEUTS SEG NFR BLD: 70 % (ref 32–75)
NEUTS SEG NFR BLD: 70 % (ref 32–75)
NEUTS SEG NFR BLD: 71 % (ref 32–75)
NEUTS SEG NFR BLD: 71 % (ref 32–75)
NEUTS SEG NFR BLD: 72 % (ref 32–75)
NEUTS SEG NFR BLD: 74 % (ref 32–75)
NEUTS SEG NFR BLD: 75 % (ref 32–75)
NEUTS SEG NFR BLD: 76 % (ref 32–75)
NEUTS SEG NFR BLD: 78 % (ref 32–75)
NEUTS SEG NFR BLD: 79 % (ref 32–75)
NEUTS SEG NFR BLD: 80 % (ref 32–75)
NEUTS SEG NFR BLD: 80 % (ref 32–75)
NEUTS SEG NFR BLD: 83 % (ref 32–75)
NEUTS SEG NFR BLD: 85 % (ref 32–75)
NEUTS SEG NFR BLD: 87 % (ref 32–75)
NITRITE UR QL STRIP.AUTO: NEGATIVE
NRBC # BLD: 0 K/UL (ref 0–0.01)
NRBC # BLD: 0.03 K/UL (ref 0–0.01)
NRBC # BLD: 0.05 K/UL (ref 0–0.01)
NRBC # BLD: 0.08 K/UL (ref 0–0.01)
NRBC # BLD: 0.35 K/UL (ref 0–0.01)
NRBC BLD-RTO: 0 PER 100 WBC
NRBC BLD-RTO: 0.3 PER 100 WBC
NRBC BLD-RTO: 0.5 PER 100 WBC
NRBC BLD-RTO: 0.6 PER 100 WBC
NRBC BLD-RTO: 2.1 PER 100 WBC
O2/TOTAL GAS SETTING VFR VENT: 100 %
O2/TOTAL GAS SETTING VFR VENT: 100 %
P-R INTERVAL, ECG05: 142 MS
P-R INTERVAL, ECG05: 144 MS
PCO2 BLD: 40.9 MMHG (ref 35–45)
PCO2 BLD: 44.3 MMHG (ref 35–45)
PCO2 BLDA: 32 MMHG (ref 35–45)
PCO2 BLDA: 32 MMHG (ref 35–45)
PCO2 BLDA: 37 MMHG (ref 35–45)
PCO2 BLDA: 40 MMHG (ref 35–45)
PCO2 BLDA: 40 MMHG (ref 35–45)
PCO2 BLDA: 42 MMHG (ref 35–45)
PCO2 BLDA: 43 MMHG (ref 35–45)
PCO2 BLDA: 45 MMHG (ref 35–45)
PCO2 BLDA: 46 MMHG (ref 35–45)
PCO2 BLDA: 48 MMHG (ref 35–45)
PCO2 BLDA: 49 MMHG (ref 35–45)
PCO2 BLDA: 50 MMHG (ref 35–45)
PCO2 BLDA: 52 MMHG (ref 35–45)
PCO2 BLDA: 52 MMHG (ref 35–45)
PCO2 BLDA: 54 MMHG (ref 35–45)
PCO2 BLDA: 57 MMHG (ref 35–45)
PCO2 BLDA: 69 MMHG (ref 35–45)
PCO2 BLDA: 73 MMHG (ref 35–45)
PCO2 BLDV: 62.4 MMHG (ref 41–51)
PCO2 BLDV: 82.5 MMHG (ref 41–51)
PEEP RESPIRATORY: 14 CM[H2O]
PEEP RESPIRATORY: 15 CMH2O
PEEP RESPIRATORY: 15 CMH2O
PEEP RESPIRATORY: 16 CM[H2O]
PEEP RESPIRATORY: 16 CM[H2O]
PEEP RESPIRATORY: 18 CM[H2O]
PEEP RESPIRATORY: 20 CM[H2O]
PH BLD: 7.35 [PH] (ref 7.35–7.45)
PH BLD: 7.37 [PH] (ref 7.35–7.45)
PH BLDA: 7.21 [PH] (ref 7.35–7.45)
PH BLDA: 7.25 [PH] (ref 7.35–7.45)
PH BLDA: 7.28 [PH] (ref 7.35–7.45)
PH BLDA: 7.3 [PH] (ref 7.35–7.45)
PH BLDA: 7.32 [PH] (ref 7.35–7.45)
PH BLDA: 7.32 [PH] (ref 7.35–7.45)
PH BLDA: 7.34 [PH] (ref 7.35–7.45)
PH BLDA: 7.37 [PH] (ref 7.35–7.45)
PH BLDA: 7.37 [PH] (ref 7.35–7.45)
PH BLDA: 7.39 [PH] (ref 7.35–7.45)
PH BLDA: 7.4 [PH] (ref 7.35–7.45)
PH BLDA: 7.4 [PH] (ref 7.35–7.45)
PH BLDA: 7.41 [PH] (ref 7.35–7.45)
PH BLDA: 7.42 [PH] (ref 7.35–7.45)
PH BLDA: 7.43 [PH] (ref 7.35–7.45)
PH BLDA: 7.47 [PH] (ref 7.35–7.45)
PH BLDA: 7.5 [PH] (ref 7.35–7.45)
PH BLDA: 7.52 [PH] (ref 7.35–7.45)
PH BLDV: 7.1 [PH] (ref 7.32–7.42)
PH BLDV: 7.28 [PH] (ref 7.32–7.42)
PH UR STRIP: 6 [PH] (ref 5–8)
PHOSPHATE SERPL-MCNC: 2.1 MG/DL (ref 2.6–4.7)
PHOSPHATE SERPL-MCNC: 2.4 MG/DL (ref 2.6–4.7)
PHOSPHATE SERPL-MCNC: 2.4 MG/DL (ref 2.6–4.7)
PHOSPHATE SERPL-MCNC: 2.6 MG/DL (ref 2.6–4.7)
PHOSPHATE SERPL-MCNC: 2.8 MG/DL (ref 2.6–4.7)
PHOSPHATE SERPL-MCNC: 2.9 MG/DL (ref 2.6–4.7)
PHOSPHATE SERPL-MCNC: 3 MG/DL (ref 2.6–4.7)
PHOSPHATE SERPL-MCNC: 3 MG/DL (ref 2.6–4.7)
PHOSPHATE SERPL-MCNC: 3.1 MG/DL (ref 2.6–4.7)
PHOSPHATE SERPL-MCNC: 3.2 MG/DL (ref 2.6–4.7)
PHOSPHATE SERPL-MCNC: 3.3 MG/DL (ref 2.6–4.7)
PHOSPHATE SERPL-MCNC: 3.4 MG/DL (ref 2.6–4.7)
PHOSPHATE SERPL-MCNC: 3.5 MG/DL (ref 2.6–4.7)
PHOSPHATE SERPL-MCNC: 3.7 MG/DL (ref 2.6–4.7)
PHOSPHATE SERPL-MCNC: 3.7 MG/DL (ref 2.6–4.7)
PHOSPHATE SERPL-MCNC: 3.8 MG/DL (ref 2.6–4.7)
PHOSPHATE SERPL-MCNC: 4.7 MG/DL (ref 2.6–4.7)
PHOSPHATE SERPL-MCNC: 5.1 MG/DL (ref 2.6–4.7)
PLATELET # BLD AUTO: 142 K/UL (ref 150–400)
PLATELET # BLD AUTO: 143 K/UL (ref 150–400)
PLATELET # BLD AUTO: 144 K/UL (ref 150–400)
PLATELET # BLD AUTO: 149 K/UL (ref 150–400)
PLATELET # BLD AUTO: 156 K/UL (ref 150–400)
PLATELET # BLD AUTO: 162 K/UL (ref 150–400)
PLATELET # BLD AUTO: 163 K/UL (ref 150–400)
PLATELET # BLD AUTO: 177 K/UL (ref 150–400)
PLATELET # BLD AUTO: 179 K/UL (ref 150–400)
PLATELET # BLD AUTO: 182 K/UL (ref 150–400)
PLATELET # BLD AUTO: 184 K/UL (ref 150–400)
PLATELET # BLD AUTO: 190 K/UL (ref 150–400)
PLATELET # BLD AUTO: 194 K/UL (ref 150–400)
PLATELET # BLD AUTO: 208 K/UL (ref 150–400)
PLATELET # BLD AUTO: 226 K/UL (ref 150–400)
PLATELET # BLD AUTO: 231 K/UL (ref 150–400)
PLATELET # BLD AUTO: 245 K/UL (ref 150–400)
PLATELET # BLD AUTO: 256 K/UL (ref 150–400)
PLATELET # BLD AUTO: 264 K/UL (ref 150–400)
PMV BLD AUTO: 10 FL (ref 8.9–12.9)
PMV BLD AUTO: 10 FL (ref 8.9–12.9)
PMV BLD AUTO: 10.2 FL (ref 8.9–12.9)
PMV BLD AUTO: 10.4 FL (ref 8.9–12.9)
PMV BLD AUTO: 10.5 FL (ref 8.9–12.9)
PMV BLD AUTO: 10.5 FL (ref 8.9–12.9)
PMV BLD AUTO: 11.5 FL (ref 8.9–12.9)
PMV BLD AUTO: 11.6 FL (ref 8.9–12.9)
PMV BLD AUTO: 11.8 FL (ref 8.9–12.9)
PMV BLD AUTO: 12 FL (ref 8.9–12.9)
PMV BLD AUTO: 12.1 FL (ref 8.9–12.9)
PMV BLD AUTO: 12.1 FL (ref 8.9–12.9)
PMV BLD AUTO: 12.3 FL (ref 8.9–12.9)
PMV BLD AUTO: 9.4 FL (ref 8.9–12.9)
PMV BLD AUTO: 9.7 FL (ref 8.9–12.9)
PMV BLD AUTO: 9.7 FL (ref 8.9–12.9)
PMV BLD AUTO: 9.9 FL (ref 8.9–12.9)
PO2 BLD: 58 MMHG (ref 80–100)
PO2 BLD: 60 MMHG (ref 80–100)
PO2 BLDA: 100 MMHG (ref 80–100)
PO2 BLDA: 118 MMHG (ref 80–100)
PO2 BLDA: 163 MMHG (ref 80–100)
PO2 BLDA: 266 MMHG (ref 80–100)
PO2 BLDA: 31 MMHG (ref 80–100)
PO2 BLDA: 43 MMHG (ref 80–100)
PO2 BLDA: 51 MMHG (ref 80–100)
PO2 BLDA: 51 MMHG (ref 80–100)
PO2 BLDA: 54 MMHG (ref 80–100)
PO2 BLDA: 57 MMHG (ref 80–100)
PO2 BLDA: 58 MMHG (ref 80–100)
PO2 BLDA: 59 MMHG (ref 80–100)
PO2 BLDA: 60 MMHG (ref 80–100)
PO2 BLDA: 62 MMHG (ref 80–100)
PO2 BLDA: 66 MMHG (ref 80–100)
PO2 BLDA: 66 MMHG (ref 80–100)
PO2 BLDA: 69 MMHG (ref 80–100)
PO2 BLDA: 72 MMHG (ref 80–100)
PO2 BLDA: 76 MMHG (ref 80–100)
PO2 BLDA: 96 MMHG (ref 80–100)
PO2 BLDV: 18 MMHG (ref 25–40)
PO2 BLDV: 41 MMHG (ref 25–40)
POTASSIUM SERPL-SCNC: 3.9 MMOL/L (ref 3.5–5.1)
POTASSIUM SERPL-SCNC: 4 MMOL/L (ref 3.5–5.1)
POTASSIUM SERPL-SCNC: 4 MMOL/L (ref 3.5–5.1)
POTASSIUM SERPL-SCNC: 4.1 MMOL/L (ref 3.5–5.1)
POTASSIUM SERPL-SCNC: 4.2 MMOL/L (ref 3.5–5.1)
POTASSIUM SERPL-SCNC: 4.3 MMOL/L (ref 3.5–5.1)
POTASSIUM SERPL-SCNC: 4.4 MMOL/L (ref 3.5–5.1)
POTASSIUM SERPL-SCNC: 4.4 MMOL/L (ref 3.5–5.1)
POTASSIUM SERPL-SCNC: 4.5 MMOL/L (ref 3.5–5.1)
POTASSIUM SERPL-SCNC: 4.7 MMOL/L (ref 3.5–5.1)
POTASSIUM SERPL-SCNC: 5.4 MMOL/L (ref 3.5–5.1)
PROCALCITONIN SERPL-MCNC: 0.18 NG/ML
PROCALCITONIN SERPL-MCNC: 0.32 NG/ML
PROCALCITONIN SERPL-MCNC: <0.05 NG/ML
PROT SERPL-MCNC: 6.3 G/DL (ref 6.4–8.2)
PROT SERPL-MCNC: 6.4 G/DL (ref 6.4–8.2)
PROT SERPL-MCNC: 6.4 G/DL (ref 6.4–8.2)
PROT SERPL-MCNC: 6.7 G/DL (ref 6.4–8.2)
PROT SERPL-MCNC: 6.8 G/DL (ref 6.4–8.2)
PROT SERPL-MCNC: 6.9 G/DL (ref 6.4–8.2)
PROT SERPL-MCNC: 6.9 G/DL (ref 6.4–8.2)
PROT SERPL-MCNC: 7.1 G/DL (ref 6.4–8.2)
PROT SERPL-MCNC: 7.2 G/DL (ref 6.4–8.2)
PROT SERPL-MCNC: 7.3 G/DL (ref 6.4–8.2)
PROT SERPL-MCNC: 7.3 G/DL (ref 6.4–8.2)
PROT SERPL-MCNC: 7.6 G/DL (ref 6.4–8.2)
PROT SERPL-MCNC: 7.6 G/DL (ref 6.4–8.2)
PROT SERPL-MCNC: 7.9 G/DL (ref 6.4–8.2)
PROT SERPL-MCNC: 7.9 G/DL (ref 6.4–8.2)
PROT SERPL-MCNC: 8 G/DL (ref 6.4–8.2)
PROT SERPL-MCNC: 8 G/DL (ref 6.4–8.2)
PROT SERPL-MCNC: 8.1 G/DL (ref 6.4–8.2)
PROT SERPL-MCNC: 8.9 G/DL (ref 6.4–8.2)
PROT UR STRIP-MCNC: NEGATIVE MG/DL
Q-T INTERVAL, ECG07: 320 MS
Q-T INTERVAL, ECG07: 324 MS
Q-T INTERVAL, ECG07: 336 MS
QRS DURATION, ECG06: 154 MS
QRS DURATION, ECG06: 80 MS
QRS DURATION, ECG06: 82 MS
QTC CALCULATION (BEZET), ECG08: 404 MS
QTC CALCULATION (BEZET), ECG08: 407 MS
QTC CALCULATION (BEZET), ECG08: 470 MS
RBC # BLD AUTO: 3.4 M/UL (ref 3.8–5.2)
RBC # BLD AUTO: 3.55 M/UL (ref 3.8–5.2)
RBC # BLD AUTO: 3.59 M/UL (ref 3.8–5.2)
RBC # BLD AUTO: 3.62 M/UL (ref 3.8–5.2)
RBC # BLD AUTO: 3.77 M/UL (ref 3.8–5.2)
RBC # BLD AUTO: 3.85 M/UL (ref 3.8–5.2)
RBC # BLD AUTO: 3.96 M/UL (ref 3.8–5.2)
RBC # BLD AUTO: 4.02 M/UL (ref 3.8–5.2)
RBC # BLD AUTO: 4.15 M/UL (ref 3.8–5.2)
RBC # BLD AUTO: 4.19 M/UL (ref 3.8–5.2)
RBC # BLD AUTO: 4.19 M/UL (ref 3.8–5.2)
RBC # BLD AUTO: 4.21 M/UL (ref 3.8–5.2)
RBC # BLD AUTO: 4.42 M/UL (ref 3.8–5.2)
RBC # BLD AUTO: 4.44 M/UL (ref 3.8–5.2)
RBC # BLD AUTO: 4.47 M/UL (ref 3.8–5.2)
RBC # BLD AUTO: 4.48 M/UL (ref 3.8–5.2)
RBC # BLD AUTO: 4.65 M/UL (ref 3.8–5.2)
RBC # BLD AUTO: 4.66 M/UL (ref 3.8–5.2)
RBC # BLD AUTO: 4.86 M/UL (ref 3.8–5.2)
RBC #/AREA URNS HPF: ABNORMAL /HPF (ref 0–5)
RBC MORPH BLD: ABNORMAL
RBC MORPH BLD: NORMAL
REPORTED DOSE,DOSE: ABNORMAL UNITS
REPORTED DOSE/TIME,TMG: ABNORMAL
SAO2 % BLD: 100 % (ref 92–97)
SAO2 % BLD: 52 % (ref 92–97)
SAO2 % BLD: 78 % (ref 92–97)
SAO2 % BLD: 84 % (ref 92–97)
SAO2 % BLD: 85 % (ref 92–97)
SAO2 % BLD: 86 % (ref 92–97)
SAO2 % BLD: 86 % (ref 92–97)
SAO2 % BLD: 88.3 % (ref 92–97)
SAO2 % BLD: 89 % (ref 92–97)
SAO2 % BLD: 89.8 % (ref 92–97)
SAO2 % BLD: 90 % (ref 92–97)
SAO2 % BLD: 91 % (ref 92–97)
SAO2 % BLD: 92 % (ref 92–97)
SAO2 % BLD: 93 % (ref 92–97)
SAO2 % BLD: 94 % (ref 92–97)
SAO2 % BLD: 96 % (ref 92–97)
SAO2 % BLD: 97 % (ref 92–97)
SAO2 % BLD: 98 % (ref 92–97)
SAO2 % BLD: 98 % (ref 92–97)
SAO2 % BLD: 99 % (ref 92–97)
SAO2 % BLDV: 16 % (ref 65–88)
SAO2 % BLDV: 69 % (ref 65–88)
SAO2% DEVICE SAO2% SENSOR NAME: ABNORMAL
SARS-COV-2, COV2: DETECTED
SERVICE CMNT-IMP: ABNORMAL
SERVICE CMNT-IMP: NORMAL
SODIUM SERPL-SCNC: 130 MMOL/L (ref 136–145)
SODIUM SERPL-SCNC: 136 MMOL/L (ref 136–145)
SODIUM SERPL-SCNC: 139 MMOL/L (ref 136–145)
SODIUM SERPL-SCNC: 140 MMOL/L (ref 136–145)
SODIUM SERPL-SCNC: 142 MMOL/L (ref 136–145)
SODIUM SERPL-SCNC: 143 MMOL/L (ref 136–145)
SODIUM SERPL-SCNC: 144 MMOL/L (ref 136–145)
SODIUM SERPL-SCNC: 145 MMOL/L (ref 136–145)
SODIUM SERPL-SCNC: 146 MMOL/L (ref 136–145)
SODIUM SERPL-SCNC: 147 MMOL/L (ref 136–145)
SODIUM SERPL-SCNC: 148 MMOL/L (ref 136–145)
SP GR UR REFRACTOMETRY: <1.005 (ref 1–1.03)
SPECIMEN SITE: ABNORMAL
SPECIMEN TYPE: ABNORMAL
SPECIMEN TYPE: ABNORMAL
TRIGL SERPL-MCNC: 1027 MG/DL (ref ?–150)
TRIGL SERPL-MCNC: 1607 MG/DL (ref ?–150)
TRIGL SERPL-MCNC: 2166 MG/DL (ref ?–150)
TRIGL SERPL-MCNC: 386 MG/DL (ref ?–150)
TRIGL SERPL-MCNC: 515 MG/DL (ref ?–150)
TRIGL SERPL-MCNC: 735 MG/DL (ref ?–150)
TROPONIN I SERPL-MCNC: 0.06 NG/ML
UA: UC IF INDICATED,UAUC: ABNORMAL
UROBILINOGEN UR QL STRIP.AUTO: 1 EU/DL (ref 0.2–1)
VANCOMYCIN TROUGH SERPL-MCNC: 14.1 UG/ML (ref 5–10)
VANCOMYCIN TROUGH SERPL-MCNC: 18.8 UG/ML (ref 5–10)
VENTILATION MODE VENT: ABNORMAL
VENTRICULAR RATE, ECG03: 118 BPM
VENTRICULAR RATE, ECG03: 95 BPM
VENTRICULAR RATE, ECG03: 96 BPM
VT SETTING VENT: 270 ML
VT SETTING VENT: 450 ML
VT SETTING VENT: 450 ML
WBC # BLD AUTO: 10 K/UL (ref 3.6–11)
WBC # BLD AUTO: 12.4 K/UL (ref 3.6–11)
WBC # BLD AUTO: 16 K/UL (ref 3.6–11)
WBC # BLD AUTO: 16.5 K/UL (ref 3.6–11)
WBC # BLD AUTO: 16.6 K/UL (ref 3.6–11)
WBC # BLD AUTO: 3 K/UL (ref 3.6–11)
WBC # BLD AUTO: 3.8 K/UL (ref 3.6–11)
WBC # BLD AUTO: 4.4 K/UL (ref 3.6–11)
WBC # BLD AUTO: 4.9 K/UL (ref 3.6–11)
WBC # BLD AUTO: 6.3 K/UL (ref 3.6–11)
WBC # BLD AUTO: 6.3 K/UL (ref 3.6–11)
WBC # BLD AUTO: 6.9 K/UL (ref 3.6–11)
WBC # BLD AUTO: 7.4 K/UL (ref 3.6–11)
WBC # BLD AUTO: 8.3 K/UL (ref 3.6–11)
WBC # BLD AUTO: 8.4 K/UL (ref 3.6–11)
WBC # BLD AUTO: 8.6 K/UL (ref 3.6–11)
WBC # BLD AUTO: 8.9 K/UL (ref 3.6–11)
WBC # BLD AUTO: 9.5 K/UL (ref 3.6–11)
WBC # BLD AUTO: 9.5 K/UL (ref 3.6–11)
WBC MORPH BLD: ABNORMAL
WBC MORPH BLD: NORMAL
WBC URNS QL MICRO: ABNORMAL /HPF (ref 0–4)

## 2021-01-01 PROCEDURE — 74011000250 HC RX REV CODE- 250: Performed by: INTERNAL MEDICINE

## 2021-01-01 PROCEDURE — 36415 COLL VENOUS BLD VENIPUNCTURE: CPT

## 2021-01-01 PROCEDURE — 74011250636 HC RX REV CODE- 250/636: Performed by: HOSPITALIST

## 2021-01-01 PROCEDURE — 65610000006 HC RM INTENSIVE CARE

## 2021-01-01 PROCEDURE — 82962 GLUCOSE BLOOD TEST: CPT

## 2021-01-01 PROCEDURE — C9113 INJ PANTOPRAZOLE SODIUM, VIA: HCPCS | Performed by: NURSE PRACTITIONER

## 2021-01-01 PROCEDURE — 84100 ASSAY OF PHOSPHORUS: CPT

## 2021-01-01 PROCEDURE — 82550 ASSAY OF CK (CPK): CPT

## 2021-01-01 PROCEDURE — 85379 FIBRIN DEGRADATION QUANT: CPT

## 2021-01-01 PROCEDURE — 74011250637 HC RX REV CODE- 250/637: Performed by: HOSPITALIST

## 2021-01-01 PROCEDURE — 74011000258 HC RX REV CODE- 258: Performed by: HOSPITALIST

## 2021-01-01 PROCEDURE — 2709999900 HC NON-CHARGEABLE SUPPLY

## 2021-01-01 PROCEDURE — 74011000250 HC RX REV CODE- 250: Performed by: NURSE PRACTITIONER

## 2021-01-01 PROCEDURE — 74011000250 HC RX REV CODE- 250: Performed by: HOSPITALIST

## 2021-01-01 PROCEDURE — 74011250636 HC RX REV CODE- 250/636: Performed by: NURSE PRACTITIONER

## 2021-01-01 PROCEDURE — 94640 AIRWAY INHALATION TREATMENT: CPT

## 2021-01-01 PROCEDURE — 74011000258 HC RX REV CODE- 258: Performed by: INTERNAL MEDICINE

## 2021-01-01 PROCEDURE — 99232 SBSQ HOSP IP/OBS MODERATE 35: CPT | Performed by: CLINICAL NURSE SPECIALIST

## 2021-01-01 PROCEDURE — 94002 VENT MGMT INPAT INIT DAY: CPT

## 2021-01-01 PROCEDURE — 83036 HEMOGLOBIN GLYCOSYLATED A1C: CPT

## 2021-01-01 PROCEDURE — 74011250636 HC RX REV CODE- 250/636: Performed by: INTERNAL MEDICINE

## 2021-01-01 PROCEDURE — 84478 ASSAY OF TRIGLYCERIDES: CPT

## 2021-01-01 PROCEDURE — 85025 COMPLETE CBC W/AUTO DIFF WBC: CPT

## 2021-01-01 PROCEDURE — 83735 ASSAY OF MAGNESIUM: CPT

## 2021-01-01 PROCEDURE — 84484 ASSAY OF TROPONIN QUANT: CPT

## 2021-01-01 PROCEDURE — 94003 VENT MGMT INPAT SUBQ DAY: CPT

## 2021-01-01 PROCEDURE — 80053 COMPREHEN METABOLIC PANEL: CPT

## 2021-01-01 PROCEDURE — 74011250636 HC RX REV CODE- 250/636: Performed by: HEALTH CARE PROVIDER

## 2021-01-01 PROCEDURE — 74011000250 HC RX REV CODE- 250

## 2021-01-01 PROCEDURE — 74011250637 HC RX REV CODE- 250/637: Performed by: INTERNAL MEDICINE

## 2021-01-01 PROCEDURE — 77030040831 HC BAG URINE DRNG MDII -A

## 2021-01-01 PROCEDURE — 87624 HPV HI-RISK TYP POOLED RSLT: CPT

## 2021-01-01 PROCEDURE — 74011250637 HC RX REV CODE- 250/637: Performed by: NURSE PRACTITIONER

## 2021-01-01 PROCEDURE — 74011636637 HC RX REV CODE- 636/637: Performed by: INTERNAL MEDICINE

## 2021-01-01 PROCEDURE — 74011636637 HC RX REV CODE- 636/637: Performed by: HOSPITALIST

## 2021-01-01 PROCEDURE — 82803 BLOOD GASES ANY COMBINATION: CPT

## 2021-01-01 PROCEDURE — P9045 ALBUMIN (HUMAN), 5%, 250 ML: HCPCS | Performed by: INTERNAL MEDICINE

## 2021-01-01 PROCEDURE — 93005 ELECTROCARDIOGRAM TRACING: CPT

## 2021-01-01 PROCEDURE — 74011000258 HC RX REV CODE- 258: Performed by: STUDENT IN AN ORGANIZED HEALTH CARE EDUCATION/TRAINING PROGRAM

## 2021-01-01 PROCEDURE — 86140 C-REACTIVE PROTEIN: CPT

## 2021-01-01 PROCEDURE — 74011250636 HC RX REV CODE- 250/636

## 2021-01-01 PROCEDURE — 99233 SBSQ HOSP IP/OBS HIGH 50: CPT | Performed by: NURSE PRACTITIONER

## 2021-01-01 PROCEDURE — 74011250636 HC RX REV CODE- 250/636: Performed by: EMERGENCY MEDICINE

## 2021-01-01 PROCEDURE — 87040 BLOOD CULTURE FOR BACTERIA: CPT

## 2021-01-01 PROCEDURE — APPSS180 APP SPLIT SHARED TIME > 60 MINUTES: Performed by: THORACIC SURGERY (CARDIOTHORACIC VASCULAR SURGERY)

## 2021-01-01 PROCEDURE — 74011000258 HC RX REV CODE- 258: Performed by: HEALTH CARE PROVIDER

## 2021-01-01 PROCEDURE — 99214 OFFICE O/P EST MOD 30 MIN: CPT | Performed by: NURSE PRACTITIONER

## 2021-01-01 PROCEDURE — 81025 URINE PREGNANCY TEST: CPT

## 2021-01-01 PROCEDURE — 87636 SARSCOV2 & INF A&B AMP PRB: CPT

## 2021-01-01 PROCEDURE — 74011000258 HC RX REV CODE- 258: Performed by: NURSE PRACTITIONER

## 2021-01-01 PROCEDURE — 71045 X-RAY EXAM CHEST 1 VIEW: CPT

## 2021-01-01 PROCEDURE — 36600 WITHDRAWAL OF ARTERIAL BLOOD: CPT

## 2021-01-01 PROCEDURE — 03HY32Z INSERTION OF MONITORING DEVICE INTO UPPER ARTERY, PERCUTANEOUS APPROACH: ICD-10-PCS | Performed by: INTERNAL MEDICINE

## 2021-01-01 PROCEDURE — 99285 EMERGENCY DEPT VISIT HI MDM: CPT

## 2021-01-01 PROCEDURE — 83690 ASSAY OF LIPASE: CPT

## 2021-01-01 PROCEDURE — 80076 HEPATIC FUNCTION PANEL: CPT

## 2021-01-01 PROCEDURE — 77030013797 HC KT TRNSDUC PRSSR EDWD -A

## 2021-01-01 PROCEDURE — 77030012879 HC MSK CPAP FLL FAC PHIL -B

## 2021-01-01 PROCEDURE — 0BH17EZ INSERTION OF ENDOTRACHEAL AIRWAY INTO TRACHEA, VIA NATURAL OR ARTIFICIAL OPENING: ICD-10-PCS | Performed by: ANESTHESIOLOGY

## 2021-01-01 PROCEDURE — 99223 1ST HOSP IP/OBS HIGH 75: CPT | Performed by: THORACIC SURGERY (CARDIOTHORACIC VASCULAR SURGERY)

## 2021-01-01 PROCEDURE — 83605 ASSAY OF LACTIC ACID: CPT

## 2021-01-01 PROCEDURE — 65270000032 HC RM SEMIPRIVATE

## 2021-01-01 PROCEDURE — 99233 SBSQ HOSP IP/OBS HIGH 50: CPT | Performed by: CLINICAL NURSE SPECIALIST

## 2021-01-01 PROCEDURE — 80202 ASSAY OF VANCOMYCIN: CPT

## 2021-01-01 PROCEDURE — 80048 BASIC METABOLIC PNL TOTAL CA: CPT

## 2021-01-01 PROCEDURE — 77010033678 HC OXYGEN DAILY

## 2021-01-01 PROCEDURE — C1751 CATH, INF, PER/CENT/MIDLINE: HCPCS

## 2021-01-01 PROCEDURE — 99223 1ST HOSP IP/OBS HIGH 75: CPT | Performed by: SURGERY

## 2021-01-01 PROCEDURE — 02H633Z INSERTION OF INFUSION DEVICE INTO RIGHT ATRIUM, PERCUTANEOUS APPROACH: ICD-10-PCS | Performed by: INTERNAL MEDICINE

## 2021-01-01 PROCEDURE — 82248 BILIRUBIN DIRECT: CPT

## 2021-01-01 PROCEDURE — 94660 CPAP INITIATION&MGMT: CPT

## 2021-01-01 PROCEDURE — 74011000250 HC RX REV CODE- 250: Performed by: EMERGENCY MEDICINE

## 2021-01-01 PROCEDURE — 74011000250 HC RX REV CODE- 250: Performed by: STUDENT IN AN ORGANIZED HEALTH CARE EDUCATION/TRAINING PROGRAM

## 2021-01-01 PROCEDURE — 77030005513 HC CATH URETH FOL11 MDII -B

## 2021-01-01 PROCEDURE — 88175 CYTOPATH C/V AUTO FLUID REDO: CPT

## 2021-01-01 PROCEDURE — 77030027138 HC INCENT SPIROMETER -A

## 2021-01-01 PROCEDURE — 99395 PREV VISIT EST AGE 18-39: CPT | Performed by: NURSE PRACTITIONER

## 2021-01-01 PROCEDURE — 85520 HEPARIN ASSAY: CPT

## 2021-01-01 PROCEDURE — 5A1955Z RESPIRATORY VENTILATION, GREATER THAN 96 CONSECUTIVE HOURS: ICD-10-PCS | Performed by: INTERNAL MEDICINE

## 2021-01-01 PROCEDURE — 77030018798 HC PMP KT ENTRL FED COVD -A

## 2021-01-01 PROCEDURE — XW033E5 INTRODUCTION OF REMDESIVIR ANTI-INFECTIVE INTO PERIPHERAL VEIN, PERCUTANEOUS APPROACH, NEW TECHNOLOGY GROUP 5: ICD-10-PCS | Performed by: STUDENT IN AN ORGANIZED HEALTH CARE EDUCATION/TRAINING PROGRAM

## 2021-01-01 PROCEDURE — 85027 COMPLETE CBC AUTOMATED: CPT

## 2021-01-01 PROCEDURE — 76700 US EXAM ABDOM COMPLETE: CPT

## 2021-01-01 PROCEDURE — 81001 URINALYSIS AUTO W/SCOPE: CPT

## 2021-01-01 PROCEDURE — 77030005402 HC CATH RAD ART LN KT TELE -B

## 2021-01-01 PROCEDURE — 96375 TX/PRO/DX INJ NEW DRUG ADDON: CPT

## 2021-01-01 PROCEDURE — 74011250637 HC RX REV CODE- 250/637: Performed by: STUDENT IN AN ORGANIZED HEALTH CARE EDUCATION/TRAINING PROGRAM

## 2021-01-01 PROCEDURE — 99231 SBSQ HOSP IP/OBS SF/LOW 25: CPT | Performed by: NURSE PRACTITIONER

## 2021-01-01 PROCEDURE — 74018 RADEX ABDOMEN 1 VIEW: CPT

## 2021-01-01 PROCEDURE — XW033H5 INTRODUCTION OF TOCILIZUMAB INTO PERIPHERAL VEIN, PERCUTANEOUS APPROACH, NEW TECHNOLOGY GROUP 5: ICD-10-PCS | Performed by: INTERNAL MEDICINE

## 2021-01-01 PROCEDURE — 96374 THER/PROPH/DIAG INJ IV PUSH: CPT

## 2021-01-01 PROCEDURE — 99221 1ST HOSP IP/OBS SF/LOW 40: CPT | Performed by: NURSE PRACTITIONER

## 2021-01-01 PROCEDURE — 77030040393 HC DRSG OPTIFOAM GENT MDII -B

## 2021-01-01 PROCEDURE — 84145 PROCALCITONIN (PCT): CPT

## 2021-01-01 PROCEDURE — 86141 C-REACTIVE PROTEIN HS: CPT

## 2021-01-01 PROCEDURE — 82728 ASSAY OF FERRITIN: CPT

## 2021-01-01 PROCEDURE — 77030019905 HC CATH URETH INTMIT MDII -A

## 2021-01-01 PROCEDURE — 74011250637 HC RX REV CODE- 250/637: Performed by: EMERGENCY MEDICINE

## 2021-01-01 RX ORDER — ENOXAPARIN SODIUM 100 MG/ML
100 INJECTION SUBCUTANEOUS EVERY 12 HOURS
Status: DISCONTINUED | OUTPATIENT
Start: 2021-01-01 | End: 2021-01-01

## 2021-01-01 RX ORDER — EPINEPHRINE 1 MG/ML
0.3 INJECTION, SOLUTION, CONCENTRATE INTRAVENOUS
Status: ACTIVE | OUTPATIENT
Start: 2021-01-01 | End: 2021-01-01

## 2021-01-01 RX ORDER — DEXAMETHASONE SODIUM PHOSPHATE 4 MG/ML
6 INJECTION, SOLUTION INTRA-ARTICULAR; INTRALESIONAL; INTRAMUSCULAR; INTRAVENOUS; SOFT TISSUE
Status: COMPLETED | OUTPATIENT
Start: 2021-01-01 | End: 2021-01-01

## 2021-01-01 RX ORDER — LORAZEPAM 2 MG/ML
INJECTION INTRAMUSCULAR
Status: COMPLETED
Start: 2021-01-01 | End: 2021-01-01

## 2021-01-01 RX ORDER — PROPOFOL 10 MG/ML
INJECTION, EMULSION INTRAVENOUS
Status: COMPLETED
Start: 2021-01-01 | End: 2021-01-01

## 2021-01-01 RX ORDER — PROPOFOL 10 MG/ML
0-50 VIAL (ML) INTRAVENOUS
Status: DISCONTINUED | OUTPATIENT
Start: 2021-01-01 | End: 2021-01-01

## 2021-01-01 RX ORDER — LIDOCAINE 50 MG/G
1 PATCH TOPICAL
COMMUNITY

## 2021-01-01 RX ORDER — WATER FOR INJECTION,STERILE
VIAL (ML) INJECTION
Status: COMPLETED
Start: 2021-01-01 | End: 2021-01-01

## 2021-01-01 RX ORDER — POLYETHYLENE GLYCOL 3350 17 G/17G
17 POWDER, FOR SOLUTION ORAL DAILY
Status: DISCONTINUED | OUTPATIENT
Start: 2021-01-01 | End: 2021-01-01 | Stop reason: HOSPADM

## 2021-01-01 RX ORDER — MIDAZOLAM HYDROCHLORIDE 1 MG/ML
10 INJECTION, SOLUTION INTRAMUSCULAR; INTRAVENOUS ONCE
Status: COMPLETED | OUTPATIENT
Start: 2021-01-01 | End: 2021-01-01

## 2021-01-01 RX ORDER — ALBUTEROL SULFATE 90 UG/1
2 AEROSOL, METERED RESPIRATORY (INHALATION)
Status: DISCONTINUED | OUTPATIENT
Start: 2021-01-01 | End: 2021-01-01 | Stop reason: HOSPADM

## 2021-01-01 RX ORDER — DEXAMETHASONE SODIUM PHOSPHATE 100 MG/10ML
10 INJECTION INTRAMUSCULAR; INTRAVENOUS
Status: COMPLETED | OUTPATIENT
Start: 2021-01-01 | End: 2021-01-01

## 2021-01-01 RX ORDER — POLYETHYLENE GLYCOL 3350 17 G/17G
17 POWDER, FOR SOLUTION ORAL DAILY PRN
Status: DISCONTINUED | OUTPATIENT
Start: 2021-01-01 | End: 2021-01-01

## 2021-01-01 RX ORDER — ROCURONIUM BROMIDE 10 MG/ML
50 INJECTION, SOLUTION INTRAVENOUS
Status: COMPLETED | OUTPATIENT
Start: 2021-01-01 | End: 2021-01-01

## 2021-01-01 RX ORDER — FLUTICASONE PROPIONATE 110 UG/1
1 AEROSOL, METERED RESPIRATORY (INHALATION) EVERY 12 HOURS
Qty: 1 INHALER | Refills: 5 | Status: SHIPPED | OUTPATIENT
Start: 2021-01-01

## 2021-01-01 RX ORDER — FLUCONAZOLE 2 MG/ML
400 INJECTION, SOLUTION INTRAVENOUS EVERY 24 HOURS
Status: DISCONTINUED | OUTPATIENT
Start: 2021-01-01 | End: 2021-01-01 | Stop reason: HOSPADM

## 2021-01-01 RX ORDER — ONDANSETRON 2 MG/ML
4 INJECTION INTRAMUSCULAR; INTRAVENOUS
Status: DISCONTINUED | OUTPATIENT
Start: 2021-01-01 | End: 2021-01-01 | Stop reason: HOSPADM

## 2021-01-01 RX ORDER — DEXMEDETOMIDINE HYDROCHLORIDE 4 UG/ML
.1-1.5 INJECTION, SOLUTION INTRAVENOUS
Status: DISCONTINUED | OUTPATIENT
Start: 2021-01-01 | End: 2021-01-01

## 2021-01-01 RX ORDER — ONDANSETRON 4 MG/1
4 TABLET, ORALLY DISINTEGRATING ORAL
Status: DISCONTINUED | OUTPATIENT
Start: 2021-01-01 | End: 2021-01-01 | Stop reason: HOSPADM

## 2021-01-01 RX ORDER — SODIUM CHLORIDE 0.9 % (FLUSH) 0.9 %
5-40 SYRINGE (ML) INJECTION EVERY 8 HOURS
Status: DISCONTINUED | OUTPATIENT
Start: 2021-01-01 | End: 2021-01-01 | Stop reason: HOSPADM

## 2021-01-01 RX ORDER — LORAZEPAM 2 MG/ML
4 INJECTION INTRAMUSCULAR
Status: DISCONTINUED | OUTPATIENT
Start: 2021-01-01 | End: 2021-01-01 | Stop reason: HOSPADM

## 2021-01-01 RX ORDER — ENOXAPARIN SODIUM 100 MG/ML
40 INJECTION SUBCUTANEOUS EVERY 12 HOURS
Status: DISCONTINUED | OUTPATIENT
Start: 2021-01-01 | End: 2021-01-01

## 2021-01-01 RX ORDER — IBUPROFEN 400 MG/1
800 TABLET ORAL
Status: COMPLETED | OUTPATIENT
Start: 2021-01-01 | End: 2021-01-01

## 2021-01-01 RX ORDER — VECURONIUM BROMIDE FOR INJECTION 1 MG/ML
10 INJECTION, POWDER, LYOPHILIZED, FOR SOLUTION INTRAVENOUS ONCE
Status: COMPLETED | OUTPATIENT
Start: 2021-01-01 | End: 2021-01-01

## 2021-01-01 RX ORDER — ALBUTEROL SULFATE 90 UG/1
2 AEROSOL, METERED RESPIRATORY (INHALATION)
Qty: 1 INHALER | Refills: 0 | Status: SHIPPED | OUTPATIENT
Start: 2021-01-01

## 2021-01-01 RX ORDER — FAMOTIDINE 20 MG/1
20 TABLET, FILM COATED ORAL 2 TIMES DAILY
Qty: 28 TABLET | Refills: 0 | Status: SHIPPED | OUTPATIENT
Start: 2021-01-01

## 2021-01-01 RX ORDER — PROPOFOL 10 MG/ML
INJECTION, EMULSION INTRAVENOUS
Status: DISCONTINUED
Start: 2021-01-01 | End: 2021-01-01

## 2021-01-01 RX ORDER — NOREPINEPHRINE BITARTRATE/D5W 8 MG/250ML
.5-16 PLASTIC BAG, INJECTION (ML) INTRAVENOUS
Status: DISCONTINUED | OUTPATIENT
Start: 2021-01-01 | End: 2021-01-01 | Stop reason: SDUPTHER

## 2021-01-01 RX ORDER — MIDAZOLAM HYDROCHLORIDE 1 MG/ML
0-10 INJECTION, SOLUTION INTRAVENOUS
Status: DISCONTINUED | OUTPATIENT
Start: 2021-01-01 | End: 2021-01-01

## 2021-01-01 RX ORDER — ASCORBIC ACID 500 MG
1000 TABLET ORAL 2 TIMES DAILY
Status: DISCONTINUED | OUTPATIENT
Start: 2021-01-01 | End: 2021-01-01

## 2021-01-01 RX ORDER — FUROSEMIDE 10 MG/ML
INJECTION INTRAMUSCULAR; INTRAVENOUS
Status: COMPLETED
Start: 2021-01-01 | End: 2021-01-01

## 2021-01-01 RX ORDER — LORAZEPAM 2 MG/ML
0.5 INJECTION, SOLUTION INTRAMUSCULAR; INTRAVENOUS ONCE
Status: COMPLETED | OUTPATIENT
Start: 2021-01-01 | End: 2021-01-01

## 2021-01-01 RX ORDER — ENOXAPARIN SODIUM 100 MG/ML
30 INJECTION SUBCUTANEOUS EVERY 12 HOURS
Status: DISCONTINUED | OUTPATIENT
Start: 2021-01-01 | End: 2021-01-01

## 2021-01-01 RX ORDER — DIPHENHYDRAMINE HYDROCHLORIDE 50 MG/ML
50 INJECTION, SOLUTION INTRAMUSCULAR; INTRAVENOUS
Status: ACTIVE | OUTPATIENT
Start: 2021-01-01 | End: 2021-01-01

## 2021-01-01 RX ORDER — SODIUM CHLORIDE 0.9 % (FLUSH) 0.9 %
5-40 SYRINGE (ML) INJECTION AS NEEDED
Status: DISCONTINUED | OUTPATIENT
Start: 2021-01-01 | End: 2021-01-01 | Stop reason: HOSPADM

## 2021-01-01 RX ORDER — ACETAMINOPHEN 650 MG/1
650 SUPPOSITORY RECTAL
Status: DISCONTINUED | OUTPATIENT
Start: 2021-01-01 | End: 2021-01-01 | Stop reason: HOSPADM

## 2021-01-01 RX ORDER — FUROSEMIDE 10 MG/ML
40 INJECTION INTRAMUSCULAR; INTRAVENOUS ONCE
Status: COMPLETED | OUTPATIENT
Start: 2021-01-01 | End: 2021-01-01

## 2021-01-01 RX ORDER — NOREPINEPHRINE BITARTRATE/D5W 8 MG/250ML
.5-2 PLASTIC BAG, INJECTION (ML) INTRAVENOUS
Status: DISPENSED | OUTPATIENT
Start: 2021-01-01 | End: 2021-01-01

## 2021-01-01 RX ORDER — VANCOMYCIN HYDROCHLORIDE
1250 EVERY 12 HOURS
Status: DISCONTINUED | OUTPATIENT
Start: 2021-01-01 | End: 2021-01-01 | Stop reason: HOSPADM

## 2021-01-01 RX ORDER — ENOXAPARIN SODIUM 100 MG/ML
100 INJECTION SUBCUTANEOUS ONCE
Status: COMPLETED | OUTPATIENT
Start: 2021-01-01 | End: 2021-01-01

## 2021-01-01 RX ORDER — ALBUTEROL SULFATE 0.83 MG/ML
2.5 SOLUTION RESPIRATORY (INHALATION)
Qty: 30 NEBULE | Refills: 1 | Status: SHIPPED | OUTPATIENT
Start: 2021-01-01

## 2021-01-01 RX ORDER — ENOXAPARIN SODIUM 100 MG/ML
40 INJECTION SUBCUTANEOUS EVERY 12 HOURS
Status: DISCONTINUED | OUTPATIENT
Start: 2021-01-01 | End: 2021-01-01 | Stop reason: HOSPADM

## 2021-01-01 RX ORDER — SODIUM CHLORIDE, SODIUM LACTATE, POTASSIUM CHLORIDE, CALCIUM CHLORIDE 600; 310; 30; 20 MG/100ML; MG/100ML; MG/100ML; MG/100ML
150 INJECTION, SOLUTION INTRAVENOUS CONTINUOUS
Status: DISCONTINUED | OUTPATIENT
Start: 2021-01-01 | End: 2021-01-01

## 2021-01-01 RX ORDER — LIDOCAINE 50 MG/G
PATCH TOPICAL
Qty: 90 EACH | Refills: 11 | Status: ON HOLD | OUTPATIENT
Start: 2021-01-01 | End: 2021-01-01

## 2021-01-01 RX ORDER — MIDAZOLAM HYDROCHLORIDE 1 MG/ML
INJECTION, SOLUTION INTRAMUSCULAR; INTRAVENOUS
Status: COMPLETED
Start: 2021-01-01 | End: 2021-01-01

## 2021-01-01 RX ORDER — AZITHROMYCIN 250 MG/1
500 TABLET, FILM COATED ORAL DAILY
Status: DISCONTINUED | OUTPATIENT
Start: 2021-01-01 | End: 2021-01-01 | Stop reason: HOSPADM

## 2021-01-01 RX ORDER — ASCORBIC ACID 500 MG
500 TABLET ORAL 2 TIMES DAILY
Status: DISCONTINUED | OUTPATIENT
Start: 2021-01-01 | End: 2021-01-01 | Stop reason: HOSPADM

## 2021-01-01 RX ORDER — IPRATROPIUM BROMIDE AND ALBUTEROL SULFATE 2.5; .5 MG/3ML; MG/3ML
3 SOLUTION RESPIRATORY (INHALATION)
Status: DISCONTINUED | OUTPATIENT
Start: 2021-01-01 | End: 2021-01-01 | Stop reason: HOSPADM

## 2021-01-01 RX ORDER — FUROSEMIDE 10 MG/ML
60 INJECTION INTRAMUSCULAR; INTRAVENOUS EVERY 8 HOURS
Status: DISCONTINUED | OUTPATIENT
Start: 2021-01-01 | End: 2021-01-01

## 2021-01-01 RX ORDER — ROCURONIUM BROMIDE 10 MG/ML
INJECTION, SOLUTION INTRAVENOUS
Status: COMPLETED
Start: 2021-01-01 | End: 2021-01-01

## 2021-01-01 RX ORDER — MONTELUKAST SODIUM 10 MG/1
10 TABLET ORAL DAILY
Qty: 30 TABLET | Refills: 5 | Status: SHIPPED | OUTPATIENT
Start: 2021-01-01

## 2021-01-01 RX ORDER — ALBUTEROL SULFATE 0.83 MG/ML
SOLUTION RESPIRATORY (INHALATION)
Status: COMPLETED
Start: 2021-01-01 | End: 2021-01-01

## 2021-01-01 RX ORDER — MAGNESIUM SULFATE 100 %
4 CRYSTALS MISCELLANEOUS AS NEEDED
Status: DISCONTINUED | OUTPATIENT
Start: 2021-01-01 | End: 2021-01-01 | Stop reason: HOSPADM

## 2021-01-01 RX ORDER — BALSAM PERU/CASTOR OIL
OINTMENT (GRAM) TOPICAL 2 TIMES DAILY
Status: DISCONTINUED | OUTPATIENT
Start: 2021-01-01 | End: 2021-01-01 | Stop reason: HOSPADM

## 2021-01-01 RX ORDER — ALBUTEROL SULFATE 90 UG/1
8 AEROSOL, METERED RESPIRATORY (INHALATION)
Status: COMPLETED | OUTPATIENT
Start: 2021-01-01 | End: 2021-01-01

## 2021-01-01 RX ORDER — METRONIDAZOLE 500 MG/100ML
500 INJECTION, SOLUTION INTRAVENOUS EVERY 12 HOURS
Status: DISCONTINUED | OUTPATIENT
Start: 2021-01-01 | End: 2021-01-01 | Stop reason: HOSPADM

## 2021-01-01 RX ORDER — DEXAMETHASONE SODIUM PHOSPHATE 4 MG/ML
6 INJECTION, SOLUTION INTRA-ARTICULAR; INTRALESIONAL; INTRAMUSCULAR; INTRAVENOUS; SOFT TISSUE EVERY 24 HOURS
Status: DISCONTINUED | OUTPATIENT
Start: 2021-01-01 | End: 2021-01-01

## 2021-01-01 RX ORDER — ROCURONIUM BROMIDE 10 MG/ML
80 INJECTION, SOLUTION INTRAVENOUS
Status: DISCONTINUED | OUTPATIENT
Start: 2021-01-01 | End: 2021-01-01 | Stop reason: HOSPADM

## 2021-01-01 RX ORDER — ACETAMINOPHEN 325 MG/1
650 TABLET ORAL
Status: DISCONTINUED | OUTPATIENT
Start: 2021-01-01 | End: 2021-01-01 | Stop reason: HOSPADM

## 2021-01-01 RX ORDER — DEXAMETHASONE SODIUM PHOSPHATE 4 MG/ML
9 INJECTION, SOLUTION INTRA-ARTICULAR; INTRALESIONAL; INTRAMUSCULAR; INTRAVENOUS; SOFT TISSUE EVERY 24 HOURS
Status: DISCONTINUED | OUTPATIENT
Start: 2021-01-01 | End: 2021-01-01 | Stop reason: HOSPADM

## 2021-01-01 RX ORDER — DEXAMETHASONE 4 MG/1
6 TABLET ORAL DAILY
Status: DISCONTINUED | OUTPATIENT
Start: 2021-01-01 | End: 2021-01-01

## 2021-01-01 RX ORDER — DEXAMETHASONE SODIUM PHOSPHATE 4 MG/ML
20 INJECTION, SOLUTION INTRA-ARTICULAR; INTRALESIONAL; INTRAMUSCULAR; INTRAVENOUS; SOFT TISSUE EVERY 24 HOURS
Status: DISCONTINUED | OUTPATIENT
Start: 2021-01-01 | End: 2021-01-01

## 2021-01-01 RX ORDER — INSULIN GLARGINE 100 [IU]/ML
8 INJECTION, SOLUTION SUBCUTANEOUS EVERY 24 HOURS
Status: DISCONTINUED | OUTPATIENT
Start: 2021-01-01 | End: 2021-01-01

## 2021-01-01 RX ORDER — LEVOFLOXACIN 5 MG/ML
750 INJECTION, SOLUTION INTRAVENOUS EVERY 24 HOURS
Status: DISCONTINUED | OUTPATIENT
Start: 2021-01-01 | End: 2021-01-01

## 2021-01-01 RX ORDER — SODIUM CHLORIDE, SODIUM LACTATE, POTASSIUM CHLORIDE, CALCIUM CHLORIDE 600; 310; 30; 20 MG/100ML; MG/100ML; MG/100ML; MG/100ML
75 INJECTION, SOLUTION INTRAVENOUS CONTINUOUS
Status: DISCONTINUED | OUTPATIENT
Start: 2021-01-01 | End: 2021-01-01

## 2021-01-01 RX ORDER — METRONIDAZOLE 7.5 MG/G
1 GEL VAGINAL
Qty: 25 G | Refills: 0 | Status: SHIPPED | OUTPATIENT
Start: 2021-01-01 | End: 2021-01-01

## 2021-01-01 RX ORDER — SODIUM CHLORIDE 0.9 % (FLUSH) 0.9 %
5-10 SYRINGE (ML) INJECTION AS NEEDED
Status: DISCONTINUED | OUTPATIENT
Start: 2021-01-01 | End: 2021-01-01 | Stop reason: SDUPTHER

## 2021-01-01 RX ORDER — ROCURONIUM BROMIDE 10 MG/ML
50 INJECTION, SOLUTION INTRAVENOUS
Status: DISCONTINUED | OUTPATIENT
Start: 2021-01-01 | End: 2021-01-01

## 2021-01-01 RX ORDER — ZINC SULFATE 50(220)MG
1 CAPSULE ORAL DAILY
Status: DISCONTINUED | OUTPATIENT
Start: 2021-01-01 | End: 2021-01-01 | Stop reason: HOSPADM

## 2021-01-01 RX ORDER — INSULIN LISPRO 100 [IU]/ML
INJECTION, SOLUTION INTRAVENOUS; SUBCUTANEOUS EVERY 6 HOURS
Status: DISCONTINUED | OUTPATIENT
Start: 2021-01-01 | End: 2021-01-01 | Stop reason: HOSPADM

## 2021-01-01 RX ORDER — NOREPINEPHRINE BITARTRATE/D5W 8 MG/250ML
.5-16 PLASTIC BAG, INJECTION (ML) INTRAVENOUS
Status: DISCONTINUED | OUTPATIENT
Start: 2021-01-01 | End: 2021-01-01 | Stop reason: HOSPADM

## 2021-01-01 RX ORDER — VANCOMYCIN/0.9 % SOD CHLORIDE 1.5G/250ML
1500 PLASTIC BAG, INJECTION (ML) INTRAVENOUS EVERY 12 HOURS
Status: DISCONTINUED | OUTPATIENT
Start: 2021-01-01 | End: 2021-01-01

## 2021-01-01 RX ORDER — OXYCODONE HCL 5 MG/5 ML
30 SOLUTION, ORAL ORAL EVERY 8 HOURS
Status: DISCONTINUED | OUTPATIENT
Start: 2021-01-01 | End: 2021-01-01 | Stop reason: HOSPADM

## 2021-01-01 RX ORDER — MIDAZOLAM HYDROCHLORIDE 1 MG/ML
4 INJECTION, SOLUTION INTRAMUSCULAR; INTRAVENOUS ONCE
Status: COMPLETED | OUTPATIENT
Start: 2021-01-01 | End: 2021-01-01

## 2021-01-01 RX ORDER — ROCURONIUM BROMIDE 50 MG/5 ML
100 SYRINGE (ML) INTRAVENOUS
Status: COMPLETED | OUTPATIENT
Start: 2021-01-01 | End: 2021-01-01

## 2021-01-01 RX ORDER — PROPOFOL 10 MG/ML
5 VIAL (ML) INTRAVENOUS
Status: DISCONTINUED | OUTPATIENT
Start: 2021-01-01 | End: 2021-01-01

## 2021-01-01 RX ORDER — FUROSEMIDE 10 MG/ML
INJECTION INTRAMUSCULAR; INTRAVENOUS
Status: DISCONTINUED
Start: 2021-01-01 | End: 2021-01-01 | Stop reason: HOSPADM

## 2021-01-01 RX ORDER — FUROSEMIDE 10 MG/ML
20 INJECTION INTRAMUSCULAR; INTRAVENOUS ONCE
Status: COMPLETED | OUTPATIENT
Start: 2021-01-01 | End: 2021-01-01

## 2021-01-01 RX ORDER — FUROSEMIDE 10 MG/ML
80 INJECTION INTRAMUSCULAR; INTRAVENOUS ONCE
Status: COMPLETED | OUTPATIENT
Start: 2021-01-01 | End: 2021-01-01

## 2021-01-01 RX ORDER — GUAIFENESIN/DEXTROMETHORPHAN 100-10MG/5
5 SYRUP ORAL
Status: DISCONTINUED | OUTPATIENT
Start: 2021-01-01 | End: 2021-01-01

## 2021-01-01 RX ORDER — DEXTROSE 50 % IN WATER (D50W) INTRAVENOUS SYRINGE
25-50 AS NEEDED
Status: DISCONTINUED | OUTPATIENT
Start: 2021-01-01 | End: 2021-01-01 | Stop reason: HOSPADM

## 2021-01-01 RX ORDER — LANOLIN ALCOHOL/MO/W.PET/CERES
3 CREAM (GRAM) TOPICAL
Status: DISCONTINUED | OUTPATIENT
Start: 2021-01-01 | End: 2021-01-01 | Stop reason: HOSPADM

## 2021-01-01 RX ORDER — BUMETANIDE 0.25 MG/ML
2 INJECTION INTRAMUSCULAR; INTRAVENOUS ONCE
Status: COMPLETED | OUTPATIENT
Start: 2021-01-01 | End: 2021-01-01

## 2021-01-01 RX ORDER — CLONAZEPAM 0.5 MG/1
2 TABLET ORAL 3 TIMES DAILY
Status: DISCONTINUED | OUTPATIENT
Start: 2021-01-01 | End: 2021-01-01 | Stop reason: HOSPADM

## 2021-01-01 RX ORDER — PROPOFOL 10 MG/ML
0-50 INJECTION, EMULSION INTRAVENOUS
Status: DISCONTINUED | OUTPATIENT
Start: 2021-01-01 | End: 2021-01-01 | Stop reason: HOSPADM

## 2021-01-01 RX ORDER — ALBUMIN HUMAN 50 G/1000ML
25 SOLUTION INTRAVENOUS ONCE
Status: COMPLETED | OUTPATIENT
Start: 2021-01-01 | End: 2021-01-01

## 2021-01-01 RX ORDER — CHLORHEXIDINE GLUCONATE 0.12 MG/ML
15 RINSE ORAL EVERY 12 HOURS
Status: DISCONTINUED | OUTPATIENT
Start: 2021-01-01 | End: 2021-01-01 | Stop reason: HOSPADM

## 2021-01-01 RX ORDER — BENZONATATE 100 MG/1
200 CAPSULE ORAL 3 TIMES DAILY
Status: DISCONTINUED | OUTPATIENT
Start: 2021-01-01 | End: 2021-01-01 | Stop reason: HOSPADM

## 2021-01-01 RX ORDER — ENOXAPARIN SODIUM 150 MG/ML
150 INJECTION SUBCUTANEOUS EVERY 12 HOURS
Status: DISCONTINUED | OUTPATIENT
Start: 2021-01-01 | End: 2021-01-01

## 2021-01-01 RX ORDER — FENTANYL CITRATE 50 UG/ML
100 INJECTION, SOLUTION INTRAMUSCULAR; INTRAVENOUS
Status: COMPLETED | OUTPATIENT
Start: 2021-01-01 | End: 2021-01-01

## 2021-01-01 RX ORDER — ACETAMINOPHEN 325 MG/1
650 TABLET ORAL
Status: DISCONTINUED | OUTPATIENT
Start: 2021-01-01 | End: 2021-01-01

## 2021-01-01 RX ORDER — LORAZEPAM 2 MG/ML
0.5 INJECTION INTRAMUSCULAR ONCE
Status: COMPLETED | OUTPATIENT
Start: 2021-01-01 | End: 2021-01-01

## 2021-01-01 RX ORDER — CODEINE PHOSPHATE AND GUAIFENESIN 10; 100 MG/5ML; MG/5ML
10 SOLUTION ORAL
Status: DISCONTINUED | OUTPATIENT
Start: 2021-01-01 | End: 2021-01-01 | Stop reason: HOSPADM

## 2021-01-01 RX ORDER — FUROSEMIDE 10 MG/ML
40 INJECTION INTRAMUSCULAR; INTRAVENOUS 2 TIMES DAILY
Status: DISCONTINUED | OUTPATIENT
Start: 2021-01-01 | End: 2021-01-01

## 2021-01-01 RX ORDER — FUROSEMIDE 10 MG/ML
60 INJECTION INTRAMUSCULAR; INTRAVENOUS EVERY 12 HOURS
Status: DISCONTINUED | OUTPATIENT
Start: 2021-01-01 | End: 2021-01-01

## 2021-01-01 RX ORDER — DEXAMETHASONE 4 MG/1
6 TABLET ORAL DAILY
Status: DISCONTINUED | OUTPATIENT
Start: 2021-01-01 | End: 2021-01-01 | Stop reason: HOSPADM

## 2021-01-01 RX ORDER — FUROSEMIDE 10 MG/ML
40 INJECTION INTRAMUSCULAR; INTRAVENOUS ONCE
Status: DISCONTINUED | OUTPATIENT
Start: 2021-01-01 | End: 2021-01-01 | Stop reason: HOSPADM

## 2021-01-01 RX ORDER — FAMOTIDINE 20 MG/1
20 TABLET, FILM COATED ORAL 2 TIMES DAILY
Status: DISCONTINUED | OUTPATIENT
Start: 2021-01-01 | End: 2021-01-01 | Stop reason: HOSPADM

## 2021-01-01 RX ORDER — INSULIN GLARGINE 100 [IU]/ML
15 INJECTION, SOLUTION SUBCUTANEOUS EVERY 24 HOURS
Status: DISCONTINUED | OUTPATIENT
Start: 2021-01-01 | End: 2021-01-01 | Stop reason: HOSPADM

## 2021-01-01 RX ORDER — MELATONIN
5000 DAILY
Status: DISCONTINUED | OUTPATIENT
Start: 2021-01-01 | End: 2021-01-01 | Stop reason: HOSPADM

## 2021-01-01 RX ORDER — POLYETHYLENE GLYCOL 3350 17 G/17G
17 POWDER, FOR SOLUTION ORAL DAILY PRN
Status: DISCONTINUED | OUTPATIENT
Start: 2021-01-01 | End: 2021-01-01 | Stop reason: HOSPADM

## 2021-01-01 RX ORDER — ALBUTEROL SULFATE 0.83 MG/ML
2.5 SOLUTION RESPIRATORY (INHALATION)
Status: DISCONTINUED | OUTPATIENT
Start: 2021-01-01 | End: 2021-01-01

## 2021-01-01 RX ORDER — DEXAMETHASONE SODIUM PHOSPHATE 4 MG/ML
3 INJECTION, SOLUTION INTRA-ARTICULAR; INTRALESIONAL; INTRAMUSCULAR; INTRAVENOUS; SOFT TISSUE ONCE
Status: COMPLETED | OUTPATIENT
Start: 2021-01-01 | End: 2021-01-01

## 2021-01-01 RX ORDER — LORAZEPAM 2 MG/ML
2 INJECTION INTRAMUSCULAR ONCE
Status: DISCONTINUED | OUTPATIENT
Start: 2021-01-01 | End: 2021-01-01

## 2021-01-01 RX ORDER — DEXMEDETOMIDINE HYDROCHLORIDE 4 UG/ML
.1-1.5 INJECTION, SOLUTION INTRAVENOUS
Status: DISCONTINUED | OUTPATIENT
Start: 2021-01-01 | End: 2021-01-01 | Stop reason: HOSPADM

## 2021-01-01 RX ORDER — FUROSEMIDE 10 MG/ML
20 INJECTION INTRAMUSCULAR; INTRAVENOUS EVERY 12 HOURS
Status: COMPLETED | OUTPATIENT
Start: 2021-01-01 | End: 2021-01-01

## 2021-01-01 RX ORDER — AMOXICILLIN 250 MG
1 CAPSULE ORAL EVERY 12 HOURS
Status: DISCONTINUED | OUTPATIENT
Start: 2021-01-01 | End: 2021-01-01 | Stop reason: HOSPADM

## 2021-01-01 RX ORDER — BUDESONIDE 0.25 MG/2ML
250 INHALANT ORAL
Status: DISCONTINUED | OUTPATIENT
Start: 2021-01-01 | End: 2021-01-01 | Stop reason: HOSPADM

## 2021-01-01 RX ORDER — ROCURONIUM BROMIDE 10 MG/ML
INJECTION, SOLUTION INTRAVENOUS
Status: DISPENSED
Start: 2021-01-01 | End: 2021-01-01

## 2021-01-01 RX ORDER — ENOXAPARIN SODIUM 100 MG/ML
40 INJECTION SUBCUTANEOUS DAILY
Status: DISCONTINUED | OUTPATIENT
Start: 2021-01-01 | End: 2021-01-01

## 2021-01-01 RX ADMIN — CLONAZEPAM 2 MG: 0.5 TABLET ORAL at 09:52

## 2021-01-01 RX ADMIN — CLONAZEPAM 2 MG: 0.5 TABLET ORAL at 08:37

## 2021-01-01 RX ADMIN — Medication 10 ML: at 21:53

## 2021-01-01 RX ADMIN — Medication 50 MG: at 22:36

## 2021-01-01 RX ADMIN — CASTOR OIL AND BALSAM, PERU: 788; 87 OINTMENT TOPICAL at 18:10

## 2021-01-01 RX ADMIN — VECURONIUM BROMIDE 10 MG: 1 INJECTION, POWDER, LYOPHILIZED, FOR SOLUTION INTRAVENOUS at 16:57

## 2021-01-01 RX ADMIN — Medication 10 ML: at 13:03

## 2021-01-01 RX ADMIN — CISATRACURIUM BESYLATE 2.5 MCG/KG/MIN: 10 INJECTION INTRAVENOUS at 10:20

## 2021-01-01 RX ADMIN — DEXMEDETOMIDINE HYDROCHLORIDE 1.3 MCG/KG/HR: 100 INJECTION, SOLUTION, CONCENTRATE INTRAVENOUS at 03:13

## 2021-01-01 RX ADMIN — DEXAMETHASONE SODIUM PHOSPHATE 6 MG: 4 INJECTION, SOLUTION INTRAMUSCULAR; INTRAVENOUS at 08:00

## 2021-01-01 RX ADMIN — ENOXAPARIN SODIUM 40 MG: 40 INJECTION SUBCUTANEOUS at 20:51

## 2021-01-01 RX ADMIN — CASTOR OIL AND BALSAM, PERU: 788; 87 OINTMENT TOPICAL at 18:29

## 2021-01-01 RX ADMIN — DEXMEDETOMIDINE HYDROCHLORIDE 1 MCG/KG/HR: 100 INJECTION, SOLUTION, CONCENTRATE INTRAVENOUS at 10:49

## 2021-01-01 RX ADMIN — INSULIN LISPRO 3 UNITS: 100 INJECTION, SOLUTION INTRAVENOUS; SUBCUTANEOUS at 06:57

## 2021-01-01 RX ADMIN — Medication 10 ML: at 14:51

## 2021-01-01 RX ADMIN — CLONAZEPAM 2 MG: 0.5 TABLET ORAL at 08:07

## 2021-01-01 RX ADMIN — CEFEPIME HYDROCHLORIDE 2 G: 2 INJECTION, POWDER, FOR SOLUTION INTRAVENOUS at 15:36

## 2021-01-01 RX ADMIN — Medication 1 AMPULE: at 08:37

## 2021-01-01 RX ADMIN — Medication 10 ML: at 06:15

## 2021-01-01 RX ADMIN — Medication 300 MCG/HR: at 16:31

## 2021-01-01 RX ADMIN — FUROSEMIDE 40 MG: 10 INJECTION, SOLUTION INTRAVENOUS at 02:33

## 2021-01-01 RX ADMIN — Medication 10 ML: at 05:21

## 2021-01-01 RX ADMIN — CLONAZEPAM 2 MG: 0.5 TABLET ORAL at 16:47

## 2021-01-01 RX ADMIN — ENOXAPARIN SODIUM 40 MG: 40 INJECTION SUBCUTANEOUS at 08:51

## 2021-01-01 RX ADMIN — CLONAZEPAM 2 MG: 0.5 TABLET ORAL at 15:30

## 2021-01-01 RX ADMIN — OXYCODONE HYDROCHLORIDE 30 MG: 5 SOLUTION ORAL at 06:38

## 2021-01-01 RX ADMIN — CHOLECALCIFEROL TAB 25 MCG (1000 UNIT) 5000 UNITS: 25 TAB at 10:04

## 2021-01-01 RX ADMIN — MIDAZOLAM 15 MG/HR: 5 INJECTION INTRAMUSCULAR; INTRAVENOUS at 23:03

## 2021-01-01 RX ADMIN — Medication 3 MCG/MIN: at 05:14

## 2021-01-01 RX ADMIN — Medication 250 MCG/HR: at 16:34

## 2021-01-01 RX ADMIN — Medication 10 ML: at 10:05

## 2021-01-01 RX ADMIN — Medication 250 MCG/HR: at 23:27

## 2021-01-01 RX ADMIN — ROCURONIUM BROMIDE 50 MG: 50 INJECTION, SOLUTION INTRAVENOUS at 08:12

## 2021-01-01 RX ADMIN — Medication 10 ML: at 00:10

## 2021-01-01 RX ADMIN — BUDESONIDE 250 MCG: 0.25 INHALANT RESPIRATORY (INHALATION) at 20:43

## 2021-01-01 RX ADMIN — METRONIDAZOLE 500 MG: 500 INJECTION, SOLUTION INTRAVENOUS at 15:36

## 2021-01-01 RX ADMIN — Medication 10 ML: at 13:06

## 2021-01-01 RX ADMIN — DOCUSATE SODIUM 50MG AND SENNOSIDES 8.6MG 1 TABLET: 8.6; 5 TABLET, FILM COATED ORAL at 08:32

## 2021-01-01 RX ADMIN — MIDAZOLAM 15 MG/HR: 5 INJECTION INTRAMUSCULAR; INTRAVENOUS at 06:01

## 2021-01-01 RX ADMIN — EPOPROSTENOL 30 NG/KG/MIN: 1.5 INJECTION, POWDER, LYOPHILIZED, FOR SOLUTION INTRAVENOUS at 16:22

## 2021-01-01 RX ADMIN — Medication: at 21:51

## 2021-01-01 RX ADMIN — DEXMEDETOMIDINE HYDROCHLORIDE 1.2 MCG/KG/HR: 100 INJECTION, SOLUTION, CONCENTRATE INTRAVENOUS at 09:38

## 2021-01-01 RX ADMIN — Medication 300 MCG/HR: at 08:51

## 2021-01-01 RX ADMIN — CASTOR OIL AND BALSAM, PERU: 788; 87 OINTMENT TOPICAL at 22:54

## 2021-01-01 RX ADMIN — CHLORHEXIDINE GLUCONATE 15 ML: 0.12 RINSE ORAL at 22:11

## 2021-01-01 RX ADMIN — MIDAZOLAM 15 MG/HR: 5 INJECTION INTRAMUSCULAR; INTRAVENOUS at 02:49

## 2021-01-01 RX ADMIN — INSULIN LISPRO 3 UNITS: 100 INJECTION, SOLUTION INTRAVENOUS; SUBCUTANEOUS at 11:38

## 2021-01-01 RX ADMIN — INSULIN LISPRO 2 UNITS: 100 INJECTION, SOLUTION INTRAVENOUS; SUBCUTANEOUS at 17:36

## 2021-01-01 RX ADMIN — CISATRACURIUM BESYLATE 3 MCG/KG/MIN: 10 INJECTION INTRAVENOUS at 20:19

## 2021-01-01 RX ADMIN — KETAMINE HYDROCHLORIDE 0.2 MG/KG/HR: 50 INJECTION, SOLUTION INTRAMUSCULAR; INTRAVENOUS at 03:08

## 2021-01-01 RX ADMIN — VANCOMYCIN HYDROCHLORIDE 1500 MG: 10 INJECTION, POWDER, LYOPHILIZED, FOR SOLUTION INTRAVENOUS at 21:52

## 2021-01-01 RX ADMIN — INSULIN LISPRO 2 UNITS: 100 INJECTION, SOLUTION INTRAVENOUS; SUBCUTANEOUS at 18:18

## 2021-01-01 RX ADMIN — MIDAZOLAM 15 MG/HR: 5 INJECTION INTRAMUSCULAR; INTRAVENOUS at 08:19

## 2021-01-01 RX ADMIN — FUROSEMIDE 40 MG: 10 INJECTION, SOLUTION INTRAMUSCULAR; INTRAVENOUS at 09:13

## 2021-01-01 RX ADMIN — MINERAL OIL, PETROLATUM: 425; 568 OINTMENT OPHTHALMIC at 09:17

## 2021-01-01 RX ADMIN — Medication: at 08:10

## 2021-01-01 RX ADMIN — EPOPROSTENOL 30 NG/KG/MIN: 1.5 INJECTION, POWDER, LYOPHILIZED, FOR SOLUTION INTRAVENOUS at 22:00

## 2021-01-01 RX ADMIN — Medication: at 21:09

## 2021-01-01 RX ADMIN — LORAZEPAM 4 MG: 2 INJECTION INTRAMUSCULAR; INTRAVENOUS at 20:11

## 2021-01-01 RX ADMIN — Medication 10 ML: at 05:50

## 2021-01-01 RX ADMIN — INSULIN LISPRO 3 UNITS: 100 INJECTION, SOLUTION INTRAVENOUS; SUBCUTANEOUS at 12:11

## 2021-01-01 RX ADMIN — POLYETHYLENE GLYCOL 3350 17 G: 17 POWDER, FOR SOLUTION ORAL at 08:45

## 2021-01-01 RX ADMIN — SODIUM CHLORIDE 40 MG: 9 INJECTION, SOLUTION INTRAMUSCULAR; INTRAVENOUS; SUBCUTANEOUS at 08:38

## 2021-01-01 RX ADMIN — INSULIN LISPRO 5 UNITS: 100 INJECTION, SOLUTION INTRAVENOUS; SUBCUTANEOUS at 17:34

## 2021-01-01 RX ADMIN — Medication 1 AMPULE: at 20:20

## 2021-01-01 RX ADMIN — Medication 300 MCG/HR: at 19:46

## 2021-01-01 RX ADMIN — PROPOFOL 30 MCG/KG/MIN: 10 INJECTION, EMULSION INTRAVENOUS at 01:56

## 2021-01-01 RX ADMIN — VANCOMYCIN HYDROCHLORIDE 1250 MG: 10 INJECTION, POWDER, LYOPHILIZED, FOR SOLUTION INTRAVENOUS at 09:57

## 2021-01-01 RX ADMIN — SODIUM CHLORIDE 40 MG: 9 INJECTION, SOLUTION INTRAMUSCULAR; INTRAVENOUS; SUBCUTANEOUS at 08:00

## 2021-01-01 RX ADMIN — CEFEPIME HYDROCHLORIDE 2 G: 2 INJECTION, POWDER, FOR SOLUTION INTRAVENOUS at 15:10

## 2021-01-01 RX ADMIN — OXYCODONE HYDROCHLORIDE 30 MG: 5 SOLUTION ORAL at 16:49

## 2021-01-01 RX ADMIN — ACETAMINOPHEN ORAL SOLUTION 650 MG: 650 SOLUTION ORAL at 08:52

## 2021-01-01 RX ADMIN — LORAZEPAM 4 MG: 2 INJECTION INTRAMUSCULAR; INTRAVENOUS at 21:31

## 2021-01-01 RX ADMIN — CEFEPIME HYDROCHLORIDE 2 G: 2 INJECTION, POWDER, FOR SOLUTION INTRAVENOUS at 08:38

## 2021-01-01 RX ADMIN — INSULIN LISPRO 2 UNITS: 100 INJECTION, SOLUTION INTRAVENOUS; SUBCUTANEOUS at 17:45

## 2021-01-01 RX ADMIN — Medication 10 ML: at 05:06

## 2021-01-01 RX ADMIN — Medication: at 23:16

## 2021-01-01 RX ADMIN — Medication 40 ML: at 06:02

## 2021-01-01 RX ADMIN — EPOPROSTENOL 30 NG/KG/MIN: 1.5 INJECTION, POWDER, LYOPHILIZED, FOR SOLUTION INTRAVENOUS at 16:38

## 2021-01-01 RX ADMIN — CISATRACURIUM BESYLATE 3 MCG/KG/MIN: 10 INJECTION INTRAVENOUS at 21:24

## 2021-01-01 RX ADMIN — MIDAZOLAM 15 MG/HR: 5 INJECTION INTRAMUSCULAR; INTRAVENOUS at 19:49

## 2021-01-01 RX ADMIN — SODIUM CHLORIDE, SODIUM LACTATE, POTASSIUM CHLORIDE, AND CALCIUM CHLORIDE 150 ML/HR: 600; 310; 30; 20 INJECTION, SOLUTION INTRAVENOUS at 12:04

## 2021-01-01 RX ADMIN — BUDESONIDE 250 MCG: 0.25 INHALANT RESPIRATORY (INHALATION) at 20:21

## 2021-01-01 RX ADMIN — INSULIN LISPRO 2 UNITS: 100 INJECTION, SOLUTION INTRAVENOUS; SUBCUTANEOUS at 00:36

## 2021-01-01 RX ADMIN — POLYETHYLENE GLYCOL 3350 17 G: 17 POWDER, FOR SOLUTION ORAL at 11:28

## 2021-01-01 RX ADMIN — DEXMEDETOMIDINE HYDROCHLORIDE 1.2 MCG/KG/HR: 100 INJECTION, SOLUTION, CONCENTRATE INTRAVENOUS at 17:42

## 2021-01-01 RX ADMIN — ENOXAPARIN SODIUM 40 MG: 40 INJECTION SUBCUTANEOUS at 12:39

## 2021-01-01 RX ADMIN — CLONAZEPAM 2 MG: 0.5 TABLET ORAL at 08:30

## 2021-01-01 RX ADMIN — CHLORHEXIDINE GLUCONATE 15 ML: 0.12 RINSE ORAL at 20:06

## 2021-01-01 RX ADMIN — Medication 10 ML: at 14:30

## 2021-01-01 RX ADMIN — BUDESONIDE 250 MCG: 0.25 INHALANT RESPIRATORY (INHALATION) at 19:44

## 2021-01-01 RX ADMIN — Medication 1 AMPULE: at 08:35

## 2021-01-01 RX ADMIN — Medication 10 ML: at 16:50

## 2021-01-01 RX ADMIN — ALBUTEROL SULFATE 2 PUFF: 90 AEROSOL, METERED RESPIRATORY (INHALATION) at 20:44

## 2021-01-01 RX ADMIN — PROPOFOL 20 MCG/KG/MIN: 10 INJECTION, EMULSION INTRAVENOUS at 02:20

## 2021-01-01 RX ADMIN — CLONAZEPAM 2 MG: 0.5 TABLET ORAL at 16:05

## 2021-01-01 RX ADMIN — PROPOFOL 30 MCG/KG/MIN: 10 INJECTION, EMULSION INTRAVENOUS at 12:04

## 2021-01-01 RX ADMIN — Medication 250 MCG/HR: at 05:41

## 2021-01-01 RX ADMIN — FUROSEMIDE 20 MG: 10 INJECTION, SOLUTION INTRAMUSCULAR; INTRAVENOUS at 15:04

## 2021-01-01 RX ADMIN — INSULIN LISPRO 5 UNITS: 100 INJECTION, SOLUTION INTRAVENOUS; SUBCUTANEOUS at 23:50

## 2021-01-01 RX ADMIN — Medication 250 MCG/HR: at 04:31

## 2021-01-01 RX ADMIN — ACETAMINOPHEN 650 MG: 325 TABLET ORAL at 13:16

## 2021-01-01 RX ADMIN — Medication 300 MCG/HR: at 13:17

## 2021-01-01 RX ADMIN — CHLORHEXIDINE GLUCONATE 15 ML: 0.12 RINSE ORAL at 09:15

## 2021-01-01 RX ADMIN — CASTOR OIL AND BALSAM, PERU: 788; 87 OINTMENT TOPICAL at 18:21

## 2021-01-01 RX ADMIN — INSULIN LISPRO 2 UNITS: 100 INJECTION, SOLUTION INTRAVENOUS; SUBCUTANEOUS at 11:20

## 2021-01-01 RX ADMIN — ACETAMINOPHEN 650 MG: 650 SUPPOSITORY RECTAL at 05:21

## 2021-01-01 RX ADMIN — OXYCODONE HYDROCHLORIDE 30 MG: 5 SOLUTION ORAL at 13:24

## 2021-01-01 RX ADMIN — CISATRACURIUM BESYLATE 3 MCG/KG/MIN: 10 INJECTION, SOLUTION INTRAVENOUS at 23:45

## 2021-01-01 RX ADMIN — DEXAMETHASONE SODIUM PHOSPHATE 20 MG: 4 INJECTION, SOLUTION INTRAMUSCULAR; INTRAVENOUS at 10:02

## 2021-01-01 RX ADMIN — Medication 300 MCG/HR: at 02:28

## 2021-01-01 RX ADMIN — MIDAZOLAM 15 MG/HR: 5 INJECTION INTRAMUSCULAR; INTRAVENOUS at 09:41

## 2021-01-01 RX ADMIN — Medication 10 ML: at 15:30

## 2021-01-01 RX ADMIN — OXYCODONE HYDROCHLORIDE 30 MG: 5 SOLUTION ORAL at 21:02

## 2021-01-01 RX ADMIN — CASTOR OIL AND BALSAM, PERU: 788; 87 OINTMENT TOPICAL at 08:42

## 2021-01-01 RX ADMIN — FAMOTIDINE 20 MG: 20 TABLET ORAL at 17:34

## 2021-01-01 RX ADMIN — INSULIN LISPRO 2 UNITS: 100 INJECTION, SOLUTION INTRAVENOUS; SUBCUTANEOUS at 11:09

## 2021-01-01 RX ADMIN — CEFEPIME HYDROCHLORIDE 2 G: 2 INJECTION, POWDER, FOR SOLUTION INTRAVENOUS at 15:59

## 2021-01-01 RX ADMIN — DEXMEDETOMIDINE HYDROCHLORIDE 1.2 MCG/KG/HR: 100 INJECTION, SOLUTION, CONCENTRATE INTRAVENOUS at 15:04

## 2021-01-01 RX ADMIN — CHLORHEXIDINE GLUCONATE 15 ML: 0.12 RINSE ORAL at 20:14

## 2021-01-01 RX ADMIN — LORAZEPAM 0.5 MG: 2 INJECTION INTRAMUSCULAR; INTRAVENOUS at 00:15

## 2021-01-01 RX ADMIN — OXYCODONE HYDROCHLORIDE 30 MG: 5 SOLUTION ORAL at 05:42

## 2021-01-01 RX ADMIN — DEXMEDETOMIDINE HYDROCHLORIDE 1.2 MCG/KG/HR: 100 INJECTION, SOLUTION, CONCENTRATE INTRAVENOUS at 12:39

## 2021-01-01 RX ADMIN — Medication 300 MCG/HR: at 00:40

## 2021-01-01 RX ADMIN — MIDAZOLAM 15 MG/HR: 5 INJECTION INTRAMUSCULAR; INTRAVENOUS at 11:37

## 2021-01-01 RX ADMIN — ALBUTEROL SULFATE 8 PUFF: 90 AEROSOL, METERED RESPIRATORY (INHALATION) at 00:36

## 2021-01-01 RX ADMIN — PROPOFOL 30 MCG/KG/MIN: 10 INJECTION, EMULSION INTRAVENOUS at 04:50

## 2021-01-01 RX ADMIN — DOCUSATE SODIUM 50MG AND SENNOSIDES 8.6MG 1 TABLET: 8.6; 5 TABLET, FILM COATED ORAL at 21:42

## 2021-01-01 RX ADMIN — Medication 250 MCG/HR: at 11:55

## 2021-01-01 RX ADMIN — ACETAMINOPHEN 650 MG: 650 SUPPOSITORY RECTAL at 22:32

## 2021-01-01 RX ADMIN — MIDAZOLAM 15 MG/HR: 5 INJECTION INTRAMUSCULAR; INTRAVENOUS at 09:35

## 2021-01-01 RX ADMIN — Medication 50 MG: at 02:26

## 2021-01-01 RX ADMIN — DEXMEDETOMIDINE HYDROCHLORIDE 1.5 MCG/KG/HR: 100 INJECTION, SOLUTION, CONCENTRATE INTRAVENOUS at 12:40

## 2021-01-01 RX ADMIN — MIDAZOLAM 5 MG/HR: 5 INJECTION INTRAMUSCULAR; INTRAVENOUS at 07:45

## 2021-01-01 RX ADMIN — Medication 10 ML: at 23:41

## 2021-01-01 RX ADMIN — Medication 1 AMPULE: at 08:05

## 2021-01-01 RX ADMIN — GUAIFENESIN AND CODEINE PHOSPHATE 10 ML: 100; 10 SOLUTION ORAL at 13:37

## 2021-01-01 RX ADMIN — INSULIN LISPRO 2 UNITS: 100 INJECTION, SOLUTION INTRAVENOUS; SUBCUTANEOUS at 06:35

## 2021-01-01 RX ADMIN — BUDESONIDE 250 MCG: 0.25 INHALANT RESPIRATORY (INHALATION) at 07:25

## 2021-01-01 RX ADMIN — Medication 40 ML: at 14:50

## 2021-01-01 RX ADMIN — Medication 250 MCG/HR: at 10:06

## 2021-01-01 RX ADMIN — DEXMEDETOMIDINE HYDROCHLORIDE 1.5 MCG/KG/HR: 100 INJECTION, SOLUTION, CONCENTRATE INTRAVENOUS at 21:44

## 2021-01-01 RX ADMIN — OXYCODONE HYDROCHLORIDE 30 MG: 5 SOLUTION ORAL at 05:49

## 2021-01-01 RX ADMIN — Medication 1 AMPULE: at 21:10

## 2021-01-01 RX ADMIN — DEXMEDETOMIDINE HYDROCHLORIDE 1.5 MCG/KG/HR: 100 INJECTION, SOLUTION, CONCENTRATE INTRAVENOUS at 22:15

## 2021-01-01 RX ADMIN — METRONIDAZOLE 500 MG: 500 INJECTION, SOLUTION INTRAVENOUS at 05:17

## 2021-01-01 RX ADMIN — CEFEPIME HYDROCHLORIDE 2 G: 2 INJECTION, POWDER, FOR SOLUTION INTRAVENOUS at 07:59

## 2021-01-01 RX ADMIN — CLONAZEPAM 2 MG: 0.5 TABLET ORAL at 21:08

## 2021-01-01 RX ADMIN — Medication 300 MCG/HR: at 19:42

## 2021-01-01 RX ADMIN — NOREPINEPHRINE BITARTRATE 4 MCG/MIN: 1 INJECTION, SOLUTION, CONCENTRATE INTRAVENOUS at 05:20

## 2021-01-01 RX ADMIN — ENOXAPARIN SODIUM 40 MG: 40 INJECTION SUBCUTANEOUS at 10:04

## 2021-01-01 RX ADMIN — Medication 1 AMPULE: at 08:53

## 2021-01-01 RX ADMIN — Medication 300 MCG/HR: at 23:17

## 2021-01-01 RX ADMIN — CHLORHEXIDINE GLUCONATE 15 ML: 0.12 RINSE ORAL at 21:47

## 2021-01-01 RX ADMIN — CLONAZEPAM 2 MG: 0.5 TABLET ORAL at 08:48

## 2021-01-01 RX ADMIN — CLONAZEPAM 2 MG: 0.5 TABLET ORAL at 08:36

## 2021-01-01 RX ADMIN — Medication 200 MCG/HR: at 09:32

## 2021-01-01 RX ADMIN — OXYCODONE HYDROCHLORIDE 30 MG: 5 SOLUTION ORAL at 15:36

## 2021-01-01 RX ADMIN — GUAIFENESIN AND DEXTROMETHORPHAN 5 ML: 100; 10 SYRUP ORAL at 13:16

## 2021-01-01 RX ADMIN — ACETAMINOPHEN ORAL SOLUTION 650 MG: 650 SOLUTION ORAL at 04:05

## 2021-01-01 RX ADMIN — CEFEPIME HYDROCHLORIDE 2 G: 2 INJECTION, POWDER, FOR SOLUTION INTRAVENOUS at 00:37

## 2021-01-01 RX ADMIN — Medication 10 ML: at 21:29

## 2021-01-01 RX ADMIN — Medication 10 ML: at 06:19

## 2021-01-01 RX ADMIN — CLONAZEPAM 2 MG: 0.5 TABLET ORAL at 21:23

## 2021-01-01 RX ADMIN — Medication: at 20:35

## 2021-01-01 RX ADMIN — INSULIN LISPRO 2 UNITS: 100 INJECTION, SOLUTION INTRAVENOUS; SUBCUTANEOUS at 23:42

## 2021-01-01 RX ADMIN — CISATRACURIUM BESYLATE 3.5 MCG/KG/MIN: 10 INJECTION INTRAVENOUS at 10:48

## 2021-01-01 RX ADMIN — ROCURONIUM BROMIDE 50 MG: 50 INJECTION, SOLUTION INTRAVENOUS at 07:45

## 2021-01-01 RX ADMIN — DEXMEDETOMIDINE HYDROCHLORIDE 1.2 MCG/KG/HR: 100 INJECTION, SOLUTION, CONCENTRATE INTRAVENOUS at 22:33

## 2021-01-01 RX ADMIN — PROPOFOL 40 MCG/KG/MIN: 10 INJECTION, EMULSION INTRAVENOUS at 00:10

## 2021-01-01 RX ADMIN — INSULIN LISPRO 2 UNITS: 100 INJECTION, SOLUTION INTRAVENOUS; SUBCUTANEOUS at 11:48

## 2021-01-01 RX ADMIN — CHLORHEXIDINE GLUCONATE 15 ML: 0.12 RINSE ORAL at 20:34

## 2021-01-01 RX ADMIN — BUDESONIDE 250 MCG: 0.25 INHALANT RESPIRATORY (INHALATION) at 21:25

## 2021-01-01 RX ADMIN — PROPOFOL 50 MCG/KG/MIN: 10 INJECTION, EMULSION INTRAVENOUS at 15:08

## 2021-01-01 RX ADMIN — SODIUM CHLORIDE 0.8 MCG/KG/HR: 9 INJECTION, SOLUTION INTRAVENOUS at 04:31

## 2021-01-01 RX ADMIN — CLONAZEPAM 2 MG: 0.5 TABLET ORAL at 15:36

## 2021-01-01 RX ADMIN — DOCUSATE SODIUM 50MG AND SENNOSIDES 8.6MG 1 TABLET: 8.6; 5 TABLET, FILM COATED ORAL at 20:29

## 2021-01-01 RX ADMIN — ACETAMINOPHEN ORAL SOLUTION 650 MG: 650 SOLUTION ORAL at 21:44

## 2021-01-01 RX ADMIN — MIDAZOLAM 15 MG/HR: 5 INJECTION INTRAMUSCULAR; INTRAVENOUS at 19:51

## 2021-01-01 RX ADMIN — INSULIN LISPRO 2 UNITS: 100 INJECTION, SOLUTION INTRAVENOUS; SUBCUTANEOUS at 05:50

## 2021-01-01 RX ADMIN — Medication 10 ML: at 05:49

## 2021-01-01 RX ADMIN — METRONIDAZOLE 500 MG: 500 INJECTION, SOLUTION INTRAVENOUS at 15:11

## 2021-01-01 RX ADMIN — FUROSEMIDE 60 MG: 10 INJECTION, SOLUTION INTRAMUSCULAR; INTRAVENOUS at 17:44

## 2021-01-01 RX ADMIN — ROCURONIUM BROMIDE 50 MG: 50 INJECTION, SOLUTION INTRAVENOUS at 00:54

## 2021-01-01 RX ADMIN — FUROSEMIDE 60 MG: 10 INJECTION, SOLUTION INTRAMUSCULAR; INTRAVENOUS at 06:26

## 2021-01-01 RX ADMIN — PROPOFOL 35 MCG/KG/MIN: 10 INJECTION, EMULSION INTRAVENOUS at 10:18

## 2021-01-01 RX ADMIN — VANCOMYCIN HYDROCHLORIDE 1250 MG: 10 INJECTION, POWDER, LYOPHILIZED, FOR SOLUTION INTRAVENOUS at 20:51

## 2021-01-01 RX ADMIN — INSULIN LISPRO 2 UNITS: 100 INJECTION, SOLUTION INTRAVENOUS; SUBCUTANEOUS at 05:46

## 2021-01-01 RX ADMIN — INSULIN LISPRO 2 UNITS: 100 INJECTION, SOLUTION INTRAVENOUS; SUBCUTANEOUS at 18:43

## 2021-01-01 RX ADMIN — Medication 10 ML: at 21:31

## 2021-01-01 RX ADMIN — CHLORHEXIDINE GLUCONATE 15 ML: 0.12 RINSE ORAL at 20:09

## 2021-01-01 RX ADMIN — PROPOFOL 50 MCG/KG/MIN: 10 INJECTION, EMULSION INTRAVENOUS at 09:36

## 2021-01-01 RX ADMIN — CLONAZEPAM 2 MG: 0.5 TABLET ORAL at 22:12

## 2021-01-01 RX ADMIN — INSULIN LISPRO 2 UNITS: 100 INJECTION, SOLUTION INTRAVENOUS; SUBCUTANEOUS at 17:58

## 2021-01-01 RX ADMIN — ENOXAPARIN SODIUM 40 MG: 40 INJECTION SUBCUTANEOUS at 20:46

## 2021-01-01 RX ADMIN — Medication 10 ML: at 21:10

## 2021-01-01 RX ADMIN — DEXMEDETOMIDINE HYDROCHLORIDE 1.5 MCG/KG/HR: 100 INJECTION, SOLUTION, CONCENTRATE INTRAVENOUS at 06:53

## 2021-01-01 RX ADMIN — PROPOFOL 50 MCG/KG/MIN: 10 INJECTION, EMULSION INTRAVENOUS at 19:52

## 2021-01-01 RX ADMIN — Medication 200 MCG/HR: at 01:57

## 2021-01-01 RX ADMIN — METRONIDAZOLE 500 MG: 500 INJECTION, SOLUTION INTRAVENOUS at 16:29

## 2021-01-01 RX ADMIN — ENOXAPARIN SODIUM 40 MG: 40 INJECTION SUBCUTANEOUS at 08:46

## 2021-01-01 RX ADMIN — ALBUMIN (HUMAN) 25 G: 12.5 INJECTION, SOLUTION INTRAVENOUS at 10:06

## 2021-01-01 RX ADMIN — ENOXAPARIN SODIUM 40 MG: 40 INJECTION SUBCUTANEOUS at 23:02

## 2021-01-01 RX ADMIN — BUDESONIDE 250 MCG: 0.25 INHALANT RESPIRATORY (INHALATION) at 07:46

## 2021-01-01 RX ADMIN — CASTOR OIL AND BALSAM, PERU: 788; 87 OINTMENT TOPICAL at 17:31

## 2021-01-01 RX ADMIN — INSULIN LISPRO 2 UNITS: 100 INJECTION, SOLUTION INTRAVENOUS; SUBCUTANEOUS at 17:30

## 2021-01-01 RX ADMIN — OXYCODONE HYDROCHLORIDE 30 MG: 5 SOLUTION ORAL at 21:56

## 2021-01-01 RX ADMIN — Medication 10 ML: at 21:04

## 2021-01-01 RX ADMIN — VANCOMYCIN HYDROCHLORIDE 1500 MG: 10 INJECTION, POWDER, LYOPHILIZED, FOR SOLUTION INTRAVENOUS at 11:32

## 2021-01-01 RX ADMIN — DEXAMETHASONE SODIUM PHOSPHATE 20 MG: 4 INJECTION, SOLUTION INTRAMUSCULAR; INTRAVENOUS at 09:17

## 2021-01-01 RX ADMIN — MIDAZOLAM 2 MG/HR: 5 INJECTION INTRAMUSCULAR; INTRAVENOUS at 01:26

## 2021-01-01 RX ADMIN — SODIUM CHLORIDE, SODIUM LACTATE, POTASSIUM CHLORIDE, AND CALCIUM CHLORIDE 75 ML/HR: 600; 310; 30; 20 INJECTION, SOLUTION INTRAVENOUS at 06:11

## 2021-01-01 RX ADMIN — Medication 300 MCG/HR: at 05:48

## 2021-01-01 RX ADMIN — EPOPROSTENOL 30 NG/KG/MIN: 1.5 INJECTION, POWDER, LYOPHILIZED, FOR SOLUTION INTRAVENOUS at 06:44

## 2021-01-01 RX ADMIN — Medication: at 22:18

## 2021-01-01 RX ADMIN — BUDESONIDE 250 MCG: 0.25 INHALANT RESPIRATORY (INHALATION) at 19:43

## 2021-01-01 RX ADMIN — ROCURONIUM BROMIDE 50 MG: 50 INJECTION, SOLUTION INTRAVENOUS at 01:05

## 2021-01-01 RX ADMIN — OXYCODONE HYDROCHLORIDE 30 MG: 5 SOLUTION ORAL at 22:13

## 2021-01-01 RX ADMIN — CLONAZEPAM 2 MG: 0.5 TABLET ORAL at 11:28

## 2021-01-01 RX ADMIN — DOCUSATE SODIUM 50MG AND SENNOSIDES 8.6MG 1 TABLET: 8.6; 5 TABLET, FILM COATED ORAL at 20:19

## 2021-01-01 RX ADMIN — MIDAZOLAM HYDROCHLORIDE 10 MG: 1 INJECTION, SOLUTION INTRAMUSCULAR; INTRAVENOUS at 01:17

## 2021-01-01 RX ADMIN — DEXAMETHASONE SODIUM PHOSPHATE 15 MG: 4 INJECTION, SOLUTION INTRAMUSCULAR; INTRAVENOUS at 09:38

## 2021-01-01 RX ADMIN — INSULIN LISPRO 3 UNITS: 100 INJECTION, SOLUTION INTRAVENOUS; SUBCUTANEOUS at 06:00

## 2021-01-01 RX ADMIN — PROPOFOL 50 MCG/KG/MIN: 10 INJECTION, EMULSION INTRAVENOUS at 11:46

## 2021-01-01 RX ADMIN — ENOXAPARIN SODIUM 40 MG: 40 INJECTION SUBCUTANEOUS at 10:16

## 2021-01-01 RX ADMIN — PROPOFOL 50 MCG/KG/MIN: 10 INJECTION, EMULSION INTRAVENOUS at 17:14

## 2021-01-01 RX ADMIN — METRONIDAZOLE 500 MG: 500 INJECTION, SOLUTION INTRAVENOUS at 16:00

## 2021-01-01 RX ADMIN — INSULIN LISPRO 2 UNITS: 100 INJECTION, SOLUTION INTRAVENOUS; SUBCUTANEOUS at 06:38

## 2021-01-01 RX ADMIN — BUDESONIDE 250 MCG: 0.25 INHALANT RESPIRATORY (INHALATION) at 08:20

## 2021-01-01 RX ADMIN — FUROSEMIDE 60 MG: 10 INJECTION, SOLUTION INTRAMUSCULAR; INTRAVENOUS at 08:34

## 2021-01-01 RX ADMIN — BENZONATATE 200 MG: 100 CAPSULE ORAL at 19:00

## 2021-01-01 RX ADMIN — ROCURONIUM BROMIDE 50 MG: 50 INJECTION, SOLUTION INTRAVENOUS at 21:08

## 2021-01-01 RX ADMIN — ROCURONIUM BROMIDE 50 MG: 50 INJECTION, SOLUTION INTRAVENOUS at 08:43

## 2021-01-01 RX ADMIN — ENOXAPARIN SODIUM 40 MG: 40 INJECTION SUBCUTANEOUS at 22:12

## 2021-01-01 RX ADMIN — CLONAZEPAM 2 MG: 0.5 TABLET ORAL at 15:13

## 2021-01-01 RX ADMIN — Medication 1 AMPULE: at 08:25

## 2021-01-01 RX ADMIN — Medication 200 MCG/HR: at 08:00

## 2021-01-01 RX ADMIN — Medication 50 MG: at 01:21

## 2021-01-01 RX ADMIN — PROPOFOL 30 MCG/KG/MIN: 10 INJECTION, EMULSION INTRAVENOUS at 15:41

## 2021-01-01 RX ADMIN — SODIUM CHLORIDE, SODIUM LACTATE, POTASSIUM CHLORIDE, AND CALCIUM CHLORIDE 100 ML/HR: 600; 310; 30; 20 INJECTION, SOLUTION INTRAVENOUS at 18:17

## 2021-01-01 RX ADMIN — CLONAZEPAM 2 MG: 0.5 TABLET ORAL at 08:39

## 2021-01-01 RX ADMIN — DOCUSATE SODIUM 50MG AND SENNOSIDES 8.6MG 1 TABLET: 8.6; 5 TABLET, FILM COATED ORAL at 21:50

## 2021-01-01 RX ADMIN — PROPOFOL 50 MCG/KG/MIN: 10 INJECTION, EMULSION INTRAVENOUS at 15:04

## 2021-01-01 RX ADMIN — Medication 10 ML: at 22:00

## 2021-01-01 RX ADMIN — ROCURONIUM BROMIDE 50 MG: 50 INJECTION INTRAVENOUS at 05:11

## 2021-01-01 RX ADMIN — DEXMEDETOMIDINE HYDROCHLORIDE 0.5 MCG/KG/HR: 100 INJECTION, SOLUTION, CONCENTRATE INTRAVENOUS at 23:33

## 2021-01-01 RX ADMIN — DOCUSATE SODIUM 50MG AND SENNOSIDES 8.6MG 1 TABLET: 8.6; 5 TABLET, FILM COATED ORAL at 08:46

## 2021-01-01 RX ADMIN — Medication 200 MCG/HR: at 10:29

## 2021-01-01 RX ADMIN — SODIUM CHLORIDE, SODIUM LACTATE, POTASSIUM CHLORIDE, AND CALCIUM CHLORIDE 150 ML/HR: 600; 310; 30; 20 INJECTION, SOLUTION INTRAVENOUS at 20:13

## 2021-01-01 RX ADMIN — Medication 200 MCG/HR: at 12:35

## 2021-01-01 RX ADMIN — CHLORHEXIDINE GLUCONATE 15 ML: 0.12 RINSE ORAL at 08:46

## 2021-01-01 RX ADMIN — ROCURONIUM BROMIDE 50 MG: 50 INJECTION, SOLUTION INTRAVENOUS at 16:05

## 2021-01-01 RX ADMIN — FAMOTIDINE 20 MG: 20 TABLET ORAL at 10:04

## 2021-01-01 RX ADMIN — DOCUSATE SODIUM 50MG AND SENNOSIDES 8.6MG 1 TABLET: 8.6; 5 TABLET, FILM COATED ORAL at 20:08

## 2021-01-01 RX ADMIN — DEXMEDETOMIDINE HYDROCHLORIDE 1.2 MCG/KG/HR: 100 INJECTION, SOLUTION, CONCENTRATE INTRAVENOUS at 10:30

## 2021-01-01 RX ADMIN — INSULIN LISPRO 3 UNITS: 100 INJECTION, SOLUTION INTRAVENOUS; SUBCUTANEOUS at 17:35

## 2021-01-01 RX ADMIN — Medication 300 MCG/HR: at 22:38

## 2021-01-01 RX ADMIN — PROPOFOL 50 MCG/KG/MIN: 10 INJECTION, EMULSION INTRAVENOUS at 01:56

## 2021-01-01 RX ADMIN — ROCURONIUM BROMIDE 50 MG: 50 INJECTION, SOLUTION INTRAVENOUS at 20:29

## 2021-01-01 RX ADMIN — FUROSEMIDE 20 MG: 10 INJECTION, SOLUTION INTRAMUSCULAR; INTRAVENOUS at 20:51

## 2021-01-01 RX ADMIN — CHLORHEXIDINE GLUCONATE 15 ML: 0.12 RINSE ORAL at 08:40

## 2021-01-01 RX ADMIN — Medication 10 ML: at 13:18

## 2021-01-01 RX ADMIN — ROCURONIUM BROMIDE 50 MG: 50 INJECTION, SOLUTION INTRAVENOUS at 10:00

## 2021-01-01 RX ADMIN — SODIUM CHLORIDE, SODIUM LACTATE, POTASSIUM CHLORIDE, AND CALCIUM CHLORIDE 150 ML/HR: 600; 310; 30; 20 INJECTION, SOLUTION INTRAVENOUS at 02:45

## 2021-01-01 RX ADMIN — BUDESONIDE 250 MCG: 0.25 INHALANT RESPIRATORY (INHALATION) at 19:53

## 2021-01-01 RX ADMIN — Medication 10 ML: at 08:55

## 2021-01-01 RX ADMIN — CHLORHEXIDINE GLUCONATE 15 ML: 0.12 RINSE ORAL at 21:37

## 2021-01-01 RX ADMIN — BUDESONIDE 250 MCG: 0.25 INHALANT RESPIRATORY (INHALATION) at 08:52

## 2021-01-01 RX ADMIN — ROCURONIUM BROMIDE 50 MG: 50 INJECTION, SOLUTION INTRAVENOUS at 14:27

## 2021-01-01 RX ADMIN — Medication: at 08:40

## 2021-01-01 RX ADMIN — Medication 10 ML: at 13:56

## 2021-01-01 RX ADMIN — Medication 300 MCG/HR: at 09:49

## 2021-01-01 RX ADMIN — Medication 10 ML: at 14:31

## 2021-01-01 RX ADMIN — ENOXAPARIN SODIUM 40 MG: 40 INJECTION SUBCUTANEOUS at 11:24

## 2021-01-01 RX ADMIN — DEXMEDETOMIDINE HYDROCHLORIDE 1.2 MCG/KG/HR: 100 INJECTION, SOLUTION, CONCENTRATE INTRAVENOUS at 16:29

## 2021-01-01 RX ADMIN — ENOXAPARIN SODIUM 150 MG: 150 INJECTION SUBCUTANEOUS at 08:22

## 2021-01-01 RX ADMIN — Medication 10 ML: at 21:15

## 2021-01-01 RX ADMIN — MIDAZOLAM 15 MG/HR: 5 INJECTION INTRAMUSCULAR; INTRAVENOUS at 16:25

## 2021-01-01 RX ADMIN — MIDAZOLAM 15 MG/HR: 5 INJECTION INTRAMUSCULAR; INTRAVENOUS at 16:14

## 2021-01-01 RX ADMIN — ALBUTEROL SULFATE 2.5 MG: 2.5 SOLUTION RESPIRATORY (INHALATION) at 03:29

## 2021-01-01 RX ADMIN — Medication 10 ML: at 14:12

## 2021-01-01 RX ADMIN — SODIUM CHLORIDE 40 MG: 9 INJECTION, SOLUTION INTRAMUSCULAR; INTRAVENOUS; SUBCUTANEOUS at 08:45

## 2021-01-01 RX ADMIN — DOCUSATE SODIUM 50MG AND SENNOSIDES 8.6MG 1 TABLET: 8.6; 5 TABLET, FILM COATED ORAL at 22:01

## 2021-01-01 RX ADMIN — DOCUSATE SODIUM 50MG AND SENNOSIDES 8.6MG 1 TABLET: 8.6; 5 TABLET, FILM COATED ORAL at 08:48

## 2021-01-01 RX ADMIN — OXYCODONE HYDROCHLORIDE 30 MG: 5 SOLUTION ORAL at 21:48

## 2021-01-01 RX ADMIN — VANCOMYCIN HYDROCHLORIDE 1500 MG: 10 INJECTION, POWDER, LYOPHILIZED, FOR SOLUTION INTRAVENOUS at 21:19

## 2021-01-01 RX ADMIN — CISATRACURIUM BESYLATE 3.5 MCG/KG/MIN: 10 INJECTION INTRAVENOUS at 14:13

## 2021-01-01 RX ADMIN — INSULIN LISPRO 3 UNITS: 100 INJECTION, SOLUTION INTRAVENOUS; SUBCUTANEOUS at 01:00

## 2021-01-01 RX ADMIN — Medication 10 ML: at 13:35

## 2021-01-01 RX ADMIN — ENOXAPARIN SODIUM 40 MG: 40 INJECTION SUBCUTANEOUS at 20:13

## 2021-01-01 RX ADMIN — Medication 10 ML: at 14:40

## 2021-01-01 RX ADMIN — GUAIFENESIN AND CODEINE PHOSPHATE 10 ML: 100; 10 SOLUTION ORAL at 23:01

## 2021-01-01 RX ADMIN — Medication 1 AMPULE: at 20:05

## 2021-01-01 RX ADMIN — CLONAZEPAM 2 MG: 0.5 TABLET ORAL at 15:09

## 2021-01-01 RX ADMIN — Medication 300 MCG/HR: at 06:42

## 2021-01-01 RX ADMIN — BUDESONIDE 250 MCG: 0.25 INHALANT RESPIRATORY (INHALATION) at 08:09

## 2021-01-01 RX ADMIN — Medication 10 ML: at 07:04

## 2021-01-01 RX ADMIN — ENOXAPARIN SODIUM 150 MG: 150 INJECTION SUBCUTANEOUS at 22:40

## 2021-01-01 RX ADMIN — CHLORHEXIDINE GLUCONATE 15 ML: 0.12 RINSE ORAL at 08:30

## 2021-01-01 RX ADMIN — INSULIN LISPRO 2 UNITS: 100 INJECTION, SOLUTION INTRAVENOUS; SUBCUTANEOUS at 12:58

## 2021-01-01 RX ADMIN — PROPOFOL 40 MCG/KG/MIN: 10 INJECTION, EMULSION INTRAVENOUS at 14:06

## 2021-01-01 RX ADMIN — OXYCODONE HYDROCHLORIDE AND ACETAMINOPHEN 500 MG: 500 TABLET ORAL at 17:34

## 2021-01-01 RX ADMIN — Medication 10 ML: at 21:56

## 2021-01-01 RX ADMIN — INSULIN LISPRO 2 UNITS: 100 INJECTION, SOLUTION INTRAVENOUS; SUBCUTANEOUS at 23:45

## 2021-01-01 RX ADMIN — CHLORHEXIDINE GLUCONATE 15 ML: 0.12 RINSE ORAL at 09:54

## 2021-01-01 RX ADMIN — CEFEPIME HYDROCHLORIDE 2 G: 2 INJECTION, POWDER, FOR SOLUTION INTRAVENOUS at 23:45

## 2021-01-01 RX ADMIN — CASTOR OIL AND BALSAM, PERU: 788; 87 OINTMENT TOPICAL at 17:34

## 2021-01-01 RX ADMIN — CLONAZEPAM 2 MG: 0.5 TABLET ORAL at 21:38

## 2021-01-01 RX ADMIN — METRONIDAZOLE 500 MG: 500 INJECTION, SOLUTION INTRAVENOUS at 04:54

## 2021-01-01 RX ADMIN — MIDAZOLAM HYDROCHLORIDE 4 MG: 1 INJECTION, SOLUTION INTRAMUSCULAR; INTRAVENOUS at 03:53

## 2021-01-01 RX ADMIN — Medication 250 MCG/HR: at 18:00

## 2021-01-01 RX ADMIN — INSULIN GLARGINE 8 UNITS: 100 INJECTION, SOLUTION SUBCUTANEOUS at 11:22

## 2021-01-01 RX ADMIN — Medication 10 ML: at 13:33

## 2021-01-01 RX ADMIN — BUDESONIDE 250 MCG: 0.25 INHALANT RESPIRATORY (INHALATION) at 20:36

## 2021-01-01 RX ADMIN — ACETAMINOPHEN ORAL SOLUTION 650 MG: 650 SOLUTION ORAL at 17:32

## 2021-01-01 RX ADMIN — Medication 10 ML: at 06:07

## 2021-01-01 RX ADMIN — OXYCODONE HYDROCHLORIDE 30 MG: 5 SOLUTION ORAL at 21:42

## 2021-01-01 RX ADMIN — BUDESONIDE 250 MCG: 0.25 INHALANT RESPIRATORY (INHALATION) at 08:34

## 2021-01-01 RX ADMIN — Medication 300 MCG/HR: at 12:15

## 2021-01-01 RX ADMIN — ACETAMINOPHEN ORAL SOLUTION 650 MG: 650 SOLUTION ORAL at 14:29

## 2021-01-01 RX ADMIN — INSULIN LISPRO 2 UNITS: 100 INJECTION, SOLUTION INTRAVENOUS; SUBCUTANEOUS at 06:02

## 2021-01-01 RX ADMIN — SODIUM CHLORIDE 40 MG: 9 INJECTION, SOLUTION INTRAMUSCULAR; INTRAVENOUS; SUBCUTANEOUS at 08:27

## 2021-01-01 RX ADMIN — KETAMINE HYDROCHLORIDE 0.2 MG/KG/HR: 50 INJECTION, SOLUTION INTRAMUSCULAR; INTRAVENOUS at 18:26

## 2021-01-01 RX ADMIN — FUROSEMIDE 80 MG: 10 INJECTION, SOLUTION INTRAMUSCULAR; INTRAVENOUS at 02:42

## 2021-01-01 RX ADMIN — Medication 10 ML: at 22:21

## 2021-01-01 RX ADMIN — CASTOR OIL AND BALSAM, PERU: 788; 87 OINTMENT TOPICAL at 18:09

## 2021-01-01 RX ADMIN — INSULIN LISPRO 2 UNITS: 100 INJECTION, SOLUTION INTRAVENOUS; SUBCUTANEOUS at 23:11

## 2021-01-01 RX ADMIN — Medication 300 MCG/HR: at 10:47

## 2021-01-01 RX ADMIN — LORAZEPAM 4 MG: 2 INJECTION INTRAMUSCULAR; INTRAVENOUS at 00:21

## 2021-01-01 RX ADMIN — ENOXAPARIN SODIUM 40 MG: 40 INJECTION SUBCUTANEOUS at 20:07

## 2021-01-01 RX ADMIN — MIDAZOLAM 15 MG/HR: 5 INJECTION INTRAMUSCULAR; INTRAVENOUS at 13:54

## 2021-01-01 RX ADMIN — KETAMINE HYDROCHLORIDE 0.2 MG/KG/HR: 50 INJECTION, SOLUTION INTRAMUSCULAR; INTRAVENOUS at 19:47

## 2021-01-01 RX ADMIN — DEXMEDETOMIDINE HYDROCHLORIDE 1.2 MCG/KG/HR: 100 INJECTION, SOLUTION, CONCENTRATE INTRAVENOUS at 15:51

## 2021-01-01 RX ADMIN — INSULIN LISPRO 2 UNITS: 100 INJECTION, SOLUTION INTRAVENOUS; SUBCUTANEOUS at 18:00

## 2021-01-01 RX ADMIN — ROCURONIUM BROMIDE 50 MG: 50 INJECTION, SOLUTION INTRAVENOUS at 03:23

## 2021-01-01 RX ADMIN — CLONAZEPAM 2 MG: 0.5 TABLET ORAL at 08:00

## 2021-01-01 RX ADMIN — PROPOFOL 45 MCG/KG/MIN: 10 INJECTION, EMULSION INTRAVENOUS at 22:08

## 2021-01-01 RX ADMIN — CLONAZEPAM 2 MG: 0.5 TABLET ORAL at 15:08

## 2021-01-01 RX ADMIN — Medication 300 MCG/HR: at 20:51

## 2021-01-01 RX ADMIN — PROPOFOL 50 MCG/KG/MIN: 10 INJECTION, EMULSION INTRAVENOUS at 04:30

## 2021-01-01 RX ADMIN — CHLORHEXIDINE GLUCONATE 15 ML: 0.12 RINSE ORAL at 22:39

## 2021-01-01 RX ADMIN — ENOXAPARIN SODIUM 40 MG: 40 INJECTION SUBCUTANEOUS at 09:13

## 2021-01-01 RX ADMIN — MIDAZOLAM 15 MG/HR: 5 INJECTION INTRAMUSCULAR; INTRAVENOUS at 08:28

## 2021-01-01 RX ADMIN — ROCURONIUM BROMIDE 100 MG: 50 INJECTION, SOLUTION INTRAVENOUS at 02:40

## 2021-01-01 RX ADMIN — KETAMINE HYDROCHLORIDE 0.2 MG/KG/HR: 50 INJECTION, SOLUTION INTRAMUSCULAR; INTRAVENOUS at 08:01

## 2021-01-01 RX ADMIN — DEXAMETHASONE SODIUM PHOSPHATE 3 MG: 4 INJECTION, SOLUTION INTRAMUSCULAR; INTRAVENOUS at 11:22

## 2021-01-01 RX ADMIN — CLONAZEPAM 2 MG: 0.5 TABLET ORAL at 08:58

## 2021-01-01 RX ADMIN — MIDAZOLAM 15 MG/HR: 5 INJECTION INTRAMUSCULAR; INTRAVENOUS at 02:18

## 2021-01-01 RX ADMIN — Medication: at 08:00

## 2021-01-01 RX ADMIN — Medication 40 ML: at 15:29

## 2021-01-01 RX ADMIN — OXYCODONE HYDROCHLORIDE 30 MG: 5 SOLUTION ORAL at 22:36

## 2021-01-01 RX ADMIN — Medication 10 ML: at 21:12

## 2021-01-01 RX ADMIN — OXYCODONE HYDROCHLORIDE 30 MG: 5 SOLUTION ORAL at 05:50

## 2021-01-01 RX ADMIN — FLUCONAZOLE 400 MG: 400 INJECTION, SOLUTION INTRAVENOUS at 12:41

## 2021-01-01 RX ADMIN — OXYCODONE HYDROCHLORIDE 30 MG: 5 SOLUTION ORAL at 14:32

## 2021-01-01 RX ADMIN — CHLORHEXIDINE GLUCONATE 15 ML: 0.12 RINSE ORAL at 20:17

## 2021-01-01 RX ADMIN — OXYCODONE HYDROCHLORIDE 30 MG: 5 SOLUTION ORAL at 05:20

## 2021-01-01 RX ADMIN — MIDAZOLAM HYDROCHLORIDE 10 MG: 1 INJECTION, SOLUTION INTRAMUSCULAR; INTRAVENOUS at 22:34

## 2021-01-01 RX ADMIN — DEXMEDETOMIDINE HYDROCHLORIDE 1.5 MCG/KG/HR: 100 INJECTION, SOLUTION, CONCENTRATE INTRAVENOUS at 02:51

## 2021-01-01 RX ADMIN — PROPOFOL 50 MCG/KG/MIN: 10 INJECTION, EMULSION INTRAVENOUS at 11:27

## 2021-01-01 RX ADMIN — SODIUM CHLORIDE 40 MG: 9 INJECTION, SOLUTION INTRAMUSCULAR; INTRAVENOUS; SUBCUTANEOUS at 09:43

## 2021-01-01 RX ADMIN — ROCURONIUM BROMIDE 50 MG: 50 INJECTION, SOLUTION INTRAVENOUS at 20:30

## 2021-01-01 RX ADMIN — MIDAZOLAM 15 MG/HR: 5 INJECTION INTRAMUSCULAR; INTRAVENOUS at 22:23

## 2021-01-01 RX ADMIN — MIDAZOLAM 15 MG/HR: 5 INJECTION INTRAMUSCULAR; INTRAVENOUS at 12:41

## 2021-01-01 RX ADMIN — METRONIDAZOLE 500 MG: 500 INJECTION, SOLUTION INTRAVENOUS at 15:12

## 2021-01-01 RX ADMIN — DEXAMETHASONE SODIUM PHOSPHATE 10 MG: 10 INJECTION INTRAMUSCULAR; INTRAVENOUS at 00:33

## 2021-01-01 RX ADMIN — DEXMEDETOMIDINE HYDROCHLORIDE 1.2 MCG/KG/HR: 100 INJECTION, SOLUTION, CONCENTRATE INTRAVENOUS at 05:38

## 2021-01-01 RX ADMIN — CLONAZEPAM 2 MG: 0.5 TABLET ORAL at 21:02

## 2021-01-01 RX ADMIN — Medication 1 AMPULE: at 08:08

## 2021-01-01 RX ADMIN — Medication 200 MCG/HR: at 05:35

## 2021-01-01 RX ADMIN — ALBUTEROL SULFATE 2 PUFF: 90 AEROSOL, METERED RESPIRATORY (INHALATION) at 10:05

## 2021-01-01 RX ADMIN — POLYETHYLENE GLYCOL 3350 17 G: 17 POWDER, FOR SOLUTION ORAL at 08:07

## 2021-01-01 RX ADMIN — DEXAMETHASONE SODIUM PHOSPHATE 9 MG: 4 INJECTION, SOLUTION INTRAMUSCULAR; INTRAVENOUS at 08:31

## 2021-01-01 RX ADMIN — FUROSEMIDE 40 MG: 10 INJECTION, SOLUTION INTRAMUSCULAR; INTRAVENOUS at 09:53

## 2021-01-01 RX ADMIN — Medication 10 ML: at 21:45

## 2021-01-01 RX ADMIN — ENOXAPARIN SODIUM 40 MG: 40 INJECTION SUBCUTANEOUS at 08:42

## 2021-01-01 RX ADMIN — FUROSEMIDE 40 MG: 10 INJECTION, SOLUTION INTRAMUSCULAR; INTRAVENOUS at 14:08

## 2021-01-01 RX ADMIN — OXYCODONE HYDROCHLORIDE 30 MG: 5 SOLUTION ORAL at 13:55

## 2021-01-01 RX ADMIN — Medication 300 MCG/HR: at 16:06

## 2021-01-01 RX ADMIN — INSULIN LISPRO 3 UNITS: 100 INJECTION, SOLUTION INTRAVENOUS; SUBCUTANEOUS at 12:12

## 2021-01-01 RX ADMIN — DEXMEDETOMIDINE HYDROCHLORIDE 1.5 MCG/KG/HR: 100 INJECTION, SOLUTION, CONCENTRATE INTRAVENOUS at 21:15

## 2021-01-01 RX ADMIN — ACETAMINOPHEN ORAL SOLUTION 650 MG: 650 SOLUTION ORAL at 18:32

## 2021-01-01 RX ADMIN — DOCUSATE SODIUM 50MG AND SENNOSIDES 8.6MG 1 TABLET: 8.6; 5 TABLET, FILM COATED ORAL at 22:12

## 2021-01-01 RX ADMIN — Medication 10 ML: at 14:41

## 2021-01-01 RX ADMIN — PROPOFOL 50 MCG/KG/MIN: 10 INJECTION, EMULSION INTRAVENOUS at 09:37

## 2021-01-01 RX ADMIN — INSULIN LISPRO 2 UNITS: 100 INJECTION, SOLUTION INTRAVENOUS; SUBCUTANEOUS at 17:10

## 2021-01-01 RX ADMIN — GUAIFENESIN AND CODEINE PHOSPHATE 10 ML: 100; 10 SOLUTION ORAL at 17:34

## 2021-01-01 RX ADMIN — SODIUM CHLORIDE 40 MG: 9 INJECTION, SOLUTION INTRAMUSCULAR; INTRAVENOUS; SUBCUTANEOUS at 08:20

## 2021-01-01 RX ADMIN — OXYCODONE HYDROCHLORIDE 30 MG: 5 SOLUTION ORAL at 21:23

## 2021-01-01 RX ADMIN — SODIUM CHLORIDE, SODIUM LACTATE, POTASSIUM CHLORIDE, AND CALCIUM CHLORIDE 75 ML/HR: 600; 310; 30; 20 INJECTION, SOLUTION INTRAVENOUS at 02:52

## 2021-01-01 RX ADMIN — DOCUSATE SODIUM 50MG AND SENNOSIDES 8.6MG 1 TABLET: 8.6; 5 TABLET, FILM COATED ORAL at 20:52

## 2021-01-01 RX ADMIN — KETAMINE HYDROCHLORIDE 0.2 MG/KG/HR: 50 INJECTION, SOLUTION INTRAMUSCULAR; INTRAVENOUS at 12:28

## 2021-01-01 RX ADMIN — BUDESONIDE 250 MCG: 0.25 INHALANT RESPIRATORY (INHALATION) at 07:22

## 2021-01-01 RX ADMIN — Medication 10 ML: at 06:01

## 2021-01-01 RX ADMIN — CLONAZEPAM 2 MG: 0.5 TABLET ORAL at 21:48

## 2021-01-01 RX ADMIN — FUROSEMIDE 20 MG: 10 INJECTION, SOLUTION INTRAMUSCULAR; INTRAVENOUS at 18:01

## 2021-01-01 RX ADMIN — CASTOR OIL AND BALSAM, PERU: 788; 87 OINTMENT TOPICAL at 08:25

## 2021-01-01 RX ADMIN — METRONIDAZOLE 500 MG: 500 INJECTION, SOLUTION INTRAVENOUS at 06:14

## 2021-01-01 RX ADMIN — DEXMEDETOMIDINE HYDROCHLORIDE 1.2 MCG/KG/HR: 100 INJECTION, SOLUTION, CONCENTRATE INTRAVENOUS at 08:30

## 2021-01-01 RX ADMIN — DEXMEDETOMIDINE HYDROCHLORIDE 1.5 MCG/KG/HR: 100 INJECTION, SOLUTION, CONCENTRATE INTRAVENOUS at 22:39

## 2021-01-01 RX ADMIN — AZITHROMYCIN MONOHYDRATE 500 MG: 500 INJECTION, POWDER, LYOPHILIZED, FOR SOLUTION INTRAVENOUS at 03:07

## 2021-01-01 RX ADMIN — PROPOFOL 40 MCG/KG/MIN: 10 INJECTION, EMULSION INTRAVENOUS at 03:04

## 2021-01-01 RX ADMIN — IBUPROFEN 800 MG: 400 TABLET ORAL at 00:35

## 2021-01-01 RX ADMIN — Medication 10 ML: at 23:21

## 2021-01-01 RX ADMIN — Medication 1 AMPULE: at 20:51

## 2021-01-01 RX ADMIN — DEXMEDETOMIDINE HYDROCHLORIDE 0.4 MCG/KG/HR: 100 INJECTION, SOLUTION, CONCENTRATE INTRAVENOUS at 23:17

## 2021-01-01 RX ADMIN — INSULIN GLARGINE 15 UNITS: 100 INJECTION, SOLUTION SUBCUTANEOUS at 11:37

## 2021-01-01 RX ADMIN — DEXMEDETOMIDINE HYDROCHLORIDE 0.5 MCG/KG/HR: 100 INJECTION, SOLUTION, CONCENTRATE INTRAVENOUS at 23:52

## 2021-01-01 RX ADMIN — Medication 10 ML: at 23:46

## 2021-01-01 RX ADMIN — Medication 10 ML: at 23:16

## 2021-01-01 RX ADMIN — Medication 300 MCG/HR: at 12:26

## 2021-01-01 RX ADMIN — CASTOR OIL AND BALSAM, PERU: 788; 87 OINTMENT TOPICAL at 17:09

## 2021-01-01 RX ADMIN — Medication 10 ML: at 06:05

## 2021-01-01 RX ADMIN — PROPOFOL 45 MCG/KG/MIN: 10 INJECTION, EMULSION INTRAVENOUS at 15:03

## 2021-01-01 RX ADMIN — CISATRACURIUM BESYLATE 3.5 MCG/KG/MIN: 10 INJECTION INTRAVENOUS at 04:42

## 2021-01-01 RX ADMIN — LEVOFLOXACIN 750 MG: 5 INJECTION, SOLUTION INTRAVENOUS at 10:09

## 2021-01-01 RX ADMIN — DEXMEDETOMIDINE HYDROCHLORIDE 1.2 MCG/KG/HR: 100 INJECTION, SOLUTION, CONCENTRATE INTRAVENOUS at 15:00

## 2021-01-01 RX ADMIN — CHLORHEXIDINE GLUCONATE 15 ML: 0.12 RINSE ORAL at 20:19

## 2021-01-01 RX ADMIN — CISATRACURIUM BESYLATE 3 MCG/KG/MIN: 10 INJECTION INTRAVENOUS at 18:46

## 2021-01-01 RX ADMIN — DOCUSATE SODIUM 50MG AND SENNOSIDES 8.6MG 1 TABLET: 8.6; 5 TABLET, FILM COATED ORAL at 08:07

## 2021-01-01 RX ADMIN — EPOPROSTENOL 30 NG/KG/MIN: 1.5 INJECTION, POWDER, LYOPHILIZED, FOR SOLUTION INTRAVENOUS at 01:46

## 2021-01-01 RX ADMIN — VANCOMYCIN HYDROCHLORIDE 1500 MG: 10 INJECTION, POWDER, LYOPHILIZED, FOR SOLUTION INTRAVENOUS at 09:30

## 2021-01-01 RX ADMIN — ROCURONIUM BROMIDE 50 MG: 50 INJECTION, SOLUTION INTRAVENOUS at 02:59

## 2021-01-01 RX ADMIN — CLONAZEPAM 2 MG: 0.5 TABLET ORAL at 22:36

## 2021-01-01 RX ADMIN — SODIUM CHLORIDE 40 MG: 9 INJECTION, SOLUTION INTRAMUSCULAR; INTRAVENOUS; SUBCUTANEOUS at 08:53

## 2021-01-01 RX ADMIN — MIDAZOLAM 15 MG/HR: 5 INJECTION INTRAMUSCULAR; INTRAVENOUS at 14:25

## 2021-01-01 RX ADMIN — Medication 1 AMPULE: at 08:42

## 2021-01-01 RX ADMIN — BUMETANIDE 2 MG: 0.25 INJECTION, SOLUTION INTRAMUSCULAR; INTRAVENOUS at 09:52

## 2021-01-01 RX ADMIN — Medication 10 ML: at 21:23

## 2021-01-01 RX ADMIN — DOCUSATE SODIUM 50MG AND SENNOSIDES 8.6MG 1 TABLET: 8.6; 5 TABLET, FILM COATED ORAL at 21:02

## 2021-01-01 RX ADMIN — Medication 1 AMPULE: at 21:47

## 2021-01-01 RX ADMIN — CHLORHEXIDINE GLUCONATE 15 ML: 0.12 RINSE ORAL at 20:52

## 2021-01-01 RX ADMIN — MIDAZOLAM 15 MG/HR: 5 INJECTION INTRAMUSCULAR; INTRAVENOUS at 15:09

## 2021-01-01 RX ADMIN — LEVOFLOXACIN 750 MG: 5 INJECTION, SOLUTION INTRAVENOUS at 09:24

## 2021-01-01 RX ADMIN — MIDAZOLAM 15 MG/HR: 5 INJECTION INTRAMUSCULAR; INTRAVENOUS at 18:52

## 2021-01-01 RX ADMIN — Medication 300 MCG/HR: at 21:40

## 2021-01-01 RX ADMIN — VANCOMYCIN HYDROCHLORIDE 1500 MG: 10 INJECTION, POWDER, LYOPHILIZED, FOR SOLUTION INTRAVENOUS at 08:16

## 2021-01-01 RX ADMIN — TOCILIZUMAB 800 MG: 20 INJECTION, SOLUTION, CONCENTRATE INTRAVENOUS at 16:49

## 2021-01-01 RX ADMIN — CEFEPIME HYDROCHLORIDE 2 G: 2 INJECTION, POWDER, FOR SOLUTION INTRAVENOUS at 15:06

## 2021-01-01 RX ADMIN — CHLORHEXIDINE GLUCONATE 15 ML: 0.12 RINSE ORAL at 20:29

## 2021-01-01 RX ADMIN — CHLORHEXIDINE GLUCONATE 15 ML: 0.12 RINSE ORAL at 08:36

## 2021-01-01 RX ADMIN — Medication 300 MCG/HR: at 01:47

## 2021-01-01 RX ADMIN — Medication 300 MCG/HR: at 02:14

## 2021-01-01 RX ADMIN — PROPOFOL 25 MCG/KG/MIN: 10 INJECTION, EMULSION INTRAVENOUS at 16:47

## 2021-01-01 RX ADMIN — PROPOFOL 50 MCG/KG/MIN: 10 INJECTION, EMULSION INTRAVENOUS at 07:07

## 2021-01-01 RX ADMIN — DEXAMETHASONE SODIUM PHOSPHATE 9 MG: 4 INJECTION, SOLUTION INTRAMUSCULAR; INTRAVENOUS at 08:48

## 2021-01-01 RX ADMIN — CASTOR OIL AND BALSAM, PERU: 788; 87 OINTMENT TOPICAL at 08:35

## 2021-01-01 RX ADMIN — INSULIN GLARGINE 15 UNITS: 100 INJECTION, SOLUTION SUBCUTANEOUS at 13:05

## 2021-01-01 RX ADMIN — ENOXAPARIN SODIUM 150 MG: 150 INJECTION SUBCUTANEOUS at 22:06

## 2021-01-01 RX ADMIN — SODIUM CHLORIDE 40 MG: 9 INJECTION, SOLUTION INTRAMUSCULAR; INTRAVENOUS; SUBCUTANEOUS at 08:43

## 2021-01-01 RX ADMIN — OXYCODONE HYDROCHLORIDE 30 MG: 5 SOLUTION ORAL at 05:46

## 2021-01-01 RX ADMIN — CASTOR OIL AND BALSAM, PERU: 788; 87 OINTMENT TOPICAL at 08:36

## 2021-01-01 RX ADMIN — Medication 10 ML: at 05:38

## 2021-01-01 RX ADMIN — DEXMEDETOMIDINE HYDROCHLORIDE 1.2 MCG/KG/HR: 100 INJECTION, SOLUTION, CONCENTRATE INTRAVENOUS at 08:12

## 2021-01-01 RX ADMIN — EPOPROSTENOL 30 NG/KG/MIN: 1.5 INJECTION, POWDER, LYOPHILIZED, FOR SOLUTION INTRAVENOUS at 11:49

## 2021-01-01 RX ADMIN — INSULIN GLARGINE 15 UNITS: 100 INJECTION, SOLUTION SUBCUTANEOUS at 14:09

## 2021-01-01 RX ADMIN — ENOXAPARIN SODIUM 40 MG: 40 INJECTION SUBCUTANEOUS at 23:46

## 2021-01-01 RX ADMIN — MINERAL OIL, PETROLATUM: 425; 568 OINTMENT OPHTHALMIC at 23:16

## 2021-01-01 RX ADMIN — CLONAZEPAM 2 MG: 0.5 TABLET ORAL at 21:39

## 2021-01-01 RX ADMIN — DEXMEDETOMIDINE HYDROCHLORIDE 1.2 MCG/KG/HR: 100 INJECTION, SOLUTION, CONCENTRATE INTRAVENOUS at 17:07

## 2021-01-01 RX ADMIN — OXYCODONE HYDROCHLORIDE 30 MG: 5 SOLUTION ORAL at 13:53

## 2021-01-01 RX ADMIN — DEXMEDETOMIDINE HYDROCHLORIDE 1.5 MCG/KG/HR: 100 INJECTION, SOLUTION, CONCENTRATE INTRAVENOUS at 17:28

## 2021-01-01 RX ADMIN — SODIUM CHLORIDE, SODIUM LACTATE, POTASSIUM CHLORIDE, AND CALCIUM CHLORIDE 100 ML/HR: 600; 310; 30; 20 INJECTION, SOLUTION INTRAVENOUS at 04:41

## 2021-01-01 RX ADMIN — OXYCODONE HYDROCHLORIDE 30 MG: 5 SOLUTION ORAL at 15:13

## 2021-01-01 RX ADMIN — DOCUSATE SODIUM 50MG AND SENNOSIDES 8.6MG 1 TABLET: 8.6; 5 TABLET, FILM COATED ORAL at 08:38

## 2021-01-01 RX ADMIN — EPOPROSTENOL 30 NG/KG/MIN: 1.5 INJECTION, POWDER, LYOPHILIZED, FOR SOLUTION INTRAVENOUS at 21:46

## 2021-01-01 RX ADMIN — CASTOR OIL AND BALSAM, PERU: 788; 87 OINTMENT TOPICAL at 08:37

## 2021-01-01 RX ADMIN — MIDAZOLAM 5 MG/HR: 5 INJECTION INTRAMUSCULAR; INTRAVENOUS at 05:40

## 2021-01-01 RX ADMIN — DOCUSATE SODIUM 50MG AND SENNOSIDES 8.6MG 1 TABLET: 8.6; 5 TABLET, FILM COATED ORAL at 08:40

## 2021-01-01 RX ADMIN — FUROSEMIDE 60 MG: 10 INJECTION, SOLUTION INTRAMUSCULAR; INTRAVENOUS at 22:47

## 2021-01-01 RX ADMIN — Medication 1 AMPULE: at 21:08

## 2021-01-01 RX ADMIN — OXYCODONE HYDROCHLORIDE 30 MG: 5 SOLUTION ORAL at 21:07

## 2021-01-01 RX ADMIN — Medication 250 MCG/HR: at 16:30

## 2021-01-01 RX ADMIN — Medication 10 ML: at 23:42

## 2021-01-01 RX ADMIN — POLYETHYLENE GLYCOL 3350 17 G: 17 POWDER, FOR SOLUTION ORAL at 08:38

## 2021-01-01 RX ADMIN — DEXAMETHASONE SODIUM PHOSPHATE 9 MG: 4 INJECTION, SOLUTION INTRAMUSCULAR; INTRAVENOUS at 08:39

## 2021-01-01 RX ADMIN — FUROSEMIDE 60 MG: 10 INJECTION, SOLUTION INTRAMUSCULAR; INTRAVENOUS at 18:02

## 2021-01-01 RX ADMIN — FLUCONAZOLE 400 MG: 400 INJECTION, SOLUTION INTRAVENOUS at 10:42

## 2021-01-01 RX ADMIN — MIDAZOLAM 6 MG/HR: 5 INJECTION INTRAMUSCULAR; INTRAVENOUS at 04:48

## 2021-01-01 RX ADMIN — DEXMEDETOMIDINE HYDROCHLORIDE 1.2 MCG/KG/HR: 100 INJECTION, SOLUTION, CONCENTRATE INTRAVENOUS at 08:10

## 2021-01-01 RX ADMIN — Medication 200 MCG/HR: at 20:05

## 2021-01-01 RX ADMIN — Medication 10 ML: at 21:51

## 2021-01-01 RX ADMIN — DEXMEDETOMIDINE HYDROCHLORIDE 1.2 MCG/KG/HR: 100 INJECTION, SOLUTION, CONCENTRATE INTRAVENOUS at 13:40

## 2021-01-01 RX ADMIN — FUROSEMIDE 60 MG: 10 INJECTION, SOLUTION INTRAMUSCULAR; INTRAVENOUS at 17:31

## 2021-01-01 RX ADMIN — PROPOFOL 30 MCG/KG/MIN: 10 INJECTION, EMULSION INTRAVENOUS at 23:16

## 2021-01-01 RX ADMIN — METRONIDAZOLE 500 MG: 500 INJECTION, SOLUTION INTRAVENOUS at 15:08

## 2021-01-01 RX ADMIN — INSULIN LISPRO 2 UNITS: 100 INJECTION, SOLUTION INTRAVENOUS; SUBCUTANEOUS at 11:29

## 2021-01-01 RX ADMIN — DEXMEDETOMIDINE HYDROCHLORIDE 1.2 MCG/KG/HR: 100 INJECTION, SOLUTION, CONCENTRATE INTRAVENOUS at 00:56

## 2021-01-01 RX ADMIN — SODIUM CHLORIDE, SODIUM LACTATE, POTASSIUM CHLORIDE, AND CALCIUM CHLORIDE 75 ML/HR: 600; 310; 30; 20 INJECTION, SOLUTION INTRAVENOUS at 12:57

## 2021-01-01 RX ADMIN — Medication 250 MCG/HR: at 08:50

## 2021-01-01 RX ADMIN — Medication 200 MCG/HR: at 10:18

## 2021-01-01 RX ADMIN — VANCOMYCIN HYDROCHLORIDE 1500 MG: 10 INJECTION, POWDER, LYOPHILIZED, FOR SOLUTION INTRAVENOUS at 22:15

## 2021-01-01 RX ADMIN — DEXMEDETOMIDINE HYDROCHLORIDE 1.2 MCG/KG/HR: 100 INJECTION, SOLUTION, CONCENTRATE INTRAVENOUS at 19:49

## 2021-01-01 RX ADMIN — MIDAZOLAM 15 MG/HR: 5 INJECTION INTRAMUSCULAR; INTRAVENOUS at 00:02

## 2021-01-01 RX ADMIN — Medication 250 MCG/HR: at 01:21

## 2021-01-01 RX ADMIN — CEFEPIME HYDROCHLORIDE 2 G: 2 INJECTION, POWDER, FOR SOLUTION INTRAVENOUS at 16:47

## 2021-01-01 RX ADMIN — CLONAZEPAM 2 MG: 0.5 TABLET ORAL at 21:42

## 2021-01-01 RX ADMIN — PROPOFOL 30 MCG/KG/MIN: 10 INJECTION, EMULSION INTRAVENOUS at 20:13

## 2021-01-01 RX ADMIN — CASTOR OIL AND BALSAM, PERU: 788; 87 OINTMENT TOPICAL at 12:40

## 2021-01-01 RX ADMIN — MINERAL OIL, PETROLATUM: 425; 568 OINTMENT OPHTHALMIC at 08:23

## 2021-01-01 RX ADMIN — Medication 40 ML: at 14:51

## 2021-01-01 RX ADMIN — EPOPROSTENOL 20 NG/KG/MIN: 1.5 INJECTION, POWDER, LYOPHILIZED, FOR SOLUTION INTRAVENOUS at 11:00

## 2021-01-01 RX ADMIN — DEXAMETHASONE SODIUM PHOSPHATE 6 MG: 4 INJECTION, SOLUTION INTRA-ARTICULAR; INTRALESIONAL; INTRAMUSCULAR; INTRAVENOUS; SOFT TISSUE at 03:18

## 2021-01-01 RX ADMIN — Medication 300 MCG/HR: at 17:48

## 2021-01-01 RX ADMIN — PROPOFOL 25 MCG/KG/MIN: 10 INJECTION, EMULSION INTRAVENOUS at 02:36

## 2021-01-01 RX ADMIN — PROPOFOL 45 MCG/KG/MIN: 10 INJECTION, EMULSION INTRAVENOUS at 17:06

## 2021-01-01 RX ADMIN — DEXAMETHASONE SODIUM PHOSPHATE 20 MG: 4 INJECTION, SOLUTION INTRAMUSCULAR; INTRAVENOUS at 08:51

## 2021-01-01 RX ADMIN — CLONAZEPAM 2 MG: 0.5 TABLET ORAL at 22:40

## 2021-01-01 RX ADMIN — CHLORHEXIDINE GLUCONATE 15 ML: 0.12 RINSE ORAL at 08:03

## 2021-01-01 RX ADMIN — CEFEPIME HYDROCHLORIDE 2 G: 2 INJECTION, POWDER, FOR SOLUTION INTRAVENOUS at 01:01

## 2021-01-01 RX ADMIN — BUDESONIDE 250 MCG: 0.25 INHALANT RESPIRATORY (INHALATION) at 20:27

## 2021-01-01 RX ADMIN — INSULIN LISPRO 2 UNITS: 100 INJECTION, SOLUTION INTRAVENOUS; SUBCUTANEOUS at 23:20

## 2021-01-01 RX ADMIN — OXYCODONE HYDROCHLORIDE 30 MG: 5 SOLUTION ORAL at 13:46

## 2021-01-01 RX ADMIN — MIDAZOLAM 15 MG/HR: 5 INJECTION INTRAMUSCULAR; INTRAVENOUS at 06:25

## 2021-01-01 RX ADMIN — KETAMINE HYDROCHLORIDE 0.2 MG/KG/HR: 50 INJECTION, SOLUTION INTRAMUSCULAR; INTRAVENOUS at 05:40

## 2021-01-01 RX ADMIN — SODIUM CHLORIDE 1000 ML: 9 INJECTION, SOLUTION INTRAVENOUS at 01:48

## 2021-01-01 RX ADMIN — FLUCONAZOLE 400 MG: 400 INJECTION, SOLUTION INTRAVENOUS at 08:54

## 2021-01-01 RX ADMIN — BUDESONIDE 250 MCG: 0.25 INHALANT RESPIRATORY (INHALATION) at 20:33

## 2021-01-01 RX ADMIN — EPOPROSTENOL 30 NG/KG/MIN: 1.5 INJECTION, POWDER, LYOPHILIZED, FOR SOLUTION INTRAVENOUS at 03:34

## 2021-01-01 RX ADMIN — OXYCODONE HYDROCHLORIDE 30 MG: 5 SOLUTION ORAL at 06:02

## 2021-01-01 RX ADMIN — MIDAZOLAM 15 MG/HR: 5 INJECTION INTRAMUSCULAR; INTRAVENOUS at 06:55

## 2021-01-01 RX ADMIN — VANCOMYCIN HYDROCHLORIDE 1250 MG: 10 INJECTION, POWDER, LYOPHILIZED, FOR SOLUTION INTRAVENOUS at 08:46

## 2021-01-01 RX ADMIN — DEXMEDETOMIDINE HYDROCHLORIDE 1.4 MCG/KG/HR: 100 INJECTION, SOLUTION, CONCENTRATE INTRAVENOUS at 01:17

## 2021-01-01 RX ADMIN — DEXMEDETOMIDINE HYDROCHLORIDE 1.2 MCG/KG/HR: 100 INJECTION, SOLUTION, CONCENTRATE INTRAVENOUS at 05:47

## 2021-01-01 RX ADMIN — INSULIN GLARGINE 15 UNITS: 100 INJECTION, SOLUTION SUBCUTANEOUS at 11:22

## 2021-01-01 RX ADMIN — FUROSEMIDE 60 MG: 10 INJECTION, SOLUTION INTRAMUSCULAR; INTRAVENOUS at 08:55

## 2021-01-01 RX ADMIN — SODIUM CHLORIDE 40 MG: 9 INJECTION, SOLUTION INTRAMUSCULAR; INTRAVENOUS; SUBCUTANEOUS at 09:36

## 2021-01-01 RX ADMIN — CLONAZEPAM 2 MG: 0.5 TABLET ORAL at 08:23

## 2021-01-01 RX ADMIN — ENOXAPARIN SODIUM 40 MG: 40 INJECTION SUBCUTANEOUS at 10:23

## 2021-01-01 RX ADMIN — MIDAZOLAM 15 MG/HR: 5 INJECTION INTRAMUSCULAR; INTRAVENOUS at 03:37

## 2021-01-01 RX ADMIN — OXYCODONE HYDROCHLORIDE 30 MG: 5 SOLUTION ORAL at 21:43

## 2021-01-01 RX ADMIN — ZINC SULFATE 220 MG (50 MG) CAPSULE 1 CAPSULE: CAPSULE at 10:04

## 2021-01-01 RX ADMIN — CHLORHEXIDINE GLUCONATE 15 ML: 0.12 RINSE ORAL at 08:00

## 2021-01-01 RX ADMIN — Medication 10 ML: at 13:46

## 2021-01-01 RX ADMIN — ENOXAPARIN SODIUM 40 MG: 40 INJECTION SUBCUTANEOUS at 21:02

## 2021-01-01 RX ADMIN — Medication 10 ML: at 13:25

## 2021-01-01 RX ADMIN — EPOPROSTENOL 30 NG/KG/MIN: 1.5 INJECTION, POWDER, LYOPHILIZED, FOR SOLUTION INTRAVENOUS at 15:07

## 2021-01-01 RX ADMIN — DEXAMETHASONE SODIUM PHOSPHATE 15 MG: 4 INJECTION, SOLUTION INTRAMUSCULAR; INTRAVENOUS at 08:31

## 2021-01-01 RX ADMIN — ACETAMINOPHEN 650 MG: 325 TABLET ORAL at 19:00

## 2021-01-01 RX ADMIN — SODIUM CHLORIDE 40 MG: 9 INJECTION, SOLUTION INTRAMUSCULAR; INTRAVENOUS; SUBCUTANEOUS at 08:07

## 2021-01-01 RX ADMIN — DEXMEDETOMIDINE HYDROCHLORIDE 1.2 MCG/KG/HR: 100 INJECTION, SOLUTION, CONCENTRATE INTRAVENOUS at 03:58

## 2021-01-01 RX ADMIN — MIDAZOLAM 14 MG/HR: 5 INJECTION INTRAMUSCULAR; INTRAVENOUS at 11:58

## 2021-01-01 RX ADMIN — Medication 300 MCG/HR: at 00:13

## 2021-01-01 RX ADMIN — Medication 200 MCG/HR: at 17:03

## 2021-01-01 RX ADMIN — DEXMEDETOMIDINE HYDROCHLORIDE 1.5 MCG/KG/HR: 100 INJECTION, SOLUTION, CONCENTRATE INTRAVENOUS at 08:39

## 2021-01-01 RX ADMIN — OXYCODONE HYDROCHLORIDE 30 MG: 5 SOLUTION ORAL at 15:08

## 2021-01-01 RX ADMIN — IPRATROPIUM BROMIDE AND ALBUTEROL SULFATE 3 ML: .5; 3 SOLUTION RESPIRATORY (INHALATION) at 22:20

## 2021-01-01 RX ADMIN — CLONAZEPAM 2 MG: 0.5 TABLET ORAL at 22:22

## 2021-01-01 RX ADMIN — DEXMEDETOMIDINE HYDROCHLORIDE 1.2 MCG/KG/HR: 100 INJECTION, SOLUTION, CONCENTRATE INTRAVENOUS at 23:57

## 2021-01-01 RX ADMIN — LORAZEPAM 4 MG: 2 INJECTION INTRAMUSCULAR; INTRAVENOUS at 06:18

## 2021-01-01 RX ADMIN — BUDESONIDE 250 MCG: 0.25 INHALANT RESPIRATORY (INHALATION) at 07:47

## 2021-01-01 RX ADMIN — DEXAMETHASONE SODIUM PHOSPHATE 6 MG: 4 INJECTION, SOLUTION INTRAMUSCULAR; INTRAVENOUS at 06:54

## 2021-01-01 RX ADMIN — DEXMEDETOMIDINE HYDROCHLORIDE 1.2 MCG/KG/HR: 100 INJECTION, SOLUTION, CONCENTRATE INTRAVENOUS at 22:12

## 2021-01-01 RX ADMIN — EPOPROSTENOL 30 NG/KG/MIN: 1.5 INJECTION, POWDER, LYOPHILIZED, FOR SOLUTION INTRAVENOUS at 06:49

## 2021-01-01 RX ADMIN — BUDESONIDE 250 MCG: 0.25 INHALANT RESPIRATORY (INHALATION) at 08:53

## 2021-01-01 RX ADMIN — OXYCODONE HYDROCHLORIDE 30 MG: 5 SOLUTION ORAL at 14:30

## 2021-01-01 RX ADMIN — CEFEPIME HYDROCHLORIDE 2 G: 2 INJECTION, POWDER, FOR SOLUTION INTRAVENOUS at 16:18

## 2021-01-01 RX ADMIN — OXYCODONE HYDROCHLORIDE 30 MG: 5 SOLUTION ORAL at 05:38

## 2021-01-01 RX ADMIN — DEXMEDETOMIDINE HYDROCHLORIDE 1.2 MCG/KG/HR: 100 INJECTION, SOLUTION, CONCENTRATE INTRAVENOUS at 18:09

## 2021-01-01 RX ADMIN — Medication 200 MCG/HR: at 20:28

## 2021-01-01 RX ADMIN — FUROSEMIDE 60 MG: 10 INJECTION, SOLUTION INTRAMUSCULAR; INTRAVENOUS at 06:02

## 2021-01-01 RX ADMIN — CISATRACURIUM BESYLATE 3.5 MCG/KG/MIN: 10 INJECTION INTRAVENOUS at 03:24

## 2021-01-01 RX ADMIN — INSULIN LISPRO 3 UNITS: 100 INJECTION, SOLUTION INTRAVENOUS; SUBCUTANEOUS at 18:29

## 2021-01-01 RX ADMIN — PROPOFOL 50 MCG/KG/MIN: 10 INJECTION, EMULSION INTRAVENOUS at 06:05

## 2021-01-01 RX ADMIN — BUDESONIDE 250 MCG: 0.25 INHALANT RESPIRATORY (INHALATION) at 21:47

## 2021-01-01 RX ADMIN — CASTOR OIL AND BALSAM, PERU: 788; 87 OINTMENT TOPICAL at 10:12

## 2021-01-01 RX ADMIN — FLUCONAZOLE 400 MG: 400 INJECTION, SOLUTION INTRAVENOUS at 10:40

## 2021-01-01 RX ADMIN — CASTOR OIL AND BALSAM, PERU: 788; 87 OINTMENT TOPICAL at 17:49

## 2021-01-01 RX ADMIN — DEXMEDETOMIDINE HYDROCHLORIDE 1.2 MCG/KG/HR: 100 INJECTION, SOLUTION, CONCENTRATE INTRAVENOUS at 12:57

## 2021-01-01 RX ADMIN — INSULIN GLARGINE 15 UNITS: 100 INJECTION, SOLUTION SUBCUTANEOUS at 12:09

## 2021-01-01 RX ADMIN — Medication 1 AMPULE: at 20:52

## 2021-01-01 RX ADMIN — BUDESONIDE 250 MCG: 0.25 INHALANT RESPIRATORY (INHALATION) at 19:40

## 2021-01-01 RX ADMIN — BUDESONIDE 250 MCG: 0.25 INHALANT RESPIRATORY (INHALATION) at 20:03

## 2021-01-01 RX ADMIN — INSULIN LISPRO 2 UNITS: 100 INJECTION, SOLUTION INTRAVENOUS; SUBCUTANEOUS at 18:12

## 2021-01-01 RX ADMIN — KETAMINE HYDROCHLORIDE 0.2 MG/KG/HR: 50 INJECTION, SOLUTION INTRAMUSCULAR; INTRAVENOUS at 10:27

## 2021-01-01 RX ADMIN — DEXMEDETOMIDINE HYDROCHLORIDE 1.2 MCG/KG/HR: 100 INJECTION, SOLUTION, CONCENTRATE INTRAVENOUS at 19:46

## 2021-01-01 RX ADMIN — FLUCONAZOLE 400 MG: 400 INJECTION, SOLUTION INTRAVENOUS at 09:47

## 2021-01-01 RX ADMIN — LORAZEPAM 0.5 MG: 2 INJECTION, SOLUTION INTRAMUSCULAR; INTRAVENOUS at 02:33

## 2021-01-01 RX ADMIN — DEXAMETHASONE SODIUM PHOSPHATE 9 MG: 4 INJECTION, SOLUTION INTRAMUSCULAR; INTRAVENOUS at 08:40

## 2021-01-01 RX ADMIN — INSULIN LISPRO 2 UNITS: 100 INJECTION, SOLUTION INTRAVENOUS; SUBCUTANEOUS at 00:28

## 2021-01-01 RX ADMIN — KETAMINE HYDROCHLORIDE 0.2 MG/KG/HR: 50 INJECTION, SOLUTION INTRAMUSCULAR; INTRAVENOUS at 22:21

## 2021-01-01 RX ADMIN — Medication 10 ML: at 06:08

## 2021-01-01 RX ADMIN — CHLORHEXIDINE GLUCONATE 15 ML: 0.12 RINSE ORAL at 08:26

## 2021-01-01 RX ADMIN — ROCURONIUM BROMIDE 80 MG: 10 INJECTION, SOLUTION INTRAVENOUS at 03:10

## 2021-01-01 RX ADMIN — Medication 1 AMPULE: at 20:29

## 2021-01-01 RX ADMIN — DEXMEDETOMIDINE HYDROCHLORIDE 1.2 MCG/KG/HR: 100 INJECTION, SOLUTION, CONCENTRATE INTRAVENOUS at 00:29

## 2021-01-01 RX ADMIN — Medication 300 MCG/HR: at 07:09

## 2021-01-01 RX ADMIN — CLONAZEPAM 2 MG: 0.5 TABLET ORAL at 15:05

## 2021-01-01 RX ADMIN — Medication 300 MCG/HR: at 08:32

## 2021-01-01 RX ADMIN — ROCURONIUM BROMIDE 50 MG: 50 INJECTION, SOLUTION INTRAVENOUS at 13:33

## 2021-01-01 RX ADMIN — Medication 1 AMPULE: at 21:36

## 2021-01-01 RX ADMIN — FLUCONAZOLE 400 MG: 400 INJECTION, SOLUTION INTRAVENOUS at 08:27

## 2021-01-01 RX ADMIN — Medication 10 ML: at 13:27

## 2021-01-01 RX ADMIN — Medication: at 10:39

## 2021-01-01 RX ADMIN — VANCOMYCIN HYDROCHLORIDE 1500 MG: 10 INJECTION, POWDER, LYOPHILIZED, FOR SOLUTION INTRAVENOUS at 20:19

## 2021-01-01 RX ADMIN — ACETAMINOPHEN 650 MG: 650 SUPPOSITORY RECTAL at 05:49

## 2021-01-01 RX ADMIN — DEXMEDETOMIDINE HYDROCHLORIDE 1.2 MCG/KG/HR: 100 INJECTION, SOLUTION, CONCENTRATE INTRAVENOUS at 15:16

## 2021-01-01 RX ADMIN — CHLORHEXIDINE GLUCONATE 15 ML: 0.12 RINSE ORAL at 08:31

## 2021-01-01 RX ADMIN — ENOXAPARIN SODIUM 40 MG: 40 INJECTION SUBCUTANEOUS at 08:31

## 2021-01-01 RX ADMIN — CHLORHEXIDINE GLUCONATE 15 ML: 0.12 RINSE ORAL at 08:53

## 2021-01-01 RX ADMIN — ROCURONIUM BROMIDE 50 MG: 10 INJECTION, SOLUTION INTRAVENOUS at 05:11

## 2021-01-01 RX ADMIN — CASTOR OIL AND BALSAM, PERU: 788; 87 OINTMENT TOPICAL at 08:08

## 2021-01-01 RX ADMIN — DEXMEDETOMIDINE HYDROCHLORIDE 1.2 MCG/KG/HR: 100 INJECTION, SOLUTION, CONCENTRATE INTRAVENOUS at 01:23

## 2021-01-01 RX ADMIN — BUDESONIDE 250 MCG: 0.25 INHALANT RESPIRATORY (INHALATION) at 09:00

## 2021-01-01 RX ADMIN — GUAIFENESIN AND CODEINE PHOSPHATE 10 ML: 100; 10 SOLUTION ORAL at 20:45

## 2021-01-01 RX ADMIN — INSULIN LISPRO 2 UNITS: 100 INJECTION, SOLUTION INTRAVENOUS; SUBCUTANEOUS at 11:51

## 2021-01-01 RX ADMIN — DEXMEDETOMIDINE HYDROCHLORIDE 1.2 MCG/KG/HR: 100 INJECTION, SOLUTION, CONCENTRATE INTRAVENOUS at 22:28

## 2021-01-01 RX ADMIN — DEXAMETHASONE SODIUM PHOSPHATE 15 MG: 4 INJECTION, SOLUTION INTRAMUSCULAR; INTRAVENOUS at 08:23

## 2021-01-01 RX ADMIN — VANCOMYCIN HYDROCHLORIDE 2500 MG: 10 INJECTION, POWDER, LYOPHILIZED, FOR SOLUTION INTRAVENOUS at 08:28

## 2021-01-01 RX ADMIN — SODIUM CHLORIDE 1000 ML: 900 INJECTION, SOLUTION INTRAVENOUS at 00:27

## 2021-01-01 RX ADMIN — PROPOFOL 40 MCG/KG/MIN: 10 INJECTION, EMULSION INTRAVENOUS at 08:09

## 2021-01-01 RX ADMIN — Medication 200 MCG/HR: at 17:35

## 2021-01-01 RX ADMIN — Medication 5 ML: at 06:00

## 2021-01-01 RX ADMIN — DEXMEDETOMIDINE HYDROCHLORIDE 1.5 MCG/KG/HR: 100 INJECTION, SOLUTION, CONCENTRATE INTRAVENOUS at 19:41

## 2021-01-01 RX ADMIN — INSULIN LISPRO 2 UNITS: 100 INJECTION, SOLUTION INTRAVENOUS; SUBCUTANEOUS at 17:21

## 2021-01-01 RX ADMIN — CLONAZEPAM 2 MG: 0.5 TABLET ORAL at 21:51

## 2021-01-01 RX ADMIN — Medication: at 08:59

## 2021-01-01 RX ADMIN — ROCURONIUM BROMIDE 50 MG: 10 INJECTION, SOLUTION INTRAVENOUS at 03:26

## 2021-01-01 RX ADMIN — DEXAMETHASONE SODIUM PHOSPHATE 9 MG: 4 INJECTION, SOLUTION INTRAMUSCULAR; INTRAVENOUS at 08:07

## 2021-01-01 RX ADMIN — CHLORHEXIDINE GLUCONATE 15 ML: 0.12 RINSE ORAL at 08:39

## 2021-01-01 RX ADMIN — PROPOFOL 50 MCG/KG/MIN: 10 INJECTION, EMULSION INTRAVENOUS at 03:27

## 2021-01-01 RX ADMIN — Medication 3 MG: at 20:47

## 2021-01-01 RX ADMIN — MIDAZOLAM 15 MG/HR: 5 INJECTION INTRAMUSCULAR; INTRAVENOUS at 21:38

## 2021-01-01 RX ADMIN — Medication 1 MCG/MIN: at 12:14

## 2021-01-01 RX ADMIN — DEXMEDETOMIDINE HYDROCHLORIDE 0.6 MCG/KG/HR: 100 INJECTION, SOLUTION, CONCENTRATE INTRAVENOUS at 06:24

## 2021-01-01 RX ADMIN — PROPOFOL 50 MCG/KG/MIN: 10 INJECTION, EMULSION INTRAVENOUS at 13:02

## 2021-01-01 RX ADMIN — ACETAMINOPHEN ORAL SOLUTION 650 MG: 650 SOLUTION ORAL at 08:51

## 2021-01-01 RX ADMIN — EPOPROSTENOL 30 NG/KG/MIN: 1.5 INJECTION, POWDER, LYOPHILIZED, FOR SOLUTION INTRAVENOUS at 04:43

## 2021-01-01 RX ADMIN — VANCOMYCIN HYDROCHLORIDE 1250 MG: 10 INJECTION, POWDER, LYOPHILIZED, FOR SOLUTION INTRAVENOUS at 20:23

## 2021-01-01 RX ADMIN — FUROSEMIDE 80 MG: 10 INJECTION INTRAMUSCULAR; INTRAVENOUS at 02:42

## 2021-01-01 RX ADMIN — DEXMEDETOMIDINE HYDROCHLORIDE 1.4 MCG/KG/HR: 100 INJECTION, SOLUTION, CONCENTRATE INTRAVENOUS at 11:19

## 2021-01-01 RX ADMIN — DOCUSATE SODIUM 50MG AND SENNOSIDES 8.6MG 1 TABLET: 8.6; 5 TABLET, FILM COATED ORAL at 21:08

## 2021-01-01 RX ADMIN — CASTOR OIL AND BALSAM, PERU: 788; 87 OINTMENT TOPICAL at 08:32

## 2021-01-01 RX ADMIN — Medication 50 MCG/HR: at 04:50

## 2021-01-01 RX ADMIN — Medication 250 MCG/HR: at 17:32

## 2021-01-01 RX ADMIN — CEFEPIME HYDROCHLORIDE 2 G: 2 INJECTION, POWDER, FOR SOLUTION INTRAVENOUS at 00:00

## 2021-01-01 RX ADMIN — CHLORHEXIDINE GLUCONATE 15 ML: 0.12 RINSE ORAL at 21:08

## 2021-01-01 RX ADMIN — Medication 300 MCG/HR: at 21:21

## 2021-01-01 RX ADMIN — SODIUM CHLORIDE, SODIUM LACTATE, POTASSIUM CHLORIDE, AND CALCIUM CHLORIDE 75 ML/HR: 600; 310; 30; 20 INJECTION, SOLUTION INTRAVENOUS at 08:48

## 2021-01-01 RX ADMIN — PROPOFOL 50 MCG/KG/MIN: 10 INJECTION, EMULSION INTRAVENOUS at 21:57

## 2021-01-01 RX ADMIN — Medication 2 MCG/MIN: at 21:07

## 2021-01-01 RX ADMIN — Medication 200 MCG/HR: at 14:53

## 2021-01-01 RX ADMIN — FENTANYL CITRATE 100 MCG: 50 INJECTION, SOLUTION INTRAMUSCULAR; INTRAVENOUS at 04:37

## 2021-01-01 RX ADMIN — BUDESONIDE 250 MCG: 0.25 INHALANT RESPIRATORY (INHALATION) at 07:55

## 2021-01-01 RX ADMIN — ROCURONIUM BROMIDE 50 MG: 50 INJECTION, SOLUTION INTRAVENOUS at 17:41

## 2021-01-01 RX ADMIN — MIDAZOLAM 15 MG/HR: 5 INJECTION INTRAMUSCULAR; INTRAVENOUS at 18:02

## 2021-01-01 RX ADMIN — CHLORHEXIDINE GLUCONATE 15 ML: 0.12 RINSE ORAL at 08:23

## 2021-01-01 RX ADMIN — CASTOR OIL AND BALSAM, PERU: 788; 87 OINTMENT TOPICAL at 08:01

## 2021-01-01 RX ADMIN — Medication 250 MCG/HR: at 22:02

## 2021-01-01 RX ADMIN — KETAMINE HYDROCHLORIDE 0.2 MG/KG/HR: 50 INJECTION, SOLUTION INTRAMUSCULAR; INTRAVENOUS at 02:23

## 2021-01-01 RX ADMIN — DEXMEDETOMIDINE HYDROCHLORIDE 1.2 MCG/KG/HR: 100 INJECTION, SOLUTION, CONCENTRATE INTRAVENOUS at 06:06

## 2021-01-01 RX ADMIN — CEFEPIME HYDROCHLORIDE 2 G: 2 INJECTION, POWDER, FOR SOLUTION INTRAVENOUS at 07:45

## 2021-01-01 RX ADMIN — ROCURONIUM BROMIDE 50 MG: 50 INJECTION, SOLUTION INTRAVENOUS at 05:09

## 2021-01-01 RX ADMIN — Medication 250 MCG/HR: at 06:04

## 2021-01-01 RX ADMIN — INSULIN LISPRO 2 UNITS: 100 INJECTION, SOLUTION INTRAVENOUS; SUBCUTANEOUS at 18:38

## 2021-01-01 RX ADMIN — INSULIN LISPRO 7 UNITS: 100 INJECTION, SOLUTION INTRAVENOUS; SUBCUTANEOUS at 17:58

## 2021-01-01 RX ADMIN — Medication 300 MCG/HR: at 13:51

## 2021-01-01 RX ADMIN — MIDAZOLAM 14 MG/HR: 5 INJECTION INTRAMUSCULAR; INTRAVENOUS at 05:04

## 2021-01-01 RX ADMIN — Medication 10 ML: at 15:39

## 2021-01-01 RX ADMIN — SODIUM CHLORIDE 40 MG: 9 INJECTION, SOLUTION INTRAMUSCULAR; INTRAVENOUS; SUBCUTANEOUS at 08:34

## 2021-01-01 RX ADMIN — DEXMEDETOMIDINE HYDROCHLORIDE 1.2 MCG/KG/HR: 100 INJECTION, SOLUTION, CONCENTRATE INTRAVENOUS at 08:29

## 2021-01-01 RX ADMIN — SODIUM CHLORIDE 0.5 MCG/KG/HR: 9 INJECTION, SOLUTION INTRAVENOUS at 16:41

## 2021-01-01 RX ADMIN — PROPOFOL 15 MCG/KG/MIN: 10 INJECTION, EMULSION INTRAVENOUS at 21:53

## 2021-01-01 RX ADMIN — INSULIN LISPRO 2 UNITS: 100 INJECTION, SOLUTION INTRAVENOUS; SUBCUTANEOUS at 00:35

## 2021-01-01 RX ADMIN — ROCURONIUM BROMIDE 50 MG: 50 INJECTION, SOLUTION INTRAVENOUS at 14:58

## 2021-01-01 RX ADMIN — MINERAL OIL, PETROLATUM: 425; 568 OINTMENT OPHTHALMIC at 20:05

## 2021-01-01 RX ADMIN — ENOXAPARIN SODIUM 40 MG: 40 INJECTION SUBCUTANEOUS at 23:21

## 2021-01-01 RX ADMIN — DEXMEDETOMIDINE HYDROCHLORIDE 1.5 MCG/KG/HR: 100 INJECTION, SOLUTION, CONCENTRATE INTRAVENOUS at 00:33

## 2021-01-01 RX ADMIN — Medication 10 ML: at 21:05

## 2021-01-01 RX ADMIN — SODIUM CHLORIDE 40 MG: 9 INJECTION, SOLUTION INTRAMUSCULAR; INTRAVENOUS; SUBCUTANEOUS at 09:53

## 2021-01-01 RX ADMIN — MIDAZOLAM 15 MG/HR: 5 INJECTION INTRAMUSCULAR; INTRAVENOUS at 23:41

## 2021-01-01 RX ADMIN — Medication 10 ML: at 06:06

## 2021-01-01 RX ADMIN — OXYCODONE HYDROCHLORIDE 30 MG: 5 SOLUTION ORAL at 15:05

## 2021-01-01 RX ADMIN — OXYCODONE HYDROCHLORIDE 30 MG: 5 SOLUTION ORAL at 06:14

## 2021-01-01 RX ADMIN — Medication 200 MCG/HR: at 03:27

## 2021-01-01 RX ADMIN — INSULIN LISPRO 2 UNITS: 100 INJECTION, SOLUTION INTRAVENOUS; SUBCUTANEOUS at 12:59

## 2021-01-01 RX ADMIN — Medication 1 AMPULE: at 08:36

## 2021-01-01 RX ADMIN — Medication 300 MCG/HR: at 04:34

## 2021-01-01 RX ADMIN — PROPOFOL 50 MCG/KG/MIN: 10 INJECTION, EMULSION INTRAVENOUS at 12:55

## 2021-01-01 RX ADMIN — Medication 2 MCG/MIN: at 04:56

## 2021-01-01 RX ADMIN — INSULIN LISPRO 3 UNITS: 100 INJECTION, SOLUTION INTRAVENOUS; SUBCUTANEOUS at 18:20

## 2021-01-01 RX ADMIN — MIDAZOLAM 15 MG/HR: 5 INJECTION INTRAMUSCULAR; INTRAVENOUS at 06:13

## 2021-01-01 RX ADMIN — Medication 4 MCG/MIN: at 08:00

## 2021-01-01 RX ADMIN — DOCUSATE SODIUM 50MG AND SENNOSIDES 8.6MG 1 TABLET: 8.6; 5 TABLET, FILM COATED ORAL at 08:58

## 2021-01-01 RX ADMIN — PROPOFOL 50 MCG/KG/MIN: 10 INJECTION, EMULSION INTRAVENOUS at 06:18

## 2021-01-01 RX ADMIN — CLONAZEPAM 2 MG: 0.5 TABLET ORAL at 08:32

## 2021-01-01 RX ADMIN — OXYCODONE HYDROCHLORIDE 30 MG: 5 SOLUTION ORAL at 22:41

## 2021-01-01 RX ADMIN — EPOPROSTENOL 30 NG/KG/MIN: 1.5 INJECTION, POWDER, LYOPHILIZED, FOR SOLUTION INTRAVENOUS at 04:15

## 2021-01-01 RX ADMIN — Medication 1 AMPULE: at 08:22

## 2021-01-01 RX ADMIN — VANCOMYCIN HYDROCHLORIDE 1500 MG: 10 INJECTION, POWDER, LYOPHILIZED, FOR SOLUTION INTRAVENOUS at 21:00

## 2021-01-01 RX ADMIN — DEXAMETHASONE SODIUM PHOSPHATE 20 MG: 4 INJECTION, SOLUTION INTRAMUSCULAR; INTRAVENOUS at 09:38

## 2021-01-01 RX ADMIN — Medication 200 MCG/HR: at 22:09

## 2021-01-01 RX ADMIN — KETAMINE HYDROCHLORIDE 0.2 MG/KG/HR: 50 INJECTION, SOLUTION INTRAMUSCULAR; INTRAVENOUS at 11:55

## 2021-01-01 RX ADMIN — Medication 200 MCG/HR: at 00:44

## 2021-01-01 RX ADMIN — CHLORHEXIDINE GLUCONATE 15 ML: 0.12 RINSE ORAL at 21:00

## 2021-01-01 RX ADMIN — BUDESONIDE 250 MCG: 0.25 INHALANT RESPIRATORY (INHALATION) at 08:36

## 2021-01-01 RX ADMIN — Medication 300 MCG/HR: at 05:36

## 2021-01-01 RX ADMIN — MIDAZOLAM 15 MG/HR: 5 INJECTION INTRAMUSCULAR; INTRAVENOUS at 00:31

## 2021-01-01 RX ADMIN — ROCURONIUM BROMIDE 50 MG: 50 INJECTION, SOLUTION INTRAVENOUS at 13:15

## 2021-01-01 RX ADMIN — OXYCODONE HYDROCHLORIDE 30 MG: 5 SOLUTION ORAL at 06:56

## 2021-01-01 RX ADMIN — ALBUTEROL SULFATE 2 PUFF: 90 AEROSOL, METERED RESPIRATORY (INHALATION) at 12:21

## 2021-01-01 RX ADMIN — DEXMEDETOMIDINE HYDROCHLORIDE 0.5 MCG/KG/HR: 100 INJECTION, SOLUTION, CONCENTRATE INTRAVENOUS at 02:21

## 2021-01-01 RX ADMIN — LORAZEPAM 4 MG: 2 INJECTION INTRAMUSCULAR; INTRAVENOUS at 04:32

## 2021-01-01 RX ADMIN — Medication 10 ML: at 22:14

## 2021-01-01 RX ADMIN — BUDESONIDE 250 MCG: 0.25 INHALANT RESPIRATORY (INHALATION) at 09:18

## 2021-01-01 RX ADMIN — CISATRACURIUM BESYLATE 3.5 MCG/KG/MIN: 10 INJECTION INTRAVENOUS at 07:18

## 2021-01-01 RX ADMIN — INSULIN LISPRO 3 UNITS: 100 INJECTION, SOLUTION INTRAVENOUS; SUBCUTANEOUS at 11:37

## 2021-01-01 RX ADMIN — CASTOR OIL AND BALSAM, PERU: 788; 87 OINTMENT TOPICAL at 17:44

## 2021-01-01 RX ADMIN — CLONAZEPAM 2 MG: 0.5 TABLET ORAL at 21:40

## 2021-01-01 RX ADMIN — OXYCODONE HYDROCHLORIDE 30 MG: 5 SOLUTION ORAL at 14:11

## 2021-01-01 RX ADMIN — LORAZEPAM 0.5 MG: 2 INJECTION INTRAMUSCULAR; INTRAVENOUS at 02:33

## 2021-01-01 RX ADMIN — PROPOFOL 50 MCG/KG/MIN: 10 INJECTION, EMULSION INTRAVENOUS at 19:25

## 2021-01-01 RX ADMIN — DEXAMETHASONE SODIUM PHOSPHATE 20 MG: 4 INJECTION, SOLUTION INTRAMUSCULAR; INTRAVENOUS at 11:47

## 2021-01-01 RX ADMIN — ENOXAPARIN SODIUM 40 MG: 40 INJECTION SUBCUTANEOUS at 10:45

## 2021-01-01 RX ADMIN — DEXMEDETOMIDINE HYDROCHLORIDE 1.2 MCG/KG/HR: 100 INJECTION, SOLUTION, CONCENTRATE INTRAVENOUS at 20:28

## 2021-01-01 RX ADMIN — MIDAZOLAM 5 MG/HR: 5 INJECTION INTRAMUSCULAR; INTRAVENOUS at 10:50

## 2021-01-01 RX ADMIN — EPOPROSTENOL 30 NG/KG/MIN: 1.5 INJECTION, POWDER, LYOPHILIZED, FOR SOLUTION INTRAVENOUS at 08:10

## 2021-01-01 RX ADMIN — PROPOFOL 30 MCG/KG/MIN: 10 INJECTION, EMULSION INTRAVENOUS at 08:44

## 2021-01-01 RX ADMIN — SODIUM CHLORIDE 40 MG: 9 INJECTION, SOLUTION INTRAMUSCULAR; INTRAVENOUS; SUBCUTANEOUS at 08:46

## 2021-01-01 RX ADMIN — INSULIN LISPRO 3 UNITS: 100 INJECTION, SOLUTION INTRAVENOUS; SUBCUTANEOUS at 13:08

## 2021-01-01 RX ADMIN — Medication: at 08:31

## 2021-01-01 RX ADMIN — CHLORHEXIDINE GLUCONATE 15 ML: 0.12 RINSE ORAL at 08:08

## 2021-01-01 RX ADMIN — LORAZEPAM 4 MG: 2 INJECTION INTRAMUSCULAR; INTRAVENOUS at 13:01

## 2021-01-01 RX ADMIN — MIDAZOLAM 5 MG/HR: 5 INJECTION INTRAMUSCULAR; INTRAVENOUS at 17:16

## 2021-01-01 RX ADMIN — DEXMEDETOMIDINE HYDROCHLORIDE 1.2 MCG/KG/HR: 100 INJECTION, SOLUTION, CONCENTRATE INTRAVENOUS at 03:22

## 2021-01-01 RX ADMIN — DEXMEDETOMIDINE HYDROCHLORIDE 1.2 MCG/KG/HR: 100 INJECTION, SOLUTION, CONCENTRATE INTRAVENOUS at 05:22

## 2021-01-01 RX ADMIN — OXYCODONE HYDROCHLORIDE 30 MG: 5 SOLUTION ORAL at 21:52

## 2021-01-01 RX ADMIN — Medication 250 MCG/HR: at 03:09

## 2021-01-01 RX ADMIN — CHLORHEXIDINE GLUCONATE 15 ML: 0.12 RINSE ORAL at 08:52

## 2021-01-01 RX ADMIN — ROCURONIUM BROMIDE 50 MG: 50 INJECTION, SOLUTION INTRAVENOUS at 17:55

## 2021-01-01 RX ADMIN — OXYCODONE HYDROCHLORIDE 30 MG: 5 SOLUTION ORAL at 21:40

## 2021-01-01 RX ADMIN — ALBUTEROL SULFATE 2 PUFF: 90 AEROSOL, METERED RESPIRATORY (INHALATION) at 15:58

## 2021-01-01 RX ADMIN — DEXMEDETOMIDINE HYDROCHLORIDE 1.2 MCG/KG/HR: 100 INJECTION, SOLUTION, CONCENTRATE INTRAVENOUS at 05:12

## 2021-01-01 RX ADMIN — SODIUM CHLORIDE 0.5 MCG/KG/HR: 9 INJECTION, SOLUTION INTRAVENOUS at 22:46

## 2021-01-01 RX ADMIN — CASTOR OIL AND BALSAM, PERU: 788; 87 OINTMENT TOPICAL at 08:05

## 2021-01-01 RX ADMIN — KETAMINE HYDROCHLORIDE 0.5 MG/KG/HR: 50 INJECTION INTRAMUSCULAR; INTRAVENOUS at 01:19

## 2021-01-01 RX ADMIN — FUROSEMIDE 40 MG: 10 INJECTION, SOLUTION INTRAMUSCULAR; INTRAVENOUS at 08:20

## 2021-01-01 RX ADMIN — MIDAZOLAM 15 MG/HR: 5 INJECTION INTRAMUSCULAR; INTRAVENOUS at 12:31

## 2021-01-01 RX ADMIN — DEXAMETHASONE 6 MG: 4 TABLET ORAL at 03:06

## 2021-01-01 RX ADMIN — DEXAMETHASONE SODIUM PHOSPHATE 20 MG: 4 INJECTION, SOLUTION INTRAMUSCULAR; INTRAVENOUS at 08:07

## 2021-01-01 RX ADMIN — FUROSEMIDE 60 MG: 10 INJECTION, SOLUTION INTRAMUSCULAR; INTRAVENOUS at 21:43

## 2021-01-01 RX ADMIN — ACETAMINOPHEN ORAL SOLUTION 650 MG: 650 SOLUTION ORAL at 20:22

## 2021-01-01 RX ADMIN — METRONIDAZOLE 500 MG: 500 INJECTION, SOLUTION INTRAVENOUS at 03:00

## 2021-01-01 RX ADMIN — ROCURONIUM BROMIDE 50 MG: 50 INJECTION, SOLUTION INTRAVENOUS at 04:02

## 2021-01-01 RX ADMIN — DEXMEDETOMIDINE HYDROCHLORIDE 1.5 MCG/KG/HR: 100 INJECTION, SOLUTION, CONCENTRATE INTRAVENOUS at 10:40

## 2021-01-01 RX ADMIN — SODIUM CHLORIDE 40 MG: 9 INJECTION, SOLUTION INTRAMUSCULAR; INTRAVENOUS; SUBCUTANEOUS at 08:55

## 2021-01-01 RX ADMIN — SODIUM CHLORIDE 1000 ML: 900 INJECTION, SOLUTION INTRAVENOUS at 00:57

## 2021-01-01 RX ADMIN — PROPOFOL 50 MCG/KG/MIN: 10 INJECTION, EMULSION INTRAVENOUS at 00:18

## 2021-01-01 RX ADMIN — DEXMEDETOMIDINE HYDROCHLORIDE 1.2 MCG/KG/HR: 100 INJECTION, SOLUTION, CONCENTRATE INTRAVENOUS at 10:25

## 2021-01-01 RX ADMIN — NOREPINEPHRINE BITARTRATE 3 MCG/MIN: 1 INJECTION, SOLUTION, CONCENTRATE INTRAVENOUS at 13:23

## 2021-01-01 RX ADMIN — Medication 10 ML: at 21:42

## 2021-01-01 RX ADMIN — CISATRACURIUM BESYLATE 3 MCG/KG/MIN: 10 INJECTION INTRAVENOUS at 03:56

## 2021-01-01 RX ADMIN — EPOPROSTENOL 30 NG/KG/MIN: 1.5 INJECTION, POWDER, LYOPHILIZED, FOR SOLUTION INTRAVENOUS at 03:20

## 2021-01-01 RX ADMIN — SODIUM CHLORIDE 40 MG: 9 INJECTION, SOLUTION INTRAMUSCULAR; INTRAVENOUS; SUBCUTANEOUS at 08:39

## 2021-01-01 RX ADMIN — WATER: 1 INJECTION INTRAMUSCULAR; INTRAVENOUS; SUBCUTANEOUS at 17:00

## 2021-01-01 RX ADMIN — MIDAZOLAM 15 MG/HR: 5 INJECTION INTRAMUSCULAR; INTRAVENOUS at 14:08

## 2021-01-01 RX ADMIN — CEFEPIME HYDROCHLORIDE 2 G: 2 INJECTION, POWDER, FOR SOLUTION INTRAVENOUS at 23:21

## 2021-01-01 RX ADMIN — REMDESIVIR 200 MG: 100 INJECTION, POWDER, LYOPHILIZED, FOR SOLUTION INTRAVENOUS at 12:15

## 2021-01-01 RX ADMIN — VANCOMYCIN HYDROCHLORIDE 1500 MG: 10 INJECTION, POWDER, LYOPHILIZED, FOR SOLUTION INTRAVENOUS at 08:08

## 2021-01-01 RX ADMIN — ENOXAPARIN SODIUM 40 MG: 40 INJECTION SUBCUTANEOUS at 23:31

## 2021-01-01 RX ADMIN — BUDESONIDE 250 MCG: 0.25 INHALANT RESPIRATORY (INHALATION) at 07:59

## 2021-01-01 RX ADMIN — Medication 250 MCG/HR: at 14:52

## 2021-01-01 RX ADMIN — ENOXAPARIN SODIUM 150 MG: 150 INJECTION SUBCUTANEOUS at 09:53

## 2021-01-01 RX ADMIN — Medication 1 AMPULE: at 22:11

## 2021-01-01 RX ADMIN — ROCURONIUM BROMIDE 50 MG: 50 INJECTION, SOLUTION INTRAVENOUS at 11:47

## 2021-01-01 RX ADMIN — MINERAL OIL, PETROLATUM: 425; 568 OINTMENT OPHTHALMIC at 20:17

## 2021-01-01 RX ADMIN — Medication 40 ML: at 13:02

## 2021-01-01 RX ADMIN — CASTOR OIL AND BALSAM, PERU: 788; 87 OINTMENT TOPICAL at 08:52

## 2021-01-01 RX ADMIN — CLONAZEPAM 2 MG: 0.5 TABLET ORAL at 15:59

## 2021-01-01 RX ADMIN — CASTOR OIL AND BALSAM, PERU: 788; 87 OINTMENT TOPICAL at 18:02

## 2021-01-01 RX ADMIN — EPOPROSTENOL 30 NG/KG/MIN: 1.5 INJECTION, POWDER, LYOPHILIZED, FOR SOLUTION INTRAVENOUS at 18:28

## 2021-01-01 RX ADMIN — CHLORHEXIDINE GLUCONATE 15 ML: 0.12 RINSE ORAL at 08:06

## 2021-01-01 RX ADMIN — Medication 1 AMPULE: at 08:00

## 2021-01-01 RX ADMIN — MIDAZOLAM 15 MG/HR: 5 INJECTION INTRAMUSCULAR; INTRAVENOUS at 04:40

## 2021-01-01 RX ADMIN — Medication 250 MCG/HR: at 12:26

## 2021-01-01 RX ADMIN — DOCUSATE SODIUM 50MG AND SENNOSIDES 8.6MG 1 TABLET: 8.6; 5 TABLET, FILM COATED ORAL at 21:40

## 2021-01-01 RX ADMIN — LORAZEPAM 4 MG: 2 INJECTION INTRAMUSCULAR; INTRAVENOUS at 01:06

## 2021-01-01 RX ADMIN — FUROSEMIDE 40 MG: 10 INJECTION, SOLUTION INTRAMUSCULAR; INTRAVENOUS at 17:21

## 2021-01-01 RX ADMIN — CEFTRIAXONE SODIUM 2 G: 2 INJECTION, POWDER, FOR SOLUTION INTRAMUSCULAR; INTRAVENOUS at 01:55

## 2021-01-01 RX ADMIN — MINERAL OIL, PETROLATUM: 425; 568 OINTMENT OPHTHALMIC at 10:12

## 2021-01-01 RX ADMIN — CEFEPIME HYDROCHLORIDE 2 G: 2 INJECTION, POWDER, FOR SOLUTION INTRAVENOUS at 08:29

## 2021-01-01 RX ADMIN — Medication: at 21:38

## 2021-01-01 RX ADMIN — DOCUSATE SODIUM 50MG AND SENNOSIDES 8.6MG 1 TABLET: 8.6; 5 TABLET, FILM COATED ORAL at 08:45

## 2021-01-01 RX ADMIN — ROCURONIUM BROMIDE 50 MG: 50 INJECTION INTRAVENOUS at 03:26

## 2021-01-01 RX ADMIN — Medication 300 MCG/HR: at 03:36

## 2021-01-01 RX ADMIN — MINERAL OIL, PETROLATUM: 425; 568 OINTMENT OPHTHALMIC at 08:35

## 2021-01-01 RX ADMIN — Medication 100 MG: at 02:40

## 2021-01-01 RX ADMIN — FUROSEMIDE 60 MG: 10 INJECTION, SOLUTION INTRAMUSCULAR; INTRAVENOUS at 13:05

## 2021-01-01 RX ADMIN — IPRATROPIUM BROMIDE AND ALBUTEROL SULFATE 3 ML: .5; 3 SOLUTION RESPIRATORY (INHALATION) at 06:23

## 2021-01-01 RX ADMIN — SODIUM CHLORIDE 40 MG: 9 INJECTION, SOLUTION INTRAMUSCULAR; INTRAVENOUS; SUBCUTANEOUS at 09:14

## 2021-01-01 RX ADMIN — PROPOFOL 50 MCG/KG/MIN: 10 INJECTION, EMULSION INTRAVENOUS at 10:47

## 2021-01-01 RX ADMIN — Medication 1 AMPULE: at 20:24

## 2021-01-01 RX ADMIN — ENOXAPARIN SODIUM 40 MG: 40 INJECTION SUBCUTANEOUS at 20:09

## 2021-01-01 RX ADMIN — DOCUSATE SODIUM 50MG AND SENNOSIDES 8.6MG 1 TABLET: 8.6; 5 TABLET, FILM COATED ORAL at 08:23

## 2021-01-01 RX ADMIN — PROPOFOL 20 MCG/KG/MIN: 10 INJECTION, EMULSION INTRAVENOUS at 20:16

## 2021-01-01 RX ADMIN — Medication 20 ML: at 22:13

## 2021-01-01 RX ADMIN — FUROSEMIDE 40 MG: 10 INJECTION, SOLUTION INTRAMUSCULAR; INTRAVENOUS at 18:18

## 2021-01-01 RX ADMIN — SODIUM CHLORIDE, SODIUM LACTATE, POTASSIUM CHLORIDE, AND CALCIUM CHLORIDE 75 ML/HR: 600; 310; 30; 20 INJECTION, SOLUTION INTRAVENOUS at 15:28

## 2021-01-01 RX ADMIN — DEXMEDETOMIDINE HYDROCHLORIDE 1.2 MCG/KG/HR: 100 INJECTION, SOLUTION, CONCENTRATE INTRAVENOUS at 00:47

## 2021-01-01 RX ADMIN — SODIUM CHLORIDE, SODIUM LACTATE, POTASSIUM CHLORIDE, AND CALCIUM CHLORIDE 75 ML/HR: 600; 310; 30; 20 INJECTION, SOLUTION INTRAVENOUS at 01:54

## 2021-01-01 RX ADMIN — MIDAZOLAM 15 MG/HR: 5 INJECTION INTRAMUSCULAR; INTRAVENOUS at 11:20

## 2021-01-01 RX ADMIN — LORAZEPAM 4 MG: 2 INJECTION INTRAMUSCULAR; INTRAVENOUS at 15:14

## 2021-01-01 RX ADMIN — Medication 4 MCG/MIN: at 07:55

## 2021-01-01 RX ADMIN — OXYCODONE HYDROCHLORIDE 30 MG: 5 SOLUTION ORAL at 13:20

## 2021-01-01 RX ADMIN — DEXMEDETOMIDINE HYDROCHLORIDE 1.2 MCG/KG/HR: 100 INJECTION, SOLUTION, CONCENTRATE INTRAVENOUS at 16:57

## 2021-01-01 RX ADMIN — ENOXAPARIN SODIUM 40 MG: 40 INJECTION SUBCUTANEOUS at 09:43

## 2021-01-01 RX ADMIN — SODIUM CHLORIDE 0.5 MCG/KG/HR: 9 INJECTION, SOLUTION INTRAVENOUS at 11:36

## 2021-01-01 RX ADMIN — Medication 300 MCG/HR: at 00:50

## 2021-01-01 RX ADMIN — PROPOFOL 50 MCG/KG/MIN: 10 INJECTION, EMULSION INTRAVENOUS at 17:24

## 2021-01-01 RX ADMIN — CISATRACURIUM BESYLATE 3 MCG/KG/MIN: 10 INJECTION INTRAVENOUS at 01:31

## 2021-01-01 RX ADMIN — ENOXAPARIN SODIUM 40 MG: 40 INJECTION SUBCUTANEOUS at 22:24

## 2021-01-01 RX ADMIN — ENOXAPARIN SODIUM 40 MG: 40 INJECTION SUBCUTANEOUS at 10:42

## 2021-01-01 RX ADMIN — OXYCODONE HYDROCHLORIDE 30 MG: 5 SOLUTION ORAL at 06:04

## 2021-01-01 RX ADMIN — DOCUSATE SODIUM 50MG AND SENNOSIDES 8.6MG 1 TABLET: 8.6; 5 TABLET, FILM COATED ORAL at 08:00

## 2021-01-01 RX ADMIN — Medication 200 MCG/HR: at 09:53

## 2021-01-01 RX ADMIN — ENOXAPARIN SODIUM 100 MG: 100 INJECTION SUBCUTANEOUS at 12:00

## 2021-01-01 RX ADMIN — FLUCONAZOLE 400 MG: 400 INJECTION, SOLUTION INTRAVENOUS at 09:32

## 2021-01-01 RX ADMIN — ENOXAPARIN SODIUM 40 MG: 40 INJECTION SUBCUTANEOUS at 11:38

## 2021-01-01 RX ADMIN — OXYCODONE HYDROCHLORIDE AND ACETAMINOPHEN 1000 MG: 500 TABLET ORAL at 10:04

## 2021-01-01 RX ADMIN — ENOXAPARIN SODIUM 150 MG: 150 INJECTION SUBCUTANEOUS at 20:14

## 2021-01-01 RX ADMIN — Medication 200 MCG/HR: at 01:12

## 2021-01-01 RX ADMIN — Medication 10 ML: at 21:08

## 2021-01-01 RX ADMIN — CLONAZEPAM 2 MG: 0.5 TABLET ORAL at 17:31

## 2021-01-01 RX ADMIN — ENOXAPARIN SODIUM 150 MG: 150 INJECTION SUBCUTANEOUS at 08:37

## 2021-01-01 RX ADMIN — CASTOR OIL AND BALSAM, PERU: 788; 87 OINTMENT TOPICAL at 17:11

## 2021-01-01 RX ADMIN — DEXMEDETOMIDINE HYDROCHLORIDE 1.2 MCG/KG/HR: 100 INJECTION, SOLUTION, CONCENTRATE INTRAVENOUS at 03:00

## 2021-01-01 RX ADMIN — CASTOR OIL AND BALSAM, PERU: 788; 87 OINTMENT TOPICAL at 16:48

## 2021-01-01 RX ADMIN — INSULIN LISPRO 2 UNITS: 100 INJECTION, SOLUTION INTRAVENOUS; SUBCUTANEOUS at 14:11

## 2021-01-01 RX ADMIN — SODIUM CHLORIDE, SODIUM LACTATE, POTASSIUM CHLORIDE, AND CALCIUM CHLORIDE 75 ML/HR: 600; 310; 30; 20 INJECTION, SOLUTION INTRAVENOUS at 17:28

## 2021-01-01 RX ADMIN — DEXMEDETOMIDINE HYDROCHLORIDE 1.5 MCG/KG/HR: 100 INJECTION, SOLUTION, CONCENTRATE INTRAVENOUS at 15:15

## 2021-01-01 RX ADMIN — KETAMINE HYDROCHLORIDE 0.2 MG/KG/HR: 50 INJECTION, SOLUTION INTRAMUSCULAR; INTRAVENOUS at 15:15

## 2021-01-01 RX ADMIN — BUDESONIDE 250 MCG: 0.25 INHALANT RESPIRATORY (INHALATION) at 21:00

## 2021-01-01 RX ADMIN — BUDESONIDE 250 MCG: 0.25 INHALANT RESPIRATORY (INHALATION) at 20:08

## 2021-01-01 RX ADMIN — DEXAMETHASONE SODIUM PHOSPHATE 20 MG: 4 INJECTION, SOLUTION INTRAMUSCULAR; INTRAVENOUS at 09:54

## 2021-01-01 RX ADMIN — CEFEPIME HYDROCHLORIDE 2 G: 2 INJECTION, POWDER, FOR SOLUTION INTRAVENOUS at 08:26

## 2021-01-01 RX ADMIN — OXYCODONE HYDROCHLORIDE 30 MG: 5 SOLUTION ORAL at 08:39

## 2021-01-01 RX ADMIN — DEXAMETHASONE SODIUM PHOSPHATE 14 MG: 4 INJECTION, SOLUTION INTRAMUSCULAR; INTRAVENOUS at 10:12

## 2021-01-01 RX ADMIN — FUROSEMIDE 40 MG: 10 INJECTION, SOLUTION INTRAMUSCULAR; INTRAVENOUS at 09:42

## 2021-01-01 RX ADMIN — CEFEPIME HYDROCHLORIDE 2 G: 2 INJECTION, POWDER, FOR SOLUTION INTRAVENOUS at 15:08

## 2021-01-01 RX ADMIN — DEXMEDETOMIDINE HYDROCHLORIDE 1.2 MCG/KG/HR: 100 INJECTION, SOLUTION, CONCENTRATE INTRAVENOUS at 19:39

## 2021-01-01 RX ADMIN — SODIUM CHLORIDE, SODIUM LACTATE, POTASSIUM CHLORIDE, AND CALCIUM CHLORIDE 75 ML/HR: 600; 310; 30; 20 INJECTION, SOLUTION INTRAVENOUS at 20:04

## 2021-01-01 RX ADMIN — CHLORHEXIDINE GLUCONATE 15 ML: 0.12 RINSE ORAL at 21:10

## 2021-01-01 RX ADMIN — FLUCONAZOLE 400 MG: 400 INJECTION, SOLUTION INTRAVENOUS at 12:01

## 2021-01-01 RX ADMIN — PROPOFOL 50 MCG/KG/MIN: 10 INJECTION, EMULSION INTRAVENOUS at 09:12

## 2021-01-01 RX ADMIN — PROPOFOL 50 MCG/KG/MIN: 10 INJECTION, EMULSION INTRAVENOUS at 03:20

## 2021-01-01 RX ADMIN — FUROSEMIDE 40 MG: 10 INJECTION, SOLUTION INTRAMUSCULAR; INTRAVENOUS at 17:09

## 2021-01-01 RX ADMIN — METRONIDAZOLE 500 MG: 500 INJECTION, SOLUTION INTRAVENOUS at 03:58

## 2021-01-01 RX ADMIN — DEXMEDETOMIDINE HYDROCHLORIDE 1.2 MCG/KG/HR: 100 INJECTION, SOLUTION, CONCENTRATE INTRAVENOUS at 11:32

## 2021-01-01 RX ADMIN — Medication 300 MCG/HR: at 18:23

## 2021-01-01 RX ADMIN — INSULIN LISPRO 5 UNITS: 100 INJECTION, SOLUTION INTRAVENOUS; SUBCUTANEOUS at 23:35

## 2021-01-01 RX ADMIN — METRONIDAZOLE 500 MG: 500 INJECTION, SOLUTION INTRAVENOUS at 03:40

## 2021-01-01 RX ADMIN — Medication 300 MCG/HR: at 16:50

## 2021-01-01 RX ADMIN — Medication 250 MCG/HR: at 22:33

## 2021-01-01 RX ADMIN — OXYCODONE HYDROCHLORIDE 30 MG: 5 SOLUTION ORAL at 22:22

## 2021-01-01 RX ADMIN — METRONIDAZOLE 500 MG: 500 INJECTION, SOLUTION INTRAVENOUS at 16:50

## 2021-01-01 RX ADMIN — BUDESONIDE 250 MCG: 0.25 INHALANT RESPIRATORY (INHALATION) at 08:13

## 2021-01-01 RX ADMIN — Medication 250 MCG/HR: at 19:51

## 2021-01-01 RX ADMIN — Medication 300 MCG/HR: at 15:00

## 2021-01-01 RX ADMIN — Medication 300 MCG/HR: at 07:25

## 2021-01-01 RX ADMIN — PROPOFOL 50 MCG/KG/MIN: 10 INJECTION, EMULSION INTRAVENOUS at 13:23

## 2021-01-01 RX ADMIN — DEXMEDETOMIDINE HYDROCHLORIDE 1.2 MCG/KG/HR: 100 INJECTION, SOLUTION, CONCENTRATE INTRAVENOUS at 14:35

## 2021-01-01 RX ADMIN — CLONAZEPAM 2 MG: 0.5 TABLET ORAL at 16:17

## 2021-01-01 RX ADMIN — ROCURONIUM BROMIDE 50 MG: 50 INJECTION, SOLUTION INTRAVENOUS at 13:37

## 2021-01-01 RX ADMIN — INSULIN LISPRO 2 UNITS: 100 INJECTION, SOLUTION INTRAVENOUS; SUBCUTANEOUS at 12:54

## 2021-01-01 RX ADMIN — EPOPROSTENOL 30 NG/KG/MIN: 1.5 INJECTION, POWDER, LYOPHILIZED, FOR SOLUTION INTRAVENOUS at 15:44

## 2021-01-29 NOTE — PROGRESS NOTES
Pt is here for Chief Complaint Patient presents with  Follow-up BMI, BP, Asthma  Well Woman  
  pap Pt denies pain at this time 1. Have you been to the ER, urgent care clinic since your last visit? Hospitalized since your last visit? No 
 
2. Have you seen or consulted any other health care providers outside of the [address] [place] [address] since your last visit? Include any pap smears or colon screening.  No

## 2021-01-29 NOTE — PATIENT INSTRUCTIONS
Learning About Breast Cancer Screening What is breast cancer screening? Breast cancer occurs when cells that are not normal grow in one or both of your breasts. Screening tests can help find breast cancer early. Cancer is easier to treat when it's found early. Having concerns about breast cancer is common. That's why it's important to talk with your doctor about when to start and how often to get screened for breast cancer. How is breast cancer screening done? Several screening tests can be used to check for breast cancer. Mammograms. These tests check for signs of cancer using X-rays. They can show tumors that are too small for you or your doctor to feel. During a mammogram, a machine squeezes your breasts to make them flatter and easier to X-ray. At least two pictures are taken of each breast. One is taken from the top and one from the side. 3-D mammograms. These tests are also called digital breast tomosynthesis. Your breast is positioned on a flat plate. A top plate is pressed against your breast to keep it in position. The X-ray arm then moves in an arc above the breast and takes many pictures. A computer uses these X-rays to create a three-dimensional image. Clinical breast exam.  
In this exam, your doctor carefully feels your breasts and under your arms to check for lumps or other changes. Who should be screened for breast cancer? Experts agree that mammograms are the best screening test for people at average risk of breast cancer. But they don't all agree on the age at which screening should start. And they don't agree on whether it's better to be screened every year or every two years. Here are some of the recommendations from experts: 
· Start by age 36 and have a mammogram each year. · Start at age 39 and have a mammogram each year. · Start at age 48 and have a mammogram every 2 years. When to stop having mammograms is another decision. You and your doctor can decide on the right age to start and stop screening based on your personal preferences and overall health. 
What is your risk for breast cancer? 
If you don't already know your risk of breast cancer, you can ask your doctor about it. You can also look it up at www.cancer.gov/bcrisktool/. 
If your doctor says that you have a high or very high risk, ask about ways to reduce your risk. These could include getting extra screening, taking medicine, or having surgery. If you have a strong family history of breast cancer, ask your doctor about genetic testing. 
What steps can you take to stay healthy? 
Some things that increase your risk of breast cancer, such as your age and being female, cannot be controlled. But you can do some things to stay as healthy as you can. 
· Learn what your breasts normally look and feel like. If you notice any changes, tell your doctor. 
· If you drink alcohol, limit how much you drink. Any amount of alcohol may increase your risk for some types of cancer. 
· If you smoke, quit. When you quit smoking, you lower your chances of getting many types of cancer. 
You can also do your best to eat well, be active, and stay at a healthy weight. Eating healthy foods and being active every day, as well as staying at a healthy weight, may help prevent cancer. 
Where can you learn more? 
Go to https://www.healthEvoleen.net/GoodHelpConnections 
Enter H706 in the search box to learn more about \"Learning About Breast Cancer Screening.\" 
Current as of: April 29, 2020               Content Version: 12.6 
© 7119-1737 [employer].  
 Care instructions adapted under license by [employer] (which disclaims liability or warranty for this information). If you have questions about a medical condition or this instruction, always ask your healthcare professional. [address] any warranty or liability for your use of this information. Mammogram: About This Test 
What is it? A mammogram is an X-ray of the breast that is used to screen for breast cancer. This test can find tumors that are too small for you or your doctor to feel. Cancer is most easily treated when it is found at an early stage. Why is this test done? A mammogram is done to: 
· Look for breast cancer in women who don't have symptoms. · Find breast cancer in women who have symptoms. Symptoms of breast cancer may include a lump or thickening in the breast, nipple discharge, or dimpling of the skin on one area of the breast. 
· Find an area of suspicious breast tissue to remove for an exam under a microscope (biopsy). How do you prepare for the test? 
If you've had a mammogram before at another clinic, have the results sent or bring them with you to your appointment. On the day of the mammogram, don't use any deodorant. And don't use perfume, powders, or ointments near or on your breasts. The residue left on your skin by these substances may interfere with the X-rays. How is the test done? · You will need to take off any jewelry that might interfere with the X-ray pictures. · You will need to take off your clothes above the waist. 
· You will be given a cloth or paper gown to use during the test. 
· You probably will stand during the mammogram. 
· One at a time, your breasts will be placed on a flat plate. · Another plate is then pressed firmly against your breast to help flatten out the breast tissue. You may be asked to lift your arm. · For a few seconds while the X-ray picture is being taken, you will need to hold your breath. · At least two pictures are taken of each breast. One is taken from the top and one from the side. How does having a mammogram feel? A mammogram is often uncomfortable but rarely painful. If you have sensitive or fragile skin or a skin condition, let the technician know before you have your exam. If you have menstrual periods, the procedure is more comfortable when done within 2 weeks after your period has ended. Having your breasts flattened is usually uncomfortable, but it helps the technician get the best images. How long does the test take? · The test will take about 10 to 15 minutes. You may be in the clinic for up to an hour. · You may be asked to wait a few minutes while the images are checked to make sure they don't need to be redone. What happens after the test? 
· You will probably be able to go home right away. · You can go back to your usual activities right away. Follow-up care is a key part of your treatment and safety. Be sure to make and go to all appointments, and call your doctor if you are having problems. It's also a good idea to keep a list of the medicines you take. Ask your doctor when you can expect to have your test results. Where can you learn more? Go to http://www.gray.com/ Enter G882 in the search box to learn more about \"Mammogram: About This Test.\" Current as of: April 29, 2020               Content Version: 12.6 © 1334-9741 [employer]. Care instructions adapted under license by "[employer] (which disclaims liability or warranty for this information). If you have questions about a medical condition or this instruction, always ask your healthcare professional. [address] any warranty or liability for your use of this information.
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
## 2021-02-03 NOTE — PROGRESS NOTES
You will NOT need another cervical cancer screening PAP for 3 years, however a yearly well woman visit is advised. You have BACTERAL VAGINOSIS (BV), I have sent medicine to the pharmacy to treat this called FLAGYL. This occurs when you have an imbalance in the pH balance of your vagina. Avoid douching, baths, and perfumed lotions/soaps. Also your period and sex can affect this balance. Eating some yogurt with probiotics or taking them in pill form may help reduce the recurrence if you get this ALL the time.

## 2021-07-30 PROBLEM — J45.40 MODERATE PERSISTENT ASTHMA WITHOUT COMPLICATION: Status: ACTIVE | Noted: 2021-01-01

## 2021-07-30 NOTE — PROGRESS NOTES
Pt is here for   Chief Complaint   Patient presents with    Follow-up     BMI, Asthma      1. Have you been to the ER, urgent care clinic since your last visit? Hospitalized since your last visit? No    2. Have you seen or consulted any other health care providers outside of the [address] [place] [address] since your last visit? Include any pap smears or colon screening.  No     Denies pain at this time

## 2021-07-30 NOTE — PROGRESS NOTES
Inderjit Wan (: 1985) is a 29 y.o. female, established patient, here for evaluation of the following chief complaint(s):  Follow-up (BMI, Asthma )       ASSESSMENT/PLAN:  Below is the assessment and plan developed based on review of pertinent history, physical exam, labs, studies, and medications. 1. Moderate persistent asthma without complication  -     ProAir HFA 90 mcg/actuation inhaler; Take 2 Puffs by inhalation every four (4) hours as needed for Wheezing, Shortness of Breath or Cough., Normal, Disp-1 Inhaler, R-0, MANJULA  -     fluticasone propionate (FLOVENT HFA) 110 mcg/actuation inhaler; Take 1 Puff by inhalation every twelve (12) hours. , Normal, Disp-1 Inhaler, R-5  -     montelukast (SINGULAIR) 10 mg tablet; Take 1 Tablet by mouth daily. , Normal, Disp-30 Tablet, R-5  2. Severe obesity (BMI >= 40) ([facility])  3. GERD without esophagitis  -     famotidine (PEPCID) 20 mg tablet; Take 1 Tablet by mouth two (2) times a day., Normal, Disp-28 Tablet, R-0  4. Spondylosis of thoracic region without myelopathy or radiculopathy      Return in about 4 weeks (around 2021) for OV- Flovent start, BMI/Trainer f/u, GERD. Pt asked to complete follow by next visit: lose weight, increase physical activity, Zynex requested. SUBJECTIVE/OBJECTIVE:  HPI    Pt presents to f/u BMI & Asthma. Taking Albuterol BID, no steroid inhaler or singulair use. Denies side effects from medication. Feels good, recently started with  for past 2 sessions. Increasing steps also and using ash to encourage more movement. Snacking more at work with healthy snacks and one small meal.    CONTINUES with LBP. Lifting at work with back brace use at time.      Review of Systems  Constitutional: negative for fevers, chills, anorexia and weight loss  Respiratory:  negative for  hemoptysis  CV:   negative for chest pain, palpitations, and lower extremity edema  GI:   negative for nausea, vomiting, diarrhea, abdominal pain, and melena  Endo:               negative for polyuria,polydipsia,polyphagia, and heat intolerance  Genitourinary: negative for frequency, urgency, dysuria, retention, and hematuria  Integument:  negative for rash, ulcerations, and pruritus  Hematologic:  negative for easy bruising and bleeding  Musculoskel: negative for muscle weakness,and joint pain/swelling  Neurological:  negative for headaches, dizziness, vertigo,and memory/gait problems  Behavl/Psych: negative for feelings of anxiety, depression, suicide, and mood changes    Visit Vitals  /88 (BP 1 Location: Left upper arm, BP Patient Position: Sitting, BP Cuff Size: Large adult)   Pulse 82   Temp 97.4 °F (36.3 °C) (Oral)   Resp 17   Ht 5' 5\" (1.651 m)   Wt 316 lb (143.3 kg)   SpO2 97%   BMI 52.59 kg/m²       Wt Readings from Last 3 Encounters:   07/30/21 316 lb (143.3 kg)   01/29/21 318 lb (144.2 kg)   10/29/20 320 lb (145.2 kg)         Physical Exam:   General appearance - alert, well appearing, and in no distress. Mental status - A/O x 4,normal mood and affect. Chest - CTA. Symmetric chest rise. No wheezing. No distress. Heart - Normal rate. Abdomen- Soft, round. Non-distended, NT. No pulsatile masses or hernias. Ext-  No pedal edema, clubbing, or cyanosis. Skin-Warm and dry. No hyperpigmentation, ulcerations, or suspicious lesions. Neuro - Normal speech, no focal findings or movement disorder. Normal strength, gait, and muscle tone. An electronic signature was used to authenticate this note.   -- Sandeep Gonzales NP

## 2021-07-30 NOTE — PATIENT INSTRUCTIONS
Fluticasone (By breathing)   Fluticasone (heei-SLG-i-sone)  Prevents asthma attacks. This medicine is a corticosteroid. Brand Name(s): Flovent HFA   There may be other brand names for this medicine. When This Medicine Should Not Be Used: This medicine is not right for everyone. Do not use it if you had an allergic reaction to fluticasone or while you are having an asthma attack. Do not use ArmonAir RespiClick®, Arnuity Ellipta®, or Flovent® Diskus® if you are allergic to milk proteins. How to Use This Medicine:   Liquid Under Pressure, Powder Under Pressure, Disk  · Take your medicine as directed. Your dose may need to be changed several times to find what works best for you. Never use more medicine than your doctor prescribed. · Read and follow the patient instructions that come with this medicine. Talk to your doctor or pharmacist if you have any questions. Your doctor or pharmacist should also show you how to use the inhaler. · Do not breathe into the inhaler. To inhale this medicine, breathe out fully, trying to get as much air out of the lungs as possible. Put the mouthpiece just in front of your mouth with the canister upright. · Open your mouth and breathe in slowly and deeply (like yawning), and at the same time firmly press down on the top of the canister once. · Hold your breath for about 5 to 10 seconds, and then breathe out slowly. · When you have finished all your inhalations, rinse your mouth out with water. Do not swallow the water. · Arnuity Ellipta®:   ¨ Do not shake the inhaler. When you are ready to use the medicine, open the cover of the inhaler. You should hear a click, and the dose counter number will change. This means the medicine is ready. ¨ The dose counter will turn red when the inhaler has fewer than 10 doses.  Stop using the inhaler when the counter reaches 0, or 6 weeks after you open the package, whichever comes first.  · Flovent® HFA:   ¨ Before you use the inhaler for the first time, point it away from your face and test spray into the air 4 times. Shake the inhaler before each spray. Test spray 1 time if the inhaler has not been used for 7 days or if it has been dropped. ¨ When the dose counter reaches 020, you will need a refill soon. Stop using the inhaler when it reaches 0.  · Flovent® Diskus®:   ¨ To use the inhaler, hold it level and push the lever away from you. You should hear a click and see the mouthpiece. The dose counter number should change. ¨ When you are finished, slide the lever back into place until it clicks. The dose counter will turn red when you have 5 or fewer doses left. ¨ Do not use a spacer device with the inhaler. Do not wash the inhaler. · ArmonAir RespiClick®:   ¨ This medicine does not require priming. Do not use it with a spacer or volume holding chamber. ¨ When you are ready to use the medicine, open the cover of the inhaler. You should hear a click, and the dose counter number will change. This means the medicine is ready. ¨ The dose counter will turn red when the inhaler has fewer than 20 doses. Stop using the inhaler when the counter reaches 0, or 30 days after you open the package, whichever comes first.  · Missed dose: Take a dose as soon as you remember. If it is almost time for your next dose, wait until then and take a regular dose. Do not take extra medicine to make up for a missed dose. Do not use more than 1 dose of Arnuity Ellipta® in 1 day. · If you miss a dose of ArmonAir RespiClick®, skip the missed dose and go back to your regular dosing schedule. Do not double doses. · Store the canister at room temperature, away from heat and direct light. Do not freeze. Do not keep this medicine inside a car where it could be exposed to extreme heat or cold. Do not poke holes in the canister or throw it into a fire, even if the canister is empty. Store the medicine at room temperature in a dry place, away from heat, moisture, or light. Store the [employer] inhaler with the mouthpiece down. Drugs and Foods to Avoid:   Ask your doctor or pharmacist before using any other medicine, including over-the-counter medicines, vitamins, and herbal products. · Some medicines can affect how fluticasone works. Tell your doctor if you are using any of the following:  ¨ Atazanavir, clarithromycin, conivaptan, indinavir, itraconazole, ketoconazole, lopinavir, nefazodone, nelfinavir, ritonavir, saquinavir, telithromycin, troleandomycin, or voriconazole  ¨ Medicine to treat seizures  Warnings While Using This Medicine:   · Tell your doctor if you are pregnant or breastfeeding, or if you have liver disease, osteoporosis, cataracts, or glaucoma. Tell your doctor if you have any immune system problems or infections, including herpes simplex in your eye or tuberculosis. Tell your doctor right away if you are exposed to measles or chickenpox. · This medicine may cause the following problems:  ¨ Increased trouble breathing right after use (paradoxical bronchospasm)  ¨ Low bone mineral density, which may lead to osteoporosis  ¨ Cataracts, glaucoma, or other vision problems  ¨ Slow growth in children  ¨ Problems with the adrenal glands  ¨ Higher risk of infection, including fungus infection in the mouth (thrush)  · This medicine will not stop an asthma attack that has already started. You should have another medicine to use in case of an acute asthma attack. · If any of your asthma medicines do not seem to be working as well as usual, call your doctor right away. Do not change your doses or stop using your medicines without asking your doctor. · Tell any doctor or dentist who treats you that you are using this medicine. · Your doctor will check your progress and the effects of this medicine at regular visits. Keep all appointments. · You may need to use this medicine for 1 to 2 weeks before your asthma starts to get better.  Call your doctor if your symptoms do not improve or if they get worse. · Keep all medicine out of the reach of children. Never share your medicine with anyone. Possible Side Effects While Using This Medicine:   Call your doctor right away if you notice any of these side effects:  · Allergic reaction: Itching or hives, swelling in your face or hands, swelling or tingling in your mouth or throat, chest tightness, trouble breathing  · Color changes on the skin, dark freckles, easy bruising, muscle weakness, round or puffy face  · Eye pain or vision changes  · Fever, chills, cough, runny or stuffy nose, sore throat, body aches  · Tiredness, weakness, nausea and vomiting, dizziness  · Worsening of breathing problems  If you notice these less serious side effects, talk with your doctor:   · Headache  · Sores or white patches in your mouth or throat, pain when eating or swallowing  · Weight changes (in children)  If you notice other side effects that you think are caused by this medicine, tell your doctor. Call your doctor for medical advice about side effects. You may report side effects to FDA at 6-690-FDA-2960  © 2017 [facility]– [place][note 2] Information is for End User's use only and may not be sold, redistributed or otherwise used for commercial purposes. The above information is an  only. It is not intended as medical advice for individual conditions or treatments. Talk to your doctor, nurse or pharmacist before following any medical regimen to see if it is safe and effective for you. Use OTC PEPCID (twice daily) or Prilosec (once daily) for the next 2 weeks and avoid acidic, spicy, greasy, tomato-based, and dairy foods. Avoid NSAIDs- Ibuprofen, Motrin, Advil, and Aleve. Try Tylenol or heat for pain. Gastroesophageal Reflux Disease (GERD): Care Instructions  Overview     Gastroesophageal reflux disease (GERD) is the backward flow of stomach acid into the esophagus.  The esophagus is the tube that leads from your throat to your stomach. A one-way valve prevents the stomach acid from backing up into this tube. But when you have GERD, this valve does not close tightly enough. This can also cause pain and swelling in your esophagus. (This is called esophagitis.)  If you have mild GERD symptoms including heartburn, you may be able to control the problem with antacids or over-the-counter medicine. You can also make lifestyle changes to help reduce your symptoms. These include changing your diet and eating habits, such as not eating late at night and losing weight. Follow-up care is a key part of your treatment and safety. Be sure to make and go to all appointments, and call your doctor if you are having problems. It's also a good idea to know your test results and keep a list of the medicines you take. How can you care for yourself at home? · Take your medicines exactly as prescribed. Call your doctor if you think you are having a problem with your medicine. · Your doctor may recommend over-the-counter medicine. For mild or occasional indigestion, antacids, such as Tums, Gaviscon, Mylanta, or Maalox, may help. Your doctor also may recommend over-the-counter acid reducers, such as famotidine (Pepcid AC), cimetidine (Tagamet HB), or omeprazole (Prilosec). Read and follow all instructions on the label. If you use these medicines often, talk with your doctor. · Change your eating habits. ? It's best to eat several small meals instead of two or three large meals. ? After you eat, wait 2 to 3 hours before you lie down. ? Chocolate, mint, and alcohol can make GERD worse. ? Spicy foods, foods that have a lot of acid (like tomatoes and oranges), and coffee can make GERD symptoms worse in some people. If your symptoms are worse after you eat a certain food, you may want to stop eating that food to see if your symptoms get better. · Do not smoke or chew tobacco. Smoking can make GERD worse.  If you need help quitting, talk to your doctor about stop-smoking programs and medicines. These can increase your chances of quitting for good. · If you have GERD symptoms at night, raise the head of your bed 6 to 8 inches by putting the frame on blocks or placing a foam wedge under the head of your mattress. (Adding extra pillows does not work.)  · Do not wear tight clothing around your middle. · Lose weight if you need to. Losing just 5 to 10 pounds can help. When should you call for help? Call your doctor now or seek immediate medical care if:    · You have new or different belly pain.     · Your stools are black and tarlike or have streaks of blood. Watch closely for changes in your health, and be sure to contact your doctor if:    · Your symptoms have not improved after 2 days.     · Food seems to catch in your throat or chest.   Where can you learn more? Go to http://www.gray.com/  Enter L289 in the search box to learn more about \"Gastroesophageal Reflux Disease (GERD): Care Instructions. \"  Current as of: April 15, 2020               Content Version: 12.8  © 2126-1331 [employer]. Care instructions adapted under license by [employer] (which disclaims liability or warranty for this information). If you have questions about a medical condition or this instruction, always ask your healthcare professional. [address] any warranty or liability for your use of this information.

## 2021-08-12 PROBLEM — U07.1 COVID-19: Status: ACTIVE | Noted: 2021-01-01

## 2021-08-12 NOTE — ED PROVIDER NOTES
81-year-old female with a history of asthma presents with Covid symptoms since Saturday including shortness of breath, nonproductive cough, sneezing, nausea, vomiting, body aches. Was seen at [facility] yesterday for the same symptoms and diagnosed with Covid. She was sent home where her breathing worsened over the last 24 hours. Reports fever. Known sick contacts-- several family members with Covid. Patient has tried using her inhaler without significant relief. Arrives hypoxic with sats 88% on room air           No past medical history on file. No past surgical history on file. No family history on file. Social History     Socioeconomic History    Marital status: SINGLE     Spouse name: Not on file    Number of children: Not on file    Years of education: Not on file    Highest education level: Not on file   Occupational History    Not on file   Tobacco Use    Smoking status: Never Smoker    Smokeless tobacco: Never Used   Vaping Use    Vaping Use: Never used   Substance and Sexual Activity    Alcohol use: Yes     Comment: occasionally    Drug use: No    Sexual activity: Not Currently   Other Topics Concern    Not on file   Social History Narrative    Not on file     Social Determinants of Health     Financial Resource Strain:     Difficulty of Paying Living Expenses:    Food Insecurity:     Worried About Running Out of Food in the Last Year:     [address] in the Last Year:    Transportation Needs:     Lack of Transportation (Medical):      Lack of Transportation (Non-Medical):    Physical Activity:     Days of Exercise per Week:     Minutes of Exercise per Session:    Stress:     Feeling of Stress :    Social Connections:     Frequency of Communication with Friends and Family:     Frequency of Social Gatherings with Friends and Family:     Attends [employer] Services:     Active Member of Clubs or Organizations:     Attends Club or Organization Meetings:     Marital Status: Intimate Partner Violence:     Fear of Current or Ex-Partner:     Emotionally Abused:     Physically Abused:     Sexually Abused: ALLERGIES: Lactose and Pcn [penicillins]    Review of Systems   Constitutional: Positive for fever. Negative for chills and unexpected weight change. HENT: Positive for congestion. Negative for trouble swallowing. Eyes: Negative for discharge. Respiratory: Positive for cough and shortness of breath. Negative for chest tightness. Cardiovascular: Negative. Negative for chest pain. Gastrointestinal: Positive for nausea and vomiting. Negative for abdominal distention, abdominal pain, constipation and diarrhea. Endocrine: Negative. Genitourinary: Negative. Negative for difficulty urinating, dysuria, frequency and urgency. Musculoskeletal: Positive for myalgias. Negative for arthralgias. Skin: Negative. Negative for color change. Allergic/Immunologic: Negative. Neurological: Negative. Negative for dizziness, speech difficulty and headaches. Hematological: Negative. Psychiatric/Behavioral: Negative. Negative for agitation and confusion. All other systems reviewed and are negative. Vitals:    08/12/21 0008 08/12/21 0011   BP: 115/70    Pulse: (!) 104    Resp: 24 21   Temp: (!) 103.1 °F (39.5 °C)    SpO2: (!) 88% 94%   Weight: 141.1 kg (311 lb)    Height: 5' 5\" (1.651 m)             Physical Exam  Vitals and nursing note reviewed. Constitutional:       Appearance: She is well-developed. She is obese. HENT:      Head: Normocephalic and atraumatic. Nose: Congestion present. Mouth/Throat:      Mouth: Mucous membranes are dry. Eyes:      Conjunctiva/sclera: Conjunctivae normal.   Cardiovascular:      Rate and Rhythm: Normal rate and regular rhythm. Pulmonary:      Effort: Pulmonary effort is normal. Tachypnea present. No respiratory distress. Breath sounds: Wheezing present. Comments: Speaking in short sentences. Mildly increased work of breathing. Abdominal:      Palpations: Abdomen is soft. Tenderness: There is no abdominal tenderness. Musculoskeletal:         General: No deformity. Normal range of motion. Cervical back: Neck supple. Lymphadenopathy:      Cervical: Cervical adenopathy present. Skin:     General: Skin is warm and dry. Neurological:      Mental Status: She is alert and oriented to person, place, and time. Psychiatric:         Behavior: Behavior normal.         Thought Content: Thought content normal.          MDM  Number of Diagnoses or Management Options  COVID-19  Fever, unspecified fever cause  Hyponatremia  Hypoxia  Pneumonia of both lower lobes due to infectious organism  Diagnosis management comments: ddx - Covid, covid pneumonia, status asthmaticus, sepsis, pericarditis, myocarditis    ED Course as of Aug 12 0617   Thu Aug 12, 2021   0148 Chest x-ray shows bilateral patchy infiltrates. Will admit patient for Covid pneumonia and hypoxia. [SS]   0157 Patient accepted by Dr. Rafaela Garner as admission.    [SS]   5140 Discussed admission with patient. She understands and is amenable. Asking for ginger ale.    [SS]   0308 TOLERATING  p.o.    [SS]      ED Course User Index  [SS] Martín Boyer MD       Procedures  EKG done at 147: Normal sinus rhythm, rate 96, normal axis, normal intervals, no ectopy, normal ST segments. Nonischemic.   Interpreted by a Eva Long MD      CRITICAL CARE NOTE :    1:58 AM      IMPENDING DETERIORATION -Respiratory, Cardiovascular and Metabolic    ASSOCIATED RISK FACTORS - Hypoxia, Metabolic changes and Dehydration    MANAGEMENT- Bedside Assessment and Supervision of Care    INTERPRETATION -  Xrays and Cardiac Output Measures     INTERVENTIONS - hemodynamic mngmt, vascular control, Neurologic interventions  and Metobolic interventions    CASE REVIEW - Hospitalist/Intensivist and Nursing    TREATMENT RESPONSE -Improved    PERFORMED BY - Self        NOTES :      I have spent 45 minutes of critical care time involved in lab review, consultations with specialist, family decision- making, bedside attention and documentation. During this entire length of time I was immediately available to the patient . Etelvina Riddle MD    LABORATORY TESTS:  Recent Results (from the past 12 hour(s))   METABOLIC PANEL, COMPREHENSIVE    Collection Time: 08/12/21 12:48 AM   Result Value Ref Range    Sodium 130 (L) 136 - 145 mmol/L    Potassium 4.3 3.5 - 5.1 mmol/L    Chloride 94 (L) 97 - 108 mmol/L    CO2 26 21 - 32 mmol/L    Anion gap 10 5 - 15 mmol/L    Glucose 141 (H) 65 - 100 mg/dL    BUN 11 6 - 20 MG/DL    Creatinine 1.26 (H) 0.55 - 1.02 MG/DL    BUN/Creatinine ratio 9 (L) 12 - 20      GFR est AA 59 (L) >60 ml/min/1.73m2    GFR est non-AA 48 (L) >60 ml/min/1.73m2    Calcium 8.4 (L) 8.5 - 10.1 MG/DL    Bilirubin, total 0.8 0.2 - 1.0 MG/DL    ALT (SGPT) 36 12 - 78 U/L    AST (SGOT) 82 (H) 15 - 37 U/L    Alk. phosphatase 81 45 - 117 U/L    Protein, total 8.9 (H) 6.4 - 8.2 g/dL    Albumin 3.1 (L) 3.5 - 5.0 g/dL    Globulin 5.8 (H) 2.0 - 4.0 g/dL    A-G Ratio 0.5 (L) 1.1 - 2.2     CBC WITH AUTOMATED DIFF    Collection Time: 08/12/21 12:48 AM   Result Value Ref Range    WBC 3.8 3.6 - 11.0 K/uL    RBC 4.65 3.80 - 5.20 M/uL    HGB 12.9 11.5 - 16.0 g/dL    HCT 39.3 35.0 - 47.0 %    MCV 84.5 80.0 - 99.0 FL    MCH 27.7 26.0 - 34.0 PG    MCHC 32.8 30.0 - 36.5 g/dL    RDW 14.4 11.5 - 14.5 %    PLATELET 053 291 - 132 K/uL    MPV 10.4 8.9 - 12.9 FL    NRBC 0.0 0  WBC    ABSOLUTE NRBC 0.00 0.00 - 0.01 K/uL    NEUTROPHILS 60 32 - 75 %    BAND NEUTROPHILS 18 (H) 0 - 6 %    LYMPHOCYTES 9 (L) 12 - 49 %    MONOCYTES 10 5 - 13 %    EOSINOPHILS 3 0 - 7 %    BASOPHILS 0 0 - 1 %    IMMATURE GRANULOCYTES 0 %    ABS. NEUTROPHILS 3.0 1.8 - 8.0 K/UL    ABS. LYMPHOCYTES 0.3 (L) 0.8 - 3.5 K/UL    ABS. MONOCYTES 0.4 0.0 - 1.0 K/UL    ABS. EOSINOPHILS 0.1 0.0 - 0.4 K/UL    ABS.  BASOPHILS 0.0 0.0 - 0.1 K/UL ABS. IMM. GRANS. 0.0 K/UL    DF MANUAL      RBC COMMENTS NORMOCYTIC, NORMOCHROMIC     TROPONIN I    Collection Time: 08/12/21 12:48 AM   Result Value Ref Range    Troponin-I, Qt. 0.06 (H) <0.05 ng/mL   LACTIC ACID    Collection Time: 08/12/21 12:48 AM   Result Value Ref Range    Lactic acid 0.8 0.4 - 2.0 MMOL/L   EKG, 12 LEAD, INITIAL    Collection Time: 08/12/21  1:47 AM   Result Value Ref Range    Ventricular Rate 96 BPM    Atrial Rate 96 BPM    P-R Interval 142 ms    QRS Duration 80 ms    Q-T Interval 320 ms    QTC Calculation (Bezet) 404 ms    Calculated P Axis 35 degrees    Calculated R Axis 33 degrees    Calculated T Axis 5 degrees    Diagnosis       Normal sinus rhythm  Normal ECG  No previous ECGs available     URINALYSIS W/ REFLEX CULTURE    Collection Time: 08/12/21  3:56 AM    Specimen: Urine   Result Value Ref Range    Color YELLOW/STRAW      Appearance CLEAR CLEAR      Specific gravity <1.005 1.003 - 1.030    pH (UA) 6.0 5.0 - 8.0      Protein Negative NEG mg/dL    Glucose Negative NEG mg/dL    Ketone Negative NEG mg/dL    Bilirubin Negative NEG      Blood TRACE (A) NEG      Urobilinogen 1.0 0.2 - 1.0 EU/dL    Nitrites Negative NEG      Leukocyte Esterase Negative NEG      WBC 0-4 0 - 4 /hpf    RBC 0-5 0 - 5 /hpf    Epithelial cells FEW FEW /lpf    Bacteria Negative NEG /hpf    UA:UC IF INDICATED CULTURE NOT INDICATED BY UA RESULT CNI         IMAGING RESULTS:  XR CHEST PORT   Final Result    impression: Bilateral patchy infiltrates.           MEDICATIONS GIVEN:  Medications   sodium chloride (NS) flush 5-10 mL (has no administration in time range)   sodium chloride 0.9 % bolus infusion 1,000 mL (0 mL IntraVENous IV Completed 8/12/21 0254)     Followed by   sodium chloride 0.9 % bolus infusion 1,000 mL (0 mL IntraVENous IV Completed 8/12/21 0254)     Followed by   sodium chloride 0.9 % bolus infusion 1,000 mL (0 mL IntraVENous IV Completed 8/12/21 0316)     Followed by   sodium chloride 0.9 % bolus infusion 1,000 mL (0 mL IntraVENous IV Completed 8/12/21 0317)     Followed by   sodium chloride 0.9 % bolus infusion 233 mL (0 mL IntraVENous Held 8/12/21 0227)   dexAMETHasone (DECADRON) tablet 6 mg (6 mg Oral Given 8/12/21 0306)   azithromycin (ZITHROMAX) 500 mg in 0.9% sodium chloride 250 mL (VIAL-MATE) (0 mg IntraVENous IV Completed 8/12/21 0446)   dexamethasone (DECADRON) 10 mg/mL injection 10 mg (10 mg IntraVENous Given 8/12/21 0033)   ibuprofen (MOTRIN) tablet 800 mg (800 mg Oral Given 8/12/21 0035)   albuterol (PROVENTIL HFA, VENTOLIN HFA, PROAIR HFA) inhaler 8 Puff (8 Puffs Inhalation Given 8/12/21 0036)   cefTRIAXone (ROCEPHIN) 2 g in sterile water (preservative free) 20 mL IV syringe (2 g IntraVENous Given 8/12/21 0155)       IMPRESSION:  1. COVID-19    2. Pneumonia of both lower lobes due to infectious organism    3. Hypoxia    4. Hyponatremia    5. Fever, unspecified fever cause        PLAN:  1. Current Discharge Medication List        2.    Follow-up Information    None       Return to ED if worse

## 2021-08-12 NOTE — PROGRESS NOTES
TRANSFER - IN REPORT:    Verbal report received from Darcy Rn(name) on [place]  being received from ED(unit) for routine progression of care      Report consisted of patients Situation, Background, Assessment and   Recommendations(SBAR). Information from the following report(s) SBAR, Kardex, Intake/Output, MAR, Accordion, Recent Results, Med Rec Status and Cardiac Rhythm NSR was reviewed with the receiving nurse. Opportunity for questions and clarification was provided. Assessment completed upon patients arrival to unit and care assumed. 0845 pt came to floor by wheel chair. pt awake and alert,ambulate to her bed. pt on O2  n/c 4 l/m, sat 97%. call bell in reach. pt assessed and continue care assumed. 5843 Sat. 98% on O2 4 litre on N/C, O2 decreased to 2 litre,sat 93%. no acute distress noticed. 1015 Scheduled medicine given,covid swab collected and sent to lab. 805 Shayne Altman Interdisciplinary team rounds were held 8/12/2021 with the following team members:Care Management, Nursing, Pharmacy and Physician. Plan of care discussed. See clinical pathway and/or care plan for interventions and desired outcomes. 1316 Pt medicated with cough medicine for constant dry cough. pt refused to eat lunch. 1337 pt has strong constant dry cough,pt has hard time catching her breath. Dr Elkin Brooks notified about pt condition. pt medicated with Robitussin AC.    1500 pt feeling better. no acute distress noticed. [phone] pt received her scheduled medicine. pt medicated with cough medicine for constant dry cough. 1900 pt still c/o constant dry cough. Dr Elkin Brooks consulted. Pt medicated with Tessalon capsule. 1915 . Bedside and Verbal shift change report given to Leonid Abdul (oncoming nurse) by Negro Lorenzo (offgoing nurse). Report included the following information SBAR, Kardex, Intake/Output, MAR, Accordion, Recent Results, Med Rec Status and Cardiac Rhythm SR/ST.

## 2021-08-12 NOTE — PROGRESS NOTES
Case opened for assessment and d/c planning. History is illness  52-year-old female who was diagnosed with COVID-19 yesterday after presented to the ER. She was treated and discharged with albuterol and supplements.   She returned with increasing shortness of breath, cough, nausea, fever and generalized weakness    JOB Ward/ALBAN

## 2021-08-12 NOTE — PROGRESS NOTES
Pharmacy Consult Note  Reason for Consult: remdesivir  Physician: Damion Diaz    This patient meets criteria for initiation of remdesivir based on the following:  · Confirmed positive COVID 19 PCR or Antigen test within 7 days of symptom onset (< 7 days)   · Severe disease (Sp02<=94% on RA, requiring supplemental O2, or requiring invasive mechanical ventilation)  · Acceptable renal function:  CrCl>30 ml/min based on SCr obtained prior to initiation OR CrCl<30 ml/min but the benefit outweighs the risk  -CrCl<30 ml/min: use with caution due to risk of cyclodextrin accumulation and toxicity risk   · Acceptable hepatic function (ALT less than 390 U/L  - 5 X ULN, AST<185 U/L)    Dosing:    · Hospitalized adults not requiring invasive mechanical ventilation: 200 mg by IV infusion on day 1, followed by 100 mg by IV infusion once daily on days 2-5. Therapy may be extended based on clinical scenario and clinician judgement. - If a patient has clinically improved and is ready for discharge, completion of remdesivir course should not be the sole reason for continued admission     Monitoring parameters:  Hepatic laboratory testing and CBC should be performed in all patients prior to starting remdesivir and daily while receiving remdesivir (X 5 days). Remdesivir should not be initiated in patients with ALT = 390 U/L at baseline (5X ULN) or AST>185 U/L (5X ULN)  Remdesivir should be discontinued in patients who develop:  ALT = 390 U/L during treatment with remdesivir. Remdesivir may be restarted when ALT is < 5 times ULN, or   ALT elevation accompanied by signs or symptoms of liver inflammation or increasing conjugated bilirubin, alkaline phosphatase, or INR. Drug-drug interaction trials of remdesivir and other concomitant medications have not been conducted in humans.    Reporting Submit adverse event reports to FDA MedWatch:  Complete and submit the report online: www.fda.gov/medwatch/report.htm    Orders have been entered.     Thank you,  Abbi MerrittD, BCPS  [phone]

## 2021-08-12 NOTE — ED NOTES
Pt noted to have SOB w/ exertion when ambulating to restroom. Pt 88% on RA after ambulation. Pt noted to have large episode of stress incontinent urine. Bed cleaned and linens changed. Pt provided w/ underwear and paper scrubs.

## 2021-08-12 NOTE — H&P
History and Physical    Patient: Rosalba Velez MRN: [id number]  SSN: [id number]    YOB: 1985  Age: 29 y.o. Sex: female      Subjective:     Telemedicine history and physical  Chief complaint shortness of breath    History is illness  81-year-old female who was diagnosed with COVID-19 yesterday after presented to the ER. She was treated and discharged with albuterol and supplements. She returned with increasing shortness of breath, cough, nausea, fever and generalized weakness. She has been feeling ill generally for the past 5 days or so. She says her sister is also positive with Covid. She has not received her vaccine yet. On presentation she was 88% room air placed on 2 L nasal cannula. Lab work-up CBC was benign, chemistry showed potassium 4.3, bilirubin normal, AST 82, troponin 0.06. Chest x-ray reviewed. There is significant bilateral infiltrates. Patient seen and examined in telemedicine video evaluation she does not appear to be in any visual distress sitting up at bedside. Past medical history  Asthma    Past surgical history  None    Family history no family history of colon cancer    Social History     Tobacco Use    Smoking status: Never Smoker    Smokeless tobacco: Never Used   Substance Use Topics    Alcohol use: Yes     Comment: occasionally      Prior to Admission medications    Medication Sig Start Date End Date Taking? Authorizing Provider   Nebulizer & Compressor machine Use machine to administer nebulizer solution for shortness of breathing, coughing, or wheezing. 8/4/21   Giselle Hart NP   ProAir HFA 90 mcg/actuation inhaler Take 2 Puffs by inhalation every four (4) hours as needed for Wheezing, Shortness of Breath or Cough. 7/30/21   Giselle Hart NP   fluticasone propionate (FLOVENT HFA) 110 mcg/actuation inhaler Take 1 Puff by inhalation every twelve (12) hours. 7/30/21   Giselle Hart NP   montelukast (SINGULAIR) 10 mg tablet Take 1 Tablet by mouth daily. 7/30/21   Odruben Castaneda NP   famotidine (PEPCID) 20 mg tablet Take 1 Tablet by mouth two (2) times a day. 7/30/21   Yolande Castaneda NP   albuterol (PROVENTIL VENTOLIN) 2.5 mg /3 mL (0.083 %) nebu Take 3 mL by inhalation every four (4) hours as needed for Wheezing, Shortness of Breath or Cough. 6/7/21   Yolande Castaneda NP   lidocaine (Lidoderm) 5 % Apply patch to the affected area for 12 hours a day and remove for 12 hours a day. 1/29/21   Yolande Castaneda NP   ibuprofen (MOTRIN) 800 mg tablet  8/17/20   Provider, Historical        Allergies   Allergen Reactions    Lactose Unknown (comments)    Pcn [Penicillins] Itching       Review of Systems:  A comprehensive review of systems was negative except for that written in the History of Present Illness. Objective:     Vitals:    08/12/21 0011 08/12/21 0020 08/12/21 0036 08/12/21 0154   BP:   115/70 118/61   Pulse:   (!) 101 98   Resp: 21   27   Temp:    (!) 101.2 °F (38.4 °C)   SpO2: 94% 94%  95%   Weight:       Height:            Physical Exam:  Telemedicine history and physical  General patient awake alert no visible distress  HEENT sclerae nonicteric, extraoculars are intact  Neck supple  Abdomen soft nontender  Extremities moving all extremities equally no edema  Musculoskeletal moving all extremities normal strength 5 out of 5  Neuro she is awake alert sensations intact nonfocal  Psychiatry normal mood and affect    Labs reviewed discussed as above    Assessment  Acute hypoxic respiratory failure  COVID-19 positive  Bilateral pneumonia  Morbid obesity BMI 51  History of asthma    Plan  Admit to hospital for COVID-19 treatment and hypoxemia. Contact and airborne isolation  Rocephin, azithromycin, dexamethasone, albuterol 4 times daily  Vitamin C, vitamin D, zinc, melatonin, Pepcid  Start remdesivir. Discussed this is emergency use authorized medication that is shown clinical benefit in hospitalized patients. Patient agreeable to receive.   Check CRP  Patient currently on 2 L I reviewed her chest x-ray that she has significant infiltrates and is at risk of worsening respiratory status. Monitor closely. Lovenox for DVT prophylaxis    Patient seen and telemedicine video evaluation.   Patient be followed by day hospitalist team in the morning        Signed By: Petra Kelly MD     August 12, 2021

## 2021-08-12 NOTE — ED NOTES
Pt requesting to use restroom. Pt able to ambulate w/ steady coordinated gait to provide urine sample.

## 2021-08-12 NOTE — PROGRESS NOTES
[facility]: MED RECONCILIATION    Comments:  Patient reports she has albuterol nebulizer vials, but does not have a nebulizer machine    Information obtained from: Patient, via telephone    Allergies: Lactose and Pcn [penicillins] -tolerating ceftriaxone    Prior to Admission Medications:   Prior to Admission Medications   Prescriptions Last Dose Informant Patient Reported? Taking? ProAir HFA 90 mcg/actuation inhaler  Self No Yes   Sig: Take 2 Puffs by inhalation every four (4) hours as needed for Wheezing, Shortness of Breath or Cough. albuterol (PROVENTIL VENTOLIN) 2.5 mg /3 mL (0.083 %) nebu  Self No Yes   Sig: Take 3 mL by inhalation every four (4) hours as needed for Wheezing, Shortness of Breath or Cough. famotidine (PEPCID) 20 mg tablet  Self No Yes   Sig: Take 1 Tablet by mouth two (2) times a day. fluticasone propionate (FLOVENT HFA) 110 mcg/actuation inhaler  Self No Yes   Sig: Take 1 Puff by inhalation every twelve (12) hours. lidocaine (LIDODERM) 5 %  Self Yes Yes   Si Patch by TransDERmal route daily as needed (back pain). Apply patch to the affected area for 12 hours a day and remove for 12 hours a day. montelukast (SINGULAIR) 10 mg tablet  Self No Yes   Sig: Take 1 Tablet by mouth daily.         Thank you,  [place] Kay, PharmD, BCPS  [phone]

## 2021-08-12 NOTE — ED NOTES
Pt arrives via EMS c/o increasingly SOB x 1 hour PTA. Pt reports being diagnosed w/ COVID-19 yesterday at [facility], d/c w/ ibuprofen and tylenol. Pt reports initial symptoms began this past Saturday w/ a non productive cough and sneezing. Since then, pt has developed diarrhea, nausea, vomiting and fevers at home managed by Tylenol. Pt reports medicating w/ Tylenol PTA. Pt reports recent sick contacts w/ family members in same household that are COVID-19 positive. Hx of Asthma. Pt reports using inhalers at home w/o any relief. Pt arrives hypoxic at 88% on RA. Pt speaking in intermittent sentences, tachypnic and labored breathing. Pt immediately placed on 2 L/min via NC. O2 Sat 94%. Pt resting upright on the stretcher in a position of comfort and in no acute distress. Pt reports not being vaccinated for COVID-19. Emergency Department Nursing Plan of Care       The Nursing Plan of Care is developed from the Nursing assessment and Emergency Department Attending provider initial evaluation. The plan of care may be reviewed in the ED Provider note.     The Plan of Care was developed with the following considerations:   Patient / Family readiness to learn indicated by:verbalized understanding  Persons(s) to be included in education: patient  Barriers to Learning/Limitations:No    Signed     Dante Tyler    8/12/2021   3:47 AM

## 2021-08-12 NOTE — PROGRESS NOTES
Physician Progress Note      Андрей Joseph  CSN #:                  [id number]  :                       1985  ADMIT DATE:       2021 12:01 AM  DISCH DATE:  RESPONDING  PROVIDER #:        Michele Vick MD          QUERY TEXT:    Pt admitted with COVID-19 and noted to have  tmax: 103.1; hr: 90; and  rr:20. If possible, please document in progress notes and discharge summary if you are evaluating and/or treating: The medical record reflects the following:  Risk Factors: Hx: Asthma; Recently diagnosed w/ COVID-19 at [facility]  Clinical Indicators: tmax: 103.1; hr: ; rr: 20-31; 88% on RA. ... 94% on2L NC. ... Leydi Yin Na +: 130; Bun/Cr: 11/1.26; Lac acid: 0.8; Tnl: 0.06. ...... CXR: B/L patchy infiltrates. .. Leydi Yin SARS-CoV-2 NOTD   Detected  Treatment: IVF NS Bolus 1,000ml x 4; IV Decadron; PO Motrin; IV Rocephin; IV Zithromax; Albuterol Neb; CXR; BCx; COVID R/o; IVF    Thank you,    Zully Porter  [facility]  [phone]  Options provided:  -- Sepsis present on admission due to COVID-19 infection  -- Sepsis present on admission due to COVID-19 pneumonia  -- Covid-19 infection without sepsis  -- Covid-19 pneumonia without sepsis  -- Other - I will add my own diagnosis  -- Disagree - Not applicable / Not valid  -- Disagree - Clinically unable to determine / Unknown  -- Refer to Clinical Documentation Reviewer    PROVIDER RESPONSE TEXT:    This patient has sepsis which was present on admission due to COVID-19 infection.     Query created by: Britt Dotson on 2021 12:15 PM      Electronically signed by:  Michele Vick MD 2021 12:21 PM

## 2021-08-12 NOTE — ED NOTES
TRANSFER - OUT REPORT:    Verbal report given to Prabha Goodman RN (name) on Елена Mask  being transferred to PCU 1 (unit) for routine progression of care       Report consisted of patients Situation, Background, Assessment and   Recommendations(SBAR). Information from the following report(s) SBAR and ED Summary was reviewed with the receiving nurse. Lines:   Peripheral IV 08/12/21 Right Antecubital (Active)   Site Assessment Clean, dry, & intact 08/12/21 0023   Phlebitis Assessment 0 08/12/21 0023   Infiltration Assessment 0 08/12/21 0023   Dressing Status Clean, dry, & intact 08/12/21 0023   Dressing Type Tape;Transparent 08/12/21 0023   Hub Color/Line Status Pink;Flushed;Patent 08/12/21 0023   Alcohol Cap Used Yes 08/12/21 0023       Peripheral IV 08/12/21 Left Antecubital (Active)   Site Assessment Clean, dry, & intact 08/12/21 0046   Phlebitis Assessment 0 08/12/21 0046   Infiltration Assessment 0 08/12/21 0046   Dressing Status Clean, dry, & intact 08/12/21 0046   Dressing Type Tape;Transparent 08/12/21 0046   Hub Color/Line Status Pink;Flushed;Patent 08/12/21 0046   Alcohol Cap Used Yes 08/12/21 0046        Opportunity for questions and clarification was provided.       Patient transported with:   Registered Nurse

## 2021-08-12 NOTE — PROGRESS NOTES
IDR's held today to discuss patient's care  RUR: 13  GLOS: 3d  LOS:    ELOS: 3d     Extended care needs are: PCP  Barrier to discharge: none     Planning-  On oxygen  Inhaler  No pain noted  Temp 101  Cough  Anticipate d/c on Sunday. Will need PCP follow, quarantine.     JOB Ward/CM Last Visit:   Next Appt:   Previous Refill Encounter: +5    Requested Prescriptions     Pending Prescriptions Disp Refills    ipratropium (ATROVENT) 0.02 % soln 180 mL 5     Si.5 mL by Nebulization route four (4) times daily as needed.

## 2021-08-12 NOTE — ED NOTES
Emergency Department Nursing Plan of Care       The Nursing Plan of Care is developed from the Nursing assessment and Emergency Department Attending provider initial evaluation. The plan of care may be reviewed in the ED Provider note.     The Plan of Care was developed with the following considerations:   Patient / Family readiness to learn indicated by:verbalized understanding  Persons(s) to be included in education: patient  Barriers to Learning/Limitations:No    Signed     Raymon Tyler    8/12/2021   1:05 AM

## 2021-08-13 PROBLEM — J96.01 ACUTE HYPOXEMIC RESPIRATORY FAILURE DUE TO COVID-19 (HCC): Status: ACTIVE | Noted: 2021-01-01

## 2021-08-13 PROBLEM — U07.1 ACUTE HYPOXEMIC RESPIRATORY FAILURE DUE TO COVID-19 (HCC): Status: ACTIVE | Noted: 2021-01-01

## 2021-08-13 NOTE — PROGRESS NOTES
Mehnaz.Petit- EMT/EMS transport at bedside. ER MD at bedside. Patient currently on BIPAP. ER MD verbalized hospitalist asked for intubation. ER MD discussed intubation with patient and proceeded. EMT/EMS remained. ER RNs asked to come upstairs after intubation to scan medications and assist MD.  Hospitalist on tele monitor sporadically to receive update and give orders  ER MD at bedside, leaving at times and returning when requested. ER MD at bedside when patient getting transferred  RT present at bedside throughout   EMT Lieutenant outside room-monitoring and asking for pieces of information throughout and communicating with EMT Captain. 0330- patient left hospital intubated but being bagged to maintain O2 levels.

## 2021-08-13 NOTE — CONSULTS
[facility]  Consultation    Subjective/Clinical Summary:      Елена Martinez is a 29 y.o. female who was referred for cardiac surgery evaluation of Covid+ ARDS, Hypoxemic respiratory failure by Dr. Erwin Vega. PMHx is significant for Asthma. Patient was admitted with covid pneumonia and rapidly deteriorated into ARDS requiring intubation. Settings quickly escalated to 100% FiO2 & PEEP of 20. Last ABG shows a PO2 of 60. We are asked to consider VV ECMO. Patient is intubated, unable to provide further subjective information. Information obtained from chart review and discussions with the patient's care team. She has not been proned. History reviewed. No pertinent past medical history. History reviewed. No pertinent surgical history. Social History     Tobacco Use    Smoking status: Never Smoker    Smokeless tobacco: Never Used   Substance Use Topics    Alcohol use: Yes     Comment: occasionally      History reviewed. No pertinent family history. Prior to Admission medications    Medication Sig Start Date End Date Taking? Authorizing Provider   lidocaine (LIDODERM) 5 % 1 Patch by TransDERmal route daily as needed (back pain). Apply patch to the affected area for 12 hours a day and remove for 12 hours a day. Provider, Sasha   ProAir HFA 90 mcg/actuation inhaler Take 2 Puffs by inhalation every four (4) hours as needed for Wheezing, Shortness of Breath or Cough. 7/30/21   Clarisse Rojas NP   fluticasone propionate (FLOVENT HFA) 110 mcg/actuation inhaler Take 1 Puff by inhalation every twelve (12) hours. 7/30/21   Clarisse Rojas NP   montelukast (SINGULAIR) 10 mg tablet Take 1 Tablet by mouth daily. 7/30/21   Clarisse Rojas NP   famotidine (PEPCID) 20 mg tablet Take 1 Tablet by mouth two (2) times a day. 7/30/21   Clarisse Rojas NP   albuterol (PROVENTIL VENTOLIN) 2.5 mg /3 mL (0.083 %) nebu Take 3 mL by inhalation every four (4) hours as needed for Wheezing, Shortness of Breath or Cough. 6/7/21   Leydi Goodrich NP       Allergies   Allergen Reactions    Lactose Nausea Only    Pcn [Penicillins] Itching     Patient unsure of penicillin allergy. Tolerates ceftriaxone       Review of Systems:    [x] Unable to obtain  ROS due to  []mental status change  [x]sedated   [x]intubated   []Total of 13 systems reviewed as follows:  Constitutional: negative fever, negative chills  Eyes:   negative for amauroses fugax  ENT:   negative sore throat,oral absecess  Endocrine Negative for thyroid replacement Rx; goiter; DM  Respiratory:  negative chronic cough,sputum production  Cards:  negative for  palpitations, lower extremity edema, varicosities, Claudication  GI:   negative for dysphagia, bleeding, nausea, vomiting, diarrhea, and     abdominal pain  Genitourinary: negative for frequency, dysuria, BPH in men. Integument:  negative for rash and pruritus  Hematologic:  negative for easy bruising; bleeding dyscarsia  Musculoskel: negative for muscle weakness inhibiting ambulation  Neurological:  negative for stroke, TIA, syncope, dizziness  Behavl/Psych: negative for feelings of anxiety, depression     Objective:     Telemetry: [x]Sinus []A-flutter []Paced    []A-fib []Multiple PVCs     VS:   Visit Vitals  /79   Pulse 63   Temp (!) 100.8 °F (38.2 °C)   Resp 28   Ht 5' 5\" (1.651 m)   Wt 334 lb 14.1 oz (151.9 kg)   SpO2 100%   BMI 55.73 kg/m²     Physical Exam:    General appearance: intubated, sedated  Head: normocephalic, without obvious abnormality; atraumatic  Eyes: conjunctivae/corneas clear  Nose: nares normal; no drainage. Neck: no carotid bruit and no JVD  Lungs: clear to auscultation bilaterally  Heart: regular rate and rhythm; no murmur  Abdomen: soft, non-tender; bowel sounds normal  Extremities: moves all extremities; no weakness. Skin: Skin color normal; No varicose veins or edema.   Neurologic: intubated, sedated     Labs:   Personally reviewed  Recent Labs     08/13/21  0510 08/12/21  0048 WBC 4.4 3.8   HGB 12.2 12.9   HCT 37.9 39.3   * 182     Recent Labs     08/13/21  0510 08/12/21  0048    130*   K 4.4 4.3    94*   CO2 25 26   BUN 12 11   CREA 1.33* 1.26*   * 141*   CA 8.3* 8.4*   MG 2.0  --    PHOS 3.4  --      Recent Labs     08/13/21  0510 08/12/21  0048   AP 72  68 81   TP 8.0  7.9 8.9*   ALB 2.7*  2.7* 3.1*   GLOB 5.3*  5.2* 5.8*     No results for input(s): INR, PTP, APTT, INREXT in the last 72 hours. Recent Labs     08/13/21  0554 08/13/21  0427   PHI 7.37 7.35   PCO2I 40.9 44.3   PO2I 60* 58*   FIO2I 100 100     Recent Labs     08/13/21  0510 08/12/21  0048   CPK 10,515*  --    TROIQ  --  0.06*       Diagnostics:   CXR Results  (Last 48 hours)               08/13/21 0915  XR CHEST PORT Final result    Impression:  Diffuse bilateral airspace disease is unchanged to slightly   progressed in the left upper lobe       Narrative:  EXAM:  XR CHEST PORT       INDICATION:  Low Oxygen Saturation       COMPARISON:  8/13/2021       FINDINGS: A portable AP radiograph of the chest was obtained at 847 hours. ET   tube is in satisfactory position. NG tube overlies the stomach. .  Diffuse   bilateral airspace disease is unchanged to slightly progressed in the left upper   lobe. .  Mild cardiomegaly is stable. . .            08/13/21 0451  XR CHEST PORT Final result    Impression:  Diffuse bilateral infiltrates, support devices as above. Narrative:  Clinical indication: Shortness of breath. Portable AP semiupright view of the chest is obtained, ET tube in place. NG tube   in the a proximal stomach. Diffuse bilateral infiltrates again demonstrated. Leann Remedies 08/13/21 0316  XR CHEST PORT Final result    Impression:   impression: ET tube in place. Narrative:  Clinical indication: Tube placement. Portable AP supine view of the upper chest show ET tube in place well the   tyler. Diffuse bilateral infiltrates.            08/13/21 0100  XR CHEST PORT Final result    Impression:  Significant worsening of bilateral infiltrates       Narrative:  Clinical indication: Respiratory failure. Portable upright view of the chest obtained and compared to August 12. significant worsening of bilateral infiltrates with diffuse opacification of   both lung fields at this time. 08/12/21 0114  XR CHEST PORT Final result    Impression:   impression: Bilateral patchy infiltrates. Narrative:  Clinical indication: Infiltrate. Portable AP upright view of the chest obtained, comparison November 2016. The   inspiration is shallow, the heart is enlarged. Bilateral patchy infiltrates. EKG: normal EKG, normal sinus rhythm, unchanged from previous tracings. Assessment:     Active Problems:    Acute hypoxemic respiratory failure due to COVID-19 [facility]) (8/13/2021)        Plan:     1. Septic Shock, VDRF, Hypoxemic Respiratory Failure, COVID+: patient has not yet been proned. O2 sats currently 100% on current vent settings. Will hope to avoid ECMO. Cont optimizing vent settings. Discussed with intensivist. Patient needs to be proned. If she continues to deteriorate despite these interventions, can reconsider ECMO. Although she is a poor candidate given her BMI. Discussed with Dr. Kailee Butler. 2. Asthma  3. Morbid Obesity (BMI 55.73): nutrition counseling when appropriate. 4. Thrombocytopenia: possibly related to sepsis. Monitor. 5. MADAI: cr. 1.3. Monitor. Dispo: hold off on VV ECMO for now. Start proning per intensivist. Will follow and reasess need for VV ECMO on an ongoing basis.     Signed By: JERI Carcamo     August 13, 2021

## 2021-08-13 NOTE — PROGRESS NOTES
Nutrition Note    Chart reviewed and case discussed during CCU rounds. Pt is intubated, on pressor support and sedated at this time with propofol providing 1082 kcals/day (at 45mcg/kg/min). Per Dr Yonny Crawford, pt not yet stable enough to start feeding today. TF recommendations provided if needed over the weekend. RD also available by consult.      Recommend TwoCal HN goal at 15mL/hr + Prosource Q 4hr (6 packs per day) + 50mL Q 6hr free water flush   TF + prosource + propofol would provide 2162 kcals, 120g protein, 79g CHO, 452mL free water  Meets 100% protein needs    Estimated needs:  7482-5442 kcals (12-15 kcals/kg)  114-143g protein (2.0-2.5g/kg IBW)    Electronically signed by Flaquito Oropeza RD on 8/13/2021 at 4:18 PM    Contact: [phone]

## 2021-08-13 NOTE — PROGRESS NOTES
08/13/21 0058   Vital Signs   Temp 99 °F (37.2 °C)   Temp Source Oral   Pulse (Heart Rate) (!) 102   Heart Rate Source Monitor   Resp Rate (!) 58   O2 Sat (%) 90 %   Level of Consciousness Responds to Voice (1)   /70   MAP (Calculated) 90   BP 1 Method Automatic   MEWS Score 4   Pain 1   Pain Scale 1 Numeric (0 - 10)   Pain Intensity 1 0   Patient Stated Pain Goal 0   Pain Reassessment 1 Yes   Oxygen Therapy   Pulse via Oximetry 102 beats per minute     MD INFORMED

## 2021-08-13 NOTE — H&P
[facility]    ICU TEAM Progress Note    Name: Lauren Galdamez   : 1985   MRN: [id number]   Date: 2021      Subjective:   Progress Note: 2021      29 y.o. female, with significant pmhx of asthma, who presents via EMS  From [facility] inpatient to the ED with report of decompensation over the last 12 hours due to covid PNA. Over the course of the past 12 hours pt went from NC to Bipap to intubation for refractory hypoxemia. Upon arrival to ED [facility] ED pt SPO2 in the 70's; however, as per EMS during transport she had to be manually ventilated with ambu bag and they did not have a peep valve. Initial ABG 7.35/44/58/25. Placed on peep of 15 with 100% FiO2. Will be admitted to the ICU. Active Problem List:     Problem List  Date Reviewed: 2021        Codes Class    Acute hypoxemic respiratory failure due to COVID-19 [facility]) ICD-10-CM: U07.1, J96.01  ICD-9-CM: 518.81, 079.89, 799.02         COVID-19 ICD-10-CM: U07.1  ICD-9-CM: 079.89         Moderate persistent asthma without complication [facility]-08-ER: D00.90  ICD-9-CM: 493.90         Obesity, morbid ([facility] [address] ICD-10-CM: E66.01  ICD-9-CM: 278.01               Past Medical History:      has no past medical history on file. Past Surgical History:      has no past surgical history on file. Home Medications:     Prior to Admission medications    Medication Sig Start Date End Date Taking? Authorizing Provider   lidocaine (LIDODERM) 5 % 1 Patch by TransDERmal route daily as needed (back pain). Apply patch to the affected area for 12 hours a day and remove for 12 hours a day. Provider, Historical   ProAir HFA 90 mcg/actuation inhaler Take 2 Puffs by inhalation every four (4) hours as needed for Wheezing, Shortness of Breath or Cough. 21   Belita Ovens, NP   fluticasone propionate (FLOVENT HFA) 110 mcg/actuation inhaler Take 1 Puff by inhalation every twelve (12) hours.  21   Belita Ovens, TASIA PARRISH) 10 mg tablet Take 1 Tablet by mouth daily. 21   Lucero Polk NP   famotidine (PEPCID) 20 mg tablet Take 1 Tablet by mouth two (2) times a day. 21   Lucero Polk NP   albuterol (PROVENTIL VENTOLIN) 2.5 mg /3 mL (0.083 %) nebu Take 3 mL by inhalation every four (4) hours as needed for Wheezing, Shortness of Breath or Cough. 21   Lucero Polk NP       Allergies/Social/Family History: Allergies   Allergen Reactions    Lactose Nausea Only    Pcn [Penicillins] Itching     Patient unsure of penicillin allergy. Tolerates ceftriaxone      Social History     Tobacco Use    Smoking status: Never Smoker    Smokeless tobacco: Never Used   Substance Use Topics    Alcohol use: Yes     Comment: occasionally      History reviewed. No pertinent family history. Review of Systems:     Review of systems not obtained due to patient factors. Objective:   Vital Signs:  Visit Vitals  BP (!) 84/52   Pulse 82   Temp (!) 101.1 °F (38.4 °C)   Resp 21   Wt 151.9 kg (334 lb 14.1 oz)   SpO2 92%   BMI 55.73 kg/m²      O2 Device: Endotracheal tube Temp (24hrs), Av.4 °F (37.4 °C), Min:97.6 °F (36.4 °C), Max:101.1 °F (38.4 °C)           Intake/Output:   No intake or output data in the 24 hours ending 21 0534    Physical Exam:    General:  sedated  Eye:  conjunctivae/corneas clear. PERRL,  Neurologic:  sedated  Lymphatic:  Cervical, supraclavicular, and axillary nodes normal.   Neck:  normal and no erythema or exudates noted. Lungs:  clear to auscultation bilaterally  Heart:  regular rate and rhythm, S1, S2 normal, no murmur, click, rub or gallop  Abdomen:  soft, non-tender.  Bowel sounds normal. No masses,  no organomegaly  Cardiovascular:  Regular rate and rhythm, S1S2 present, without murmur or extra heart sounds, pedal pulses normal and no edema  Skin:  Normal.    LABS AND  DATA: Personally reviewed  Recent Labs     21  0048   WBC 3.8   HGB 12.9   HCT 39.3        Recent Labs 08/12/21 0048   *   K 4.3   CL 94*   CO2 26   BUN 11   CREA 1.26*   *   CA 8.4*     Recent Labs     08/12/21 0048   AP 81   TP 8.9*   ALB 3.1*   GLOB 5.8*     No results for input(s): INR, PTP, APTT, INREXT in the last 72 hours.    Recent Labs     08/13/21 0427   PHI 7.35   PCO2I 44.3   PO2I 58*   FIO2I 100     Recent Labs     08/12/21 0048   TROIQ 0.06*       Hemodynamics:   PAP:   CO:     Wedge:   CI:     CVP:    SVR:       PVR:       Ventilator Settings:  Mode Rate Tidal Volume Pressure FiO2 PEEP   Assist control   450 ml    100 % 15 cm H20     Peak airway pressure: 44 cm H2O    Minute ventilation: 12.2 l/min        MEDS: Reviewed    Chest X-Ray: personally reviewed and report checked      Assessment and Plan:   Ventilator dependent acute hypoxic respiratory failure 2/2 COVID 19 PNA  -RASS -4 to keep synchronous with the vent  -repeat ABG  -decadron 6mg daily x10 day  -f/u CRP, if >7.5 give 1 dose toci 8mg/kg up to 800mg  -f/u on sputum culture, will hold off on antibiotics until s/o bacterial infection    Shock 2/2 to COVID 19  -hold off on IVF as pt appears volume overloaded  -strict I/Os  -titrate levo for goal map>65    Pain Medications: Fentanyl  Target RASS: -4 - Deep Sedation - No response to voice, but movement or eye opening  Sedation Medications: Propofol  CAM-ICU:  Positive  Mobility: Bedrest  Discussed Plan of Care (goals of care): No, unable to get in touch with NOK  Addressed Code Status: Full Code    CARDIOVASCULAR  Cardiac Gtts: Norepinephrine  SBP Goal of: > 90 mmHg  MAP Goal of: > 65 mmHg  Transfusion Trigger (Hgb): <7 g/dL    RESPIRATORY  Vent Goals:   Chlorhexidine   Optimize PEEP/Ventilation/Oxygenation  Goal Tidal Volume 6 cc/kg based on IBW  Aim for lung protective ventilation  Head of bed > 30 degrees  DVT Prophylaxis (if no, list reason): SCD's or Sequential Compression Device and Lovenox   SPO2 Goal: > 92%  Pulmonary toilet: Duo-Nebs     GI/  Durbin Catheter Present: Yes  GI Prophylaxis: Protonix (pantoprazole)   Nutrition: No     ANTIBIOTICS  Antibiotics:  none at this time, no signs of bacterial superimprosed infection    T/L/D  Tubes: ETT  Lines: Peripheral IV  Drains: Durbin Catheter    DISPOSITION  Stay in ICU    CRITICAL CARE CONSULTANT NOTE  I had a face to face encounter with the patient, reviewed and interpreted patient data including clinical events, labs, images, vital signs, I/O's, and examined patient. I have discussed the case and the plan and management of the patient's care with the consulting services, the bedside nurses and the respiratory therapist.      NOTE OF PERSONAL INVOLVEMENT IN CARE   This patient has a high probability of imminent, clinically significant deterioration, which requires the highest level of preparedness to intervene urgently. I participated in the decision-making and personally managed or directed the management of the following life and organ supporting interventions that required my frequent assessment to treat or prevent imminent deterioration. I personally spent 45 minutes of critical care time. This is time spent at this critically ill patient's bedside actively involved in patient care as well as the coordination of care and discussions with the patient's family. This does not include any procedural time which has been billed separately.     Elsie Hawthorne NP  [facility] Critical Care  8/13/2021

## 2021-08-13 NOTE — PROGRESS NOTES
08/12/21 2330   Vital Signs   Temp 99.9 °F (37.7 °C)   Temp Source Oral   Pulse (Heart Rate) (!) 102  (ON NONREBREATHER)   Heart Rate Source Monitor   Cardiac Rhythm Sinus Tach   Resp Rate (!) 41  (ON NONREBREATHER)   O2 Sat (%) (!) 87 %  (ON NONREBREATHER)   Level of Consciousness Alert (0)   /68  (ON NONREBREATHER)   MAP (Monitor) 82  (ON NONREBREATHER)   MAP (Calculated) 89   MEWS Score 4   Oxygen Therapy   Pulse via Oximetry 101 beats per minute  (ON NONREBREATHER)     MD CONTACTED.  PATIENT WILL BE PLACED ON BIPAP AND TRANSFER WILL BE STARTED TO HIGHER LEVEL OF CARE

## 2021-08-13 NOTE — Clinical Note
Status[de-identified] INPATIENT [101]   Type of Bed: Intensive Care [6]   Inpatient Hospitalization Certified Necessary for the Following Reasons: 4.  Patient requires ICU level of care interventions (further clarification in H&P documentation)   Admitting Diagnosis: Acute hypoxemic respiratory failure due to COVID-19 [facility]) [[id number]]   Admitting Physician: Margie Velázquez   Attending Physician: Priscilla Martinez [[id number]]   Estimated Length of Stay: 7+ Midnights   Discharge Plan[de-identified] Home with Office Follow-up

## 2021-08-13 NOTE — ROUTINE PROCESS
TRANSFER - OUT REPORT:    Verbal report given to BELEN HENRY(name) on Marybeth Hart  [facility] [id number] [facility] for urgent transfer       Report consisted of patients Situation, Background, Assessment and   Recommendations(SBAR). Information from the following report(s) SBAR, Kardex, ED Summary, Recent Results and Cardiac Rhythm SINUS TACHY was reviewed with the receiving nurse. Lines:   Peripheral IV 08/12/21 Right Antecubital (Active)   Site Assessment Clean, dry, & intact 08/12/21 0916   Phlebitis Assessment 0 08/12/21 0916   Infiltration Assessment 0 08/12/21 0916   Dressing Status Clean, dry, & intact 08/12/21 0916   Dressing Type Tape;Transparent 08/12/21 0916   Hub Color/Line Status Pink;Capped 08/12/21 0916   Alcohol Cap Used Yes 08/12/21 0916       Peripheral IV 08/12/21 Left Antecubital (Active)   Site Assessment Clean, dry, & intact 08/12/21 0916   Phlebitis Assessment 0 08/12/21 0916   Infiltration Assessment 0 08/12/21 0916   Dressing Status Clean, dry, & intact 08/12/21 0916   Dressing Type Tape;Transparent 08/12/21 0916   Hub Color/Line Status Pink;Capped 08/12/21 0916   Alcohol Cap Used Yes 08/12/21 0916        Opportunity for questions and clarification was provided.       Patient transported with:   Monitor  O2 @ bipap liters  Registered Nurse  [facility]

## 2021-08-13 NOTE — ROUTINE PROCESS
Called sister [address] [phone] and gave update on location of sister Phoenix Koo and that she is intubated. Carmen Thiago Zip at [phone] and gave update on location of granddaughter and that she is intubated.

## 2021-08-13 NOTE — ROUTINE PROCESS
TRANSFER - IN REPORT:    Verbal report received from BELEN Lara(name) on Kayla Granados  being received from the Emergency department(unit) for routine progression of care    Report consisted of patients Situation, Background, Assessment and   Recommendations(SBAR). Information from the following report(s) SBAR, Kardex, ED Summary, Procedure Summary, Intake/Output, MAR, Accordion, Recent Results, Med Rec Status, Cardiac Rhythm sinus rhythm, Alarm Parameters  and Quality Measures was reviewed with the receiving nurse. Opportunity for questions and clarification was provided. Assessment completed upon patients arrival to unit and care assumed. 0830:Transferred to CCU bed # [id number] [phone], noted with a low oxygen saturation. Dr.M. Gorman at the bedside  0845:See new orders for Pressure controlled ventilations, Fi02 100%, 20 cm PEEP, rate 28  0915:Her oxygenation has improved, and we are able to reposition her at this time. Preparing to have a Central and Arterial line placed. 1140: We attempted to return her Grandmother's call, but the phone number is a Non working number. Time out completed for an emergent placement of the Quad lumen and Arterial line  1155:Right IJ Quad lumen placed by Dr.M. Gorman without any challenges. 1205:Right radial arterial line initiated with an excellent waveform by   1240:ABG's noted by , planning to defer Proning. Otherwise, continue the current plan of care. 960 Cole Corey, Julianna's sister, called to receive updates on her condition. She stated that their Mother is at [facility] on life support also. Lili Jose De Jesus would like to defer all decisions to her Grandmother [phone].  1400:Suctioned for a small amount of blood tinge sputum, and repositioned to her left side. 1500: We received the corrected phone numbers for her Grandmother, and updates were given at this time. 1630:Suctioned for a scant amount of pink tinge sputum, and repositioned to her side.    1800: Updates given to Dr.M. Gorman, see new orders for SSI and LR infusion. 1930: Bedside and Verbal shift change report given to OMAR Angela RN (oncoming nurse) by myself (offgoing nurse). Report included the following information SBAR, Kardex, ED Summary, Procedure Summary, Intake/Output, MAR, Accordion, Recent Results, Med Rec Status, Cardiac Rhythm sinus bradycardia, Alarm Parameters , Pre Procedure Checklist and Quality Measures.

## 2021-08-13 NOTE — PROGRESS NOTES
Spoke to [place] RT after patient placed on BIpap. States Dr. Kei Araiza states patient can wait to be intubated so will monitor on Bipap.

## 2021-08-13 NOTE — PROGRESS NOTES
Clinical update after MDR    Manjit Garcia is a 29 y.o. female with asthma who is admitted for rapidly deteriorating COVID-19 PNA, now intubated, sedated.      - ECMO consulted as pt has had very poor trajectory  - will repeat ABG after vent optimized to see if pt meets criteria for proning  - cont deep sedation for vent synchrony while on LPV   -- 6cc/kg IBW   -- goal pO2 60-90, pH > 7.2  - increase steroids  - goal MAP > 65  - hold TFs today    Prince Dasilva MD  12:46 PM  Additional CCT 45m

## 2021-08-13 NOTE — PROCEDURES
[facility]      Procedure Note - Arterial Access:   Performed by Lizzie Conner MD.  Diagnosis: Hypotension requiring vasopressors  Insertion Date: 08/13/21   Time: 12:42 PM   Obtained Consent? no; emergent (NOK number non-working)  Procedure Location:  ICU. Immediately prior to the procedure, the patient was reevaluated and found suitable for the planned procedure and any planned medications. Immediately prior to the procedure a time out was called to verify the correct patient, procedure, equipment, staff, and marking as appropriate. Central line Bundle:  Full sterile barrier precautions used. Method: Seldinger technique. Site Prep: ChloraPrep and Sterile draping. Procedure: Arterial Catheter Insertion in Right, Radial Artery   Catheter inserted into a new site. Number of Attempts:  1 Indication: Monitoring and Blood Drawing. There was bright red, pulsatile blood return. Femoral Site? no. If Yes, reason femoral site was chosen: na  Catheter secured. Biopatch in place? yes. Sterile Bio-occlusive dressing placed. Complication None. The procedure was tolerated well.     Lizzie Conner MD   Critical Care Medicine  [facility]

## 2021-08-13 NOTE — PROGRESS NOTES
MD INFORMED OF PATIENT DECLINE- PATIENT ON NONREBREATHER IN 80S. MD WILL CONTACT TRANSPORT CENTER AND EMERGENCY DEPARTMENT. MD VERBAL OKAY TO COMPLETE ABG AND PLACE BIPAP ON PATIENT. 2334- RT IN ROOM TO PLACE BIPAP ON PATIENT.  PT IS 80% ON NONREBREATHER

## 2021-08-13 NOTE — ED NOTES
Pt arrives as a transfer from [facility], per report pt was admitted at [facility] on PCU. Pt is COVID +, pt started having difficulty breathing and placed on 2L NC then increased to 5L, NRB, and BIPAP without achieving O2 above 86%, pt intubated at [facility] prior to transfer, per EMS report pt was not abl to be placed on vent d/t complications. Pt arrived being bagged by EMS. For sedation pt received 200 fent, 30 etomidate, 80 rocc, and 100 succ. David Zimmerman MD at bedside at this time to evaluate pt. Pt placed x3 on monitor, bed in low position, wheels locked. Pt arrives on propofol 25mcg/kg/min    0457: per Santa Iqbal, NP wait for intervention for hypotension. Pt received IV push for sedation. Per NP give PRN BP med after backing off of propofol    0600: RT called about SpO2 87-89%, RT aware. This nurse requested breathing tx, RT stated she will come back    [phone]: Patient is being transferred to [facility] Critical Care 3, Room # [phone]. Report given to BELEN Hook on Елена Mask for routine progression of care. Report consisted of the following information SBAR, Kardex, ED Summary, Intake/Output, MAR, Recent Results and Cardiac Rhythm Sinus. Patient transferred to receiving unit by: BELEN Hook (RN or tech name). Outstanding consults needed: No     Next labs due: No     The following personal items will be sent with the patient during transfer to the floor:     All valuables:    Cardiac monitoring ordered: Yes    The following CURRENT information was reported to the receiving RN:    Code status: Full Code at time of transfer    Last set of vital signs:  Vital Signs  Level of Consciousness: (!) Responds to Pain (2) (08/13/21 0635)  Temp: (!) 100.8 °F (38.2 °C) (08/13/21 0635)  Temp Source: Rectal (08/13/21 0635)  Pulse (Heart Rate): 84 (08/13/21 0706)  Heart Rate Source: Monitor (08/13/21 0410)  Cardiac Rhythm: Sinus Rhythm (08/13/21 0603)  Resp Rate: 26 (08/13/21 0706)  BP: 100/61 (08/13/21 0655)  MAP (Monitor): 74 (08/13/21 7916)  MAP (Calculated): 74 (08/13/21 0655)  BP 1 Location: Right arm (08/13/21 0410)  BP 1 Method: Automatic (08/13/21 0410)  BP Patient Position: At rest (08/13/21 0410)  MEWS Score: 2 (08/13/21 3749)         Oxygen Therapy  O2 Sat (%): 90 % (08/13/21 0706)  Pulse via Oximetry: 84 beats per minute (08/13/21 0706)  O2 Device: Ventilator (08/13/21 0623)  FIO2 (%): 100 % (08/13/21 0623)      Last pain assessment:         Wounds: No     Urinary catheter: hooks  Is there a hooks order: Yes    LDAs:       Peripheral IV 08/13/21 Left Forearm (Active)   Site Assessment Clean, dry, & intact 08/13/21 0425   Phlebitis Assessment 0 08/13/21 0425   Infiltration Assessment 0 08/13/21 0425   Dressing Status Clean, dry, & intact 08/13/21 0425   Hub Color/Line Status Pink 08/13/21 0425     Orogastric Tube 08/13/21 (Active)   Site Assessment Clean, dry, & intact 08/13/21 0619   External Insertion Bao (cms) 46 cms 08/13/21 0619   Action Taken Placement verified (comment) 08/13/21 0619   Drainage Description Nancey Hams 08/13/21 0619     Airway - Endotracheal Tube 08/13/21 Oral (Active)   Insertion Depth (cm) 21 cm 08/13/21 0424   Line Bao Teeth 08/13/21 0424   Side Secured Centered;Device 08/13/21 0424   Site Assessment Clean, dry, & intact 08/13/21 0419       Opportunity for questions and clarification was provided.     Peter Olivo

## 2021-08-13 NOTE — PROCEDURES
[facility]      Procedure Note - Central Venous Access:   Performed by Brissa Zhao MD  Diagnosis: Hypotension requiring vasopressors  Insertion Date: 08/13/21  Time:12:39 PM  Obtained Consent? no; emergent (NOK number non-working)  Procedure Location:  ICU. Immediately prior to the procedure, the patient was reevaluated and found suitable for the planned procedure and any planned medications. Immediately prior to the procedure a time out was called to verify the correct patient, procedure, equipment, staff, and marking as appropriate. Central line Bundle:  Full sterile barrier precautions used. 7-Step Sterility Protocol followed. (cap, mask sterile gown, sterile gloves, large sterile sheet, hand hygiene, 2% chlorhexidine for cutaneous antisepsis)    Patient positioned in Trendelenburg?no   The site was prepped with ChloraPrep. Catheter inserted into a new site. Using Seldinger technique a Arrow Arabella Client CVC was placed in the Right, Internal Jugular Vein via direct cannulation with 1 number of attempts for Monitoring, Blood Drawing and IV Access. Ultrasound Guidance was utilized. There was good dark, non-pulsatile blood return in all ports. Femoral Site? no. If Yes, reason femoral site was chosen: na  Catheter secured. Biopatch in place? yes. Sterile Bio-occlusive dressing placed. The following complications were encountered: None. A follow-up chest x-ray was ordered post procedure. The procedure was tolerated well.         Brissa Zhao MD  Critical Care Medicine  [facility]

## 2021-08-13 NOTE — ED NOTES
Around 11:30 PM I received a call from the hospitalist who was inquiring regarding the protocol for getting his patient electively intubated before transfer. I communicated that we have anesthesiology on-call for elective intubation and asked hospitalist to call anesthesiology. Given single coverage in the ER, emergency physician responds to non-elective intubations (codes/crashing patient). Shortly after this I spoke to nursing supervisor and asked her to please page anesthesiology to get this patient intubated for Dr. Francine Wright. Per my subsequent conversations with Dr. Francine Wright, he had also requested that Anesthesiology be paged for intubation. Anesthesiology was not paged. Patient was started on bipap. I was called after 1 AM by receiving physician (Dr. Claire Solorzano) who also wanted assistance with getting the patient intubated. At this point anesthesiology was finally paged. 5 minutes after the page, he had not called back, and I was informed that the transfer team ([facility]) was upstairs ready to take the patient. I went upstairs to intubate the patient to facilitate timely transport to higher level of care. She was on BiPAP. She was mentating and understood that she was going to be intubated. PROCEDURE NOTE:  Patient was emergently intubated by this physician, Jessica Hernández MD for respiratory failure at approximately 1:30 AM.  Critical care transport was bedside ready to take patient to [facility] ED upon intubation. Patient was preoxygenated. Patient was given RSI with 30 etomidate and 100 succ. Patient was successfully intubated on first attempt with 7.5cuffed ETT using a 3 MAC blade and direct visualization of cords. Tube was visualized passing through the cords. There was condensation on the tube. Good breath sounds bilaterally. Good color change on end-tidal and good waveform on capnography.    Contacted Dr. Claire Solorzano at [facility] to ask her to do post intubation chest x-ray on patient's arrival to the ED.    FURTHER CRITICAL CARE:  Patient did not leave immediately after intubation because sats remained in the 70s/80s when she was placed on transport vent. I was called back up to the bedside 2 times. Spent a total of 90 minutes at the patient's bedside. Was called up the first time for post intubation sedation because patient was awoke and was fighting the tube. (LEONID had vec and versed but requested propofol drip, given concerns their EMS drugs would not be enough or they would run out.)  Patient was started on propofol drip with sats stable in the 80s. At this point I returned again to the ED. I was immediately called back up by [facility] because patient's sats were remaining in the high 70s, low 80s. I returned to the bedside and also told team to call hospitalist, Dr. Liudmila Sotomayor, who was managing the patient's care. Dr. Liudmila Sotomayor was available via [employer] and I was at the bedside. Cuff was repositioned because after patient previously awoke and fought the tube, she had decreased breath sounds on the left on re-auscultation. A portable bedside chest x-ray done shortly after repositioning showed tube in appropriate physician position. Suction was done through ET tube. Patient head was reposition to sniffing position as her neck had been hyperflexed. Durbin was placed to assist in diuresis. Patient was given Lasix. Patient was given albuterol and decadron. Patient was paralyzed with rocuronium. Transport teams' vent setting were adjusted with the assistance of Dr. Liudmila Sotomayor. Despite all of these interventions, patient's sats were remaining in the low 80s. Finally patient was taken off vent and bagged which brought her sats up to the high 80s, low 90s. She left for [facility] with the plan for the transport team to bag her until arrival at their facility.       CRITICAL CARE NOTE :        8:00 AM      IMPENDING DETERIORATION -Airway, Respiratory, Cardiovascular and CNS    ASSOCIATED RISK FACTORS - Hypotension, Shock, Hypoxia, Metabolic changes, Dehydration, Vascular Compromise and CNS Decompensation    MANAGEMENT- Bedside Assessment, Supervision of Care and Transfer    INTERPRETATION -  Blood Pressure and Cardiac Output Measures     INTERVENTIONS - hemodynamic mngmt, vent mngmt and Metobolic interventions    CASE REVIEW - Hospitalist/Intensivist and Nursing    TREATMENT RESPONSE -Improved    PERFORMED BY - Self        NOTES   :      I have spent 180 minutes of critical care time involved in lab review, consultations with specialist, family decision- making, bedside attention and documentation. During this entire length of time I was immediately available to the patient .     Kimberlyn Solis MD

## 2021-08-13 NOTE — ROUTINE PROCESS
Message sent to TELE MD    Patient with covid, awaiting transfer to higher level of care is being placed on BIPAP and is complaining of SEVERE ANXIETY. can we get something ordered for her anxiety? also emtala is being completed, MD will need to complete physican emtala section on flow sheet.     Success    A new connect request was successfully created for:  Obed West  : 1985  MRN: [id number]  ConnectID: [id number]  REASON:  Patient Declining  ACUITY:  10 Minutes  SUBMITTED:  2021 23:45 EDT

## 2021-08-13 NOTE — DISCHARGE SUMMARY
Discharge summary  Date of admission 8/11/2021  Date of discharge 8/13/2021    Discharge diagnosis  Acute hypoxic respiratory failure  COVID-19 positive  Bilateral pneumonia  Morbid obesity BMI 51    Condition at discharge: Guarded  Diet: NPO  Consults: None  Procedures: None    Meds:  Azithromycin 500 mg IV daily  Rocephin 1 g IV daily  Dexamethasone 6 mg daily  Lovenox 40 mg subcu daily  Pepcid 20 mg twice a day  Remdesivir 100 mg IV daily  Zinc 50 mg daily  Vitamin C 5 mg twice a day  Albuterol 4 times daily    Brief Hospital course  78-year-old female who was diagnosed with COVID-19 on 8/10/2021 presented to the ER on 8/11/2021 evening with increasing shortness of breath and chest pain. She reported dry cough mostly, nausea subjective fever and generalized weakness. She had been feeling ill for approximately 5 days. She stated her sister was also positive with Covid. She had not received her vaccine yet. Upon presentation she was 88% on room air. She was placed on 2 L nasal cannula. Lab work-up CBC was generally benign, troponin minimally elevated at 0.06, chest x-ray reviewed which did show significant bilateral infiltrates. Patient was admitted to hospital treated with aggressive COVID-19 treatment including remdesivir, dexamethasone antibiotics albuterol and vitamins. She unfortunately continued to decline requiring higher amount of oxygen throughout the day up to 5 L then in the evening requiring nonrebreather but was not maintaining her oxygenation. Patient was tachypneic, anxious was placed on BiPAP 100% with improvement in her oxygenation however remained tachypneic. She was given a total of 1 mg of Ativan. Patient was also ordered for Lasix. A repeat stat x-ray did show progression of bilateral infiltrates. It was clear patient would eventually exhaust her breathing and would require intubation. Patient was seen and evaluated remained tachypneic.   Decision to transfer was made and would need intubation prior to transport. I contacted transfer line and due to pandemic all local facilities, ICU or either on diversion or full. After exploring all local facilities, Patient was later graciously accepted to be transferred to [facility].  I did discuss with accepting physician. She was given Ativan to allow placement of Bipap. She did remove the bipap a few times due to anxiousness. After patient was evaluated again her respirations remained between 38-50 and decision to intubate to protect her airway and oxygenation for transport. Post intubation patient had hypoxia 80s and remained tachypnic. She was given a total of 80mg IV lasix, Rocuronium 80 mg with improvement in rate. Despite high peep and 100% fio2 she remained variable 02. We repositioned patient and post cxr showed ET tube in good position. Patient required manual bag ventilation to maintain oxygenation to 85-89%. She unfortunately has rapid progression of severe infiltrates due to COVID 19 and complicating her ventilation/hypoxia due to morbid obesity. Patient will be transferred to higher level for ICU care.  Condition: Critical

## 2021-08-13 NOTE — ED PROVIDER NOTES
EMERGENCY DEPARTMENT HISTORY AND PHYSICAL EXAM     ------------------------------------------------------------------------------------------------------  Please note that this dictation was completed with [employer], the [employer] voice recognition software. Quite often unanticipated grammatical, syntax, homophones, and other interpretive errors are inadvertently transcribed by the computer software. Please disregard these errors. Please excuse any errors that have escaped final proofreading.  -----------------------------------------------------------------------------------------------------------------    Date: 8/13/2021  Patient Name: Giuseppe Pandya    History of Presenting Illness     Chief Complaint   Patient presents with    Respiratory Distress     pt arrives via [facility] from with c/o respiratory distress and COVID+. History Provided By: Patient    HPI: Giuseppe Pandya is a 29 y.o. female, with significant pmhx of asthma, who presents via EMS  From [facility] inpatient to the ED with report of decompensation over the last 12 hours. Patient was initially admitted to [facility] [place] for COVID-19 pneumonia with hypoxia. It was initially on 2 L nasal cannula with increases to 5 L and then placed on nonrebreather at 10 L earlier in the evening. Patient continued to be hypoxic in the mid 80s and was transitioned to BiPAP per telemetry hospitalist.  It was at this time it was determined patient would likely need a higher level of care and transfer was initiated. Patient continued to be hypoxic in the mid 80s on BiPAP. It was discussed the patient needed intubation for airway protection and to prevent further decompensation during the transfer. Spoke with both the transfer center and Dr. Ricky Kingston of the emergency department in efforts to coordinate this patient's care.       HPI per OSH:  28-year-old female with a history of asthma presents with Covid symptoms since Saturday including shortness of breath, nonproductive cough, sneezing, nausea, vomiting, body aches. Was seen at [address] [place] [address] yesterday for the same symptoms and diagnosed with Covid. She was sent home where her breathing worsened over the last 24 hours. Reports fever. Known sick contacts-- several family members with Covid. Patient has tried using her inhaler without significant relief. Arrives hypoxic with sats 88% on room air        Past History     Past Medical History:  History reviewed. No pertinent past medical history. Past Surgical History:  History reviewed. No pertinent surgical history. Family History:  History reviewed. No pertinent family history. Social History:  Social History     Tobacco Use    Smoking status: Never Smoker    Smokeless tobacco: Never Used   Vaping Use    Vaping Use: Never used   Substance Use Topics    Alcohol use: Yes     Comment: occasionally    Drug use: No       Allergies: Allergies   Allergen Reactions    Lactose Nausea Only    Pcn [Penicillins] Itching     Patient unsure of penicillin allergy. Tolerates ceftriaxone         Review of Systems   Review of Systems   Unable to perform ROS: Intubated       Physical Exam   Physical Exam  Vitals and nursing note reviewed. Constitutional:       General: She is in acute distress. Appearance: She is obese. Interventions: She is intubated. HENT:      Head: Normocephalic and atraumatic. Nose: No nasal deformity. Mouth/Throat:      Lips: No lesions. Eyes:      Conjunctiva/sclera: Conjunctivae normal.   Cardiovascular:      Rate and Rhythm: Normal rate. Pulmonary:      Effort: Tachypnea present. She is intubated. Breath sounds: Decreased breath sounds present. Musculoskeletal:      Cervical back: Neck supple. No pain with movement. Right lower leg: No edema. Left lower leg: No edema. Comments: Moving upper extremities   Skin:     General: Skin is dry. Findings: No rash.            Diagnostic Study Results     Labs -     Recent Results (from the past 12 hour(s))   BLOOD GAS, ARTERIAL    Collection Time: 08/13/21 12:12 AM   Result Value Ref Range    pH 7.40 7.35 - 7.45      PCO2 37 35 - 45 mmHg    PO2 51 (L) 80 - 100 mmHg    O2 SAT 86 (L) 92 - 97 %    BICARBONATE 22 22 - 26 mmol/L    BASE DEFICIT 2.0 mmol/L    O2 METHOD NONREBREATHER MASK      FIO2 100 %    SPONTANEOUS RATE 40      Sample source ARTERIAL      SITE RIGHT RADIAL      KOKO'S TEST YES      Critical value read back Called to MIKKI Sims RN on 08/13/2021 at 00:16    POC G3 - PUL    Collection Time: 08/13/21  4:27 AM   Result Value Ref Range    FIO2 (POC) 100 %    pH (POC) 7.35 7.35 - 7.45      pCO2 (POC) 44.3 35.0 - 45.0 MMHG    pO2 (POC) 58 (L) 80 - 100 MMHG    HCO3 (POC) 24.7 22 - 26 MMOL/L    sO2 (POC) 88.3 (L) 92 - 97 %    Base deficit (POC) 1.1 mmol/L    Site RIGHT RADIAL      Device: ADULT VENT      Mode ASSIST CONTROL      Tidal volume 450 ml    Set Rate 22 bpm    PEEP/CPAP (POC) 15 cmH2O    Allens test (POC) Positive      Specimen type (POC) ARTERIAL         Radiologic Studies -   XR CHEST PORT    (Results Pending)     CT Results  (Last 48 hours)    None        CXR Results  (Last 48 hours)               08/13/21 0316  XR CHEST PORT Final result    Impression:   impression: ET tube in place. Narrative:  Clinical indication: Tube placement. Portable AP supine view of the upper chest show ET tube in place well the   tyler. Diffuse bilateral infiltrates. 08/13/21 0100  XR CHEST PORT Final result    Impression:  Significant worsening of bilateral infiltrates       Narrative:  Clinical indication: Respiratory failure. Portable upright view of the chest obtained and compared to August 12. significant worsening of bilateral infiltrates with diffuse opacification of   both lung fields at this time. 08/12/21 0114  XR CHEST PORT Final result    Impression:   impression: Bilateral patchy infiltrates. Narrative:  Clinical indication: Infiltrate. Portable AP upright view of the chest obtained, comparison November 2016. The   inspiration is shallow, the heart is enlarged. Bilateral patchy infiltrates. Medical Decision Making   I am the first provider for this patient. I reviewed the vital signs, available nursing notes, past medical history, past surgical history, family history and social history. Vital Signs-Reviewed the patient's vital signs. Patient Vitals for the past 12 hrs:   Temp Pulse Resp BP SpO2   08/13/21 0525 -- 82 21 (!) 76/50 92 %   08/13/21 0459 -- 88 22 (!) 70/41 92 %   08/13/21 0455 -- 88 22 (!) 72/42 91 %   08/13/21 0450 -- 88 23 (!) 73/47 90 %   08/13/21 0424 -- 98 28 -- 92 %   08/13/21 0421 -- 95 -- 125/75 92 %   08/13/21 0416 -- 98 -- -- 90 %   08/13/21 0413 -- 100 -- -- (!) 88 %   08/13/21 0412 -- (!) 101 -- -- (!) 86 %   08/13/21 0410 (!) 101.1 °F (38.4 °C) (!) 103 18 138/84 (!) 79 %         Provider Notes (Medical Decision Making): Impression:  Acute respiratory failure from COVID-19, ards    ICU CONSULT NOTE:   5:28 AM  Henry Moreland MD spoke with NP Kwan Wilkins,   Specialty: Critical Care  Consulted NP due to acute respiratory failure with COVID-19 and arts. Discussed pt's HPI and available diagnostic results thus far. Consultant at bedside to evaluate and manage patient  Henry Moreland MD    ADMISSION NOTE:  5:29 AM  Patient is being admitted to the hospital by NP [facility]. The results of their tests and reasons for their admission have been discussed with them and/or available family. They convey agreement and understanding for the need to be admitted and for their admission diagnosis.    Henry Moreland MD             Critical Care Time:       CRITICAL CARE NOTE  IMPENDING DETERIORATION -Airway, Respiratory, Cardiovascular, CNS and Metabolic  ASSOCIATED RISK FACTORS - Hypotension, Dysrhythmia, Metabolic changes, Dehydration, Vascular Compromise and CNS Decompensation  MANAGEMENT- Bedside Assessment and Supervision of Care  INTERPRETATION -  Xrays, Blood Gases, ECG and Blood Pressure  INTERVENTIONS - hemodynamic mngmt, vent mngmt, vascular control, Neurologic interventions  and Metobolic interventions  CASE REVIEW - Hospitalist/Intensivist  TREATMENT RESPONSE -Improved  PERFORMED BY - Self  NOTES   :  I have spent 30 minutes of critical care time involved in lab review, consultations with specialist, family decision- making, bedside attention and documentation excluding time spent on any separately billed procedures. During this entire length of time I was immediately available to the patient . Mehrdad Flores MD        Diagnosis     Clinical Impression:   1. Acute respiratory failure due to COVID-19 ([facility] [address]    2. ARDS (adult respiratory distress syndrome) ([facility])        PLAN:  1.  Admit to ICU

## 2021-08-13 NOTE — PROGRESS NOTES
Dr Mario Murillo called at [phone] per Dr. Sharif Mckeon to come and intubate patient for transfer to [facility]. Dr Sharif Mckeon on unit to intubate as Dr Mario Murillo has not responded. First call was to Dr Gabriela Evangelista who states Dr Evens Buck is  on call.

## 2021-08-13 NOTE — PROGRESS NOTES
As advised by Dr Soha Mohamud, BIPAP was to be placed on the patient for stabilization until the transport team came.

## 2021-08-14 NOTE — PROGRESS NOTES
0700  Report from [facility] RN    0800  Assessment complete  Patient desat's when suctioning slow to recover. Able  to move arms up towards tubes Restraints restarted. Patient does open eyes to verbal.      1900  End of Shift Note    Bedside shift change report given to Tommy Jack Mor (oncoming nurse) by Pastora Escobedo (offgoing nurse). Report included the following information SBAR, Kardex, Intake/Output, MAR, Recent Results and Cardiac Rhythm Sinus Rosemarie Kaye    Shift worked:  [id number]     Shift summary and any significant changes:     Patient opens eyes to verbal Coughs with suctioning and will desat have to give 100% O2 for a minute or so to get sat's back up otherwise has been tolerating the FiO2 at 60 %. Propofol 30 mcg Fentanyl 200 mcg. LR increased to 150 ml/hr. Hooks putting out holly/green urine. TF started late in shift at 5 ml/hr no advancement with water flushes 35 cc q 4 hrs. Restraints in place. No BM. Concerns for physician to address:  n/a     Zone phone for oncoming shift:   n/a       Activity:  Activity Level: Bed Rest  Number times ambulated in hallways past shift: 0  Number of times OOB to chair past shift: 0    Cardiac:   Cardiac Monitoring: Yes      Cardiac Rhythm: Sinus Skip    Access:   Current line(s): PIV and central line     Genitourinary:   Urinary status: voiding and hooks    Respiratory:   O2 Device: Endotracheal tube, Ventilator  Chronic home O2 use?: NO  Incentive spirometer at bedside: N/A     GI:  Last Bowel Movement Date: 08/12/21  Current diet:  DIET NPO  ADULT TUBE FEEDING Orogastric; 2.0 Calorie; Delivery Method: Continuous; Continuous Initial Rate (mL/hr): 5; Continuous Advance Tube Feeding: No; Water Flush Volume (mL): 35; Water Flush Frequency: Q 4 hours; Modulars/Additives: Protein; Specify H...   Passing flatus: NO  Tolerating current diet: YES       Pain Management:   Patient states pain is manageable on current regimen: YES    Skin:  Ant Score: 11  Interventions: float heels, internal/external urinary devices and nutritional support     Patient Safety:  Fall Score:  Total Score: 3  Interventions: bed/chair alarm       Length of Stay:  Expected LOS: 4d 19h  Actual LOS: [address]

## 2021-08-14 NOTE — PROGRESS NOTES
[facility]    ICU TEAM Progress Note    Name: Kayla Granados   : 1985   MRN: [id number]   Date: 2021           ICU Assessment & Plan of Care       Kayla Granados Is a 29 y.o. female with asthma who was  admitted for COVID-19 PNA. NEURO  Pain, agitation, sedation  - prop, fent currently for deep sedation + vent synchrony  - TGs every Mon/Thurs    CARDIAC  Hypotension due to drugs (iatrogenic)  Bradycardia due to drugs (iatrogenic)  - Levo for MAP goal > 65  - monitor HR; ok to 40s    RESPIRATORY  COVID-19 PNA  ARDS, moderate, improving  - does not meet criteria for proning currently  - have discussed ECMO, given trajectory, age, single-organ dysfunction -- currently monitoring course  - cont LPV with goal 6cc/kg IBW, plat < 30, DP<15, pH < 7.2, pO2 60-90  - s/p toci  - cont high-dose steroids    RENAL  Rhabdomyolysis  - cont strict I/Os with goal slightly negative daily  - will increase IVFs slightly to cont flush    GASTROINTESTINAL  At risk for malnutrition  - start trophic TFs today, along with prosource    HEMATOLOGIC  No acute issues  - standard transfusion triggers    ID  As above    ENDOCRINE  Hyperglycemia  - SSI for now, but will likely need to add long-acting given increase in steroids    MUSCULOSKELETAL  As above      DVT Prophylaxis: SCD, enoxaparin  U - Ulcer Prophylaxis: PPI  G - Glycemic Control: Insulin  B - Bowel Regimen: miralax  Tubes: ETT and Orogastric Tube  Lines: Peripheral IV, Arterial Line and Central Line  Drains: Durbin Catheter    Subjective:   Progress Note: 2021      Reason for ICU Admission: COVID-19 PNA     HPI: 29 y. o. female, with significant pmhx of asthma, who presents via  [facility] inpatient to the ED with report of decompensation over the last 12 hours due to covid PNA.  Over the course of the past 12 hours pt went from [place] to [facility] to intubation for refractory hypoxemia.     Upon arrival to [facility] ED pt SPO2 in the 70's; however, as per EMS during transport she had to be manually ventilated with ambu bag and they did not have a peep valve. Initial ABG 7.35/44/58/25. Placed on peep of 15 with 100% FiO2. Will be admitted to the ICU. Hospital course  8/13 - admitted to ICU. ECMO consult called, but declined as currently too stable. Vent optimized and did not meet criteria for proning. 8/14 - HEYDI overnight. POD:  * No surgery found *    S/P:       Home Medications:     Prior to Admission medications    Medication Sig Start Date End Date Taking? Authorizing Provider   lidocaine (LIDODERM) 5 % 1 Patch by TransDERmal route daily as needed (back pain). Apply patch to the affected area for 12 hours a day and remove for 12 hours a day. Provider, Sasha   ProAir HFA 90 mcg/actuation inhaler Take 2 Puffs by inhalation every four (4) hours as needed for Wheezing, Shortness of Breath or Cough. 7/30/21   Coreen Castrejon NP   fluticasone propionate (FLOVENT HFA) 110 mcg/actuation inhaler Take 1 Puff by inhalation every twelve (12) hours. 7/30/21   Coreen Castrejon NP   montelukast (SINGULAIR) 10 mg tablet Take 1 Tablet by mouth daily. 7/30/21   Coreen Castrejon NP   famotidine (PEPCID) 20 mg tablet Take 1 Tablet by mouth two (2) times a day. 7/30/21   Coreen Castrejon NP   albuterol (PROVENTIL VENTOLIN) 2.5 mg /3 mL (0.083 %) nebu Take 3 mL by inhalation every four (4) hours as needed for Wheezing, Shortness of Breath or Cough.  6/7/21   Coreen Castrejon NP       Current Meds:     Current Facility-Administered Medications   Medication Dose Route Frequency    propofol (DIPRIVAN) 10 mg/mL infusion  0-50 mcg/kg/min IntraVENous TITRATE    fentaNYL (PF) 1,500 mcg/30 mL (50 mcg/mL) infusion  0-200 mcg/hr IntraVENous TITRATE    sodium chloride (NS) flush 5-40 mL  5-40 mL IntraVENous Q8H    sodium chloride (NS) flush 5-40 mL  5-40 mL IntraVENous PRN    sodium chloride (NS) flush 5-40 mL  5-40 mL IntraVENous Q8H    sodium chloride (NS) flush 5-40 mL  5-40 mL IntraVENous PRN    acetaminophen (TYLENOL) suppository 650 mg  650 mg Rectal Q6H PRN    polyethylene glycol (MIRALAX) packet 17 g  17 g Oral DAILY PRN    ondansetron (ZOFRAN ODT) tablet 4 mg  4 mg Oral Q8H PRN    Or    ondansetron (ZOFRAN) injection 4 mg  4 mg IntraVENous Q6H PRN    acetaminophen (TYLENOL) solution 650 mg  650 mg Oral Q4H PRN    albuterol-ipratropium (DUO-NEB) 2.5 MG-0.5 MG/3 ML  3 mL Nebulization Q4H PRN    pantoprazole (PROTONIX) 40 mg in 0.9% sodium chloride 10 mL injection  40 mg IntraVENous DAILY    enoxaparin (LOVENOX) injection 40 mg  40 mg SubCUTAneous Q12H    dexamethasone (DECADRON) 20 mg in 0.9% sodium chloride 50 mL IVPB  20 mg IntraVENous Q24H    budesonide (PULMICORT) 250 mcg/2ml nebulizer susp  250 mcg Nebulization BID RT    EPINEPHrine HCl (PF) (ADRENALIN) 1 mg/mL (1 mL) injection 0.3 mg  0.3 mg IntraMUSCular ONCE PRN    diphenhydrAMINE (BENADRYL) injection 50 mg  50 mg IntraVENous ONCE PRN    methylPREDNISolone (PF) (Solu-MEDROL) injection 60 mg  60 mg IntraVENous ONCE PRN    glucose chewable tablet 16 g  4 Tablet Oral PRN    dextrose (D50W) injection syrg 12.5-25 g  25-50 mL IntraVENous PRN    glucagon (GLUCAGEN) injection 1 mg  1 mg IntraMUSCular PRN    insulin lispro (HUMALOG) injection   SubCUTAneous Q6H    lactated Ringers infusion  100 mL/hr IntraVENous CONTINUOUS       Objective:   Vital Signs:  Visit Vitals  /68   Pulse (!) 49   Temp 98.2 °F (36.8 °C)   Resp 26   Ht 5' 5\" (1.651 m)   Wt 151.9 kg (334 lb 14.1 oz)   SpO2 97%   BMI 55.73 kg/m²      O2 Device: Ventilator Temp (24hrs), Av.5 °F (36.9 °C), Min:97.6 °F (36.4 °C), Max:99.8 °F (37.7 °C)           Intake/Output:     Intake/Output Summary (Last 24 hours) at 2021 0724  Last data filed at 2021 0450  Gross per 24 hour   Intake 1594.58 ml   Output 3450 ml   Net -1855.42 ml       Physical Exam:  Lying in bed, appearing stated age, intubated, sedated, eyes open slightly to voice  ETT in place, RIJ CVC  Resps even and unlabored, symmetric chest rise on MV  Slightly bonnie rate, no heave/rub  Peripheral pulses intact  Abd soft, obese, nontender  Skin warm, dry    LABS AND  DATA: Personally reviewed  Recent Labs     08/14/21  0207 08/13/21  0510   WBC 3.0* 4.4   HGB 12.2 12.2   HCT 38.2 37.9    144*     Recent Labs     08/14/21  0207 08/13/21  0510    136   K 4.3 4.4   * 105   CO2 24 25   BUN 16 12   CREA 1.09* 1.33*   * 142*   CA 8.8 8.3*   MG 2.5* 2.0   PHOS 2.8 3.4     Recent Labs     08/14/21  0207 08/13/21  0510   AP 71 72  68   TP 7.9 8.0  7.9   ALB 2.7* 2.7*  2.7*   GLOB 5.2* 5.3*  5.2*     No results for input(s): INR, PTP, APTT, INREXT in the last 72 hours. Recent Labs     08/13/21  0554 08/13/21  0427   PHI 7.37 7.35   PCO2I 40.9 44.3   PO2I 60* 58*   FIO2I 100 100     Recent Labs     08/14/21  0207 08/13/21  0510 08/12/21  0048   CPK 8,911* 10,515*  --    TROIQ  --   --  0.06*       Hemodynamics:   PAP:   CO:     Wedge:   CI:     CVP:    SVR:       PVR:       Ventilator Settings:  Mode Rate Tidal Volume Pressure FiO2 PEEP   Pressure control   450 ml    80 % 18 cm H20     Peak airway pressure: 36 cm H2O    Minute ventilation: 10.4 l/min        MEDS: Reviewed    Chest X-Ray:  CXR Results  (Last 48 hours)               08/13/21 1306  XR CHEST PORT Final result    Impression:  1. Right IJ catheter tip overlies right atrium near the tricuspid valve and can   be retracted 5 cm. 2. The sidehole of the NG tube is in the distal esophagus and can be advanced. 3. Diffuse bilateral airspace disease is similar to study earlier today. Narrative:  EXAM: TEMPORARY       INDICATION:       COMPARISON: 8/13/2021       FINDINGS: A portable AP radiograph of the chest was obtained at 1209 hours. Right IJ catheter tip overlies right atrium near the tricuspid valve and can be   retracted 5 cm. ET tube and NG tube are unchanged.  Side hole of the NG tube is at the GE junction. . There is diffuse bilateral airspace disease which is stable   to slightly improved. There is no pneumothorax. Trula Blew Heart size is stable. . .            08/13/21 0915  XR CHEST PORT Final result    Impression:  Diffuse bilateral airspace disease is unchanged to slightly   progressed in the left upper lobe       Narrative:  EXAM:  XR CHEST PORT       INDICATION:  Low Oxygen Saturation       COMPARISON:  8/13/2021       FINDINGS: A portable AP radiograph of the chest was obtained at 847 hours. ET   tube is in satisfactory position. NG tube overlies the stomach. .  Diffuse   bilateral airspace disease is unchanged to slightly progressed in the left upper   lobe. .  Mild cardiomegaly is stable. . .            08/13/21 0451  XR CHEST PORT Final result    Impression:  Diffuse bilateral infiltrates, support devices as above. Narrative:  Clinical indication: Shortness of breath. Portable AP semiupright view of the chest is obtained, ET tube in place. NG tube   in the a proximal stomach. Diffuse bilateral infiltrates again demonstrated. Trula Blew 08/13/21 0316  XR CHEST PORT Final result    Impression:   impression: ET tube in place. Narrative:  Clinical indication: Tube placement. Portable AP supine view of the upper chest show ET tube in place well the   tyler. Diffuse bilateral infiltrates. 08/13/21 0100  XR CHEST PORT Final result    Impression:  Significant worsening of bilateral infiltrates       Narrative:  Clinical indication: Respiratory failure. Portable upright view of the chest obtained and compared to August 12. significant worsening of bilateral infiltrates with diffuse opacification of   both lung fields at this time.                   Multidisciplinary Rounds Completed:  No    ABCDEF Bundle/Checklist Completed:  Yes    SPECIAL EQUIPMENT  None    DISPOSITION  Stay in ICU    CRITICAL CARE CONSULTANT NOTE  I had a face to face encounter with the patient, reviewed and interpreted patient data including clinical events, labs, images, vital signs, I/O's, and examined patient. I have discussed the case and the plan and management of the patient's care with the consulting services, the bedside nurses and the respiratory therapist.      NOTE OF PERSONAL INVOLVEMENT IN CARE   This patient has a high probability of imminent, clinically significant deterioration, which requires the highest level of preparedness to intervene urgently. I participated in the decision-making and personally managed or directed the management of the following life and organ supporting interventions that required my frequent assessment to treat or prevent imminent deterioration. I personally spent 55 minutes of critical care time. This is time spent at this critically ill patient's bedside actively involved in patient care as well as the coordination of care. This does not include any procedural time which has been billed separately.     Rosa Hubbard MD  Intensivist  8/14/2021

## 2021-08-14 NOTE — PROGRESS NOTES
2030- Pt sedated, no restraints, deep sedation, propofol rate decreased to 30, bradycardia 48-50 HR, levo titrated to 3 to accommodate map goal, repositioned,  cc, advanced OG tube to 60, A-line optimal waveform. Discussed with RT to wean Fi02 given most recent ABG.  Will cont to monitor

## 2021-08-15 NOTE — PROGRESS NOTES
30 y/o admitted to ICU and intubated for COVID PNA.    1225am- Called to bedside because pt had self extubated. Pt was on 50 of propofol and 200 of fentanyl at time of self extubation. Prior to self extubation pt was requiring 100% FiO2 and 20 peep with SPO2 around 90%. Immediately after extubation, pt SPO2 was in the 40's. Pt manually ventilated with ambu bag and re-intubated by anaesthesia . Repeat CXR ordered. Pt dyssynchronous with vent causing persistent hypoxia. Versed gtt added and nimbex gtt started. SPO2 improved to 90% temporarily then decreased to the low 80's again. Started on inhaled epoprostenol.     Radha Lambert NP  [facility] Critical care  1:52 AM 8/15/21

## 2021-08-15 NOTE — PROGRESS NOTES
Intubation Note    Called to bedside secondary to  respiratory failure. Patient pre-oxygenated with 100% oxygen. Smooth RSI with Etomidate 20 mg     glidescope x1     7.5 ETT taped and secured at 21 cm at the teeth.    + Bilateral BS, + Chest rise, + ETCO2    CXR pending.     Care turned over to covering Attending MD.

## 2021-08-15 NOTE — PROGRESS NOTES
Assumed patient care,she's intubated and sedated on propofol at 30 mcg and fentanyl at 200 mcg with a RASS OF -1 TO 1 on wrist restraints,OG with trickle tube feeds and hooks patent with yellow,clear urine. Assessment completed per doc flow sheet and went up on sedation per [facility]  2200  Patient remains restless and desating down to mid 80's on 60% FiO2,NP and respiratory notified and orders given to increase FiO2 to 100% followed by ABG's orders carried out and propofol gtt rate increased. 0000  Patient self-extubated,oxygen saturation down to 40's,NP and respiratory at the bedside,patient bagged until anesthesia is at the bedside who re intubated the patient. Oxygen saturation up to the low 90's then back down the mid 80's ,patient started on versed and cisatracurium gtt for a RASS goal of -4,train of 4 initiated as well as epoprostenol for pulmonary hypertension. 0600  Oxygen saturation remains in the lower 90's,on fentanyl,versed,propofol,cisatracurium gtt,wrist restraints intact,hooks patent with adequate urine output,labs stable with no need for repletion. Bedside, Verbal and Written shift change report given to [id number] Abdiel Corey (oncoming nurse) by Bogdan Reynoso RN   (offgoing nurse). Report included the following information SBAR, Kardex and MAR.

## 2021-08-15 NOTE — PROGRESS NOTES
Patient is taped and ready to be place in prone position. The Respiratory therapist did discuss with the physician there concerns about the patient not having a good range of motion in neck. Patient has a short neck. The physician is aware of the patient neck and verbal order was giving to prone patient.

## 2021-08-15 NOTE — PROGRESS NOTES
[facility]    ICU TEAM Progress Note    Name: Ana Draper   : 1985   MRN: [id number]   Date: 8/15/2021           ICU Assessment & Plan of Care       Ana Draper Is a 29 y.o. female with asthma who was  admitted for COVID-19 PNA. NEURO  Pain, agitation, sedation  - prop, fent, versed gtts for deep sedation + nimbex for paralysis  - TGs every Mon/Thurs    CARDIAC  Hypotension due to drugs (iatrogenic)  Bradycardia due to drugs (iatrogenic)  - Levo prn for MAP goal > 65  - monitor HR; ok to 40s    RESPIRATORY  COVID-19 PNA  ARDS, severe, acute worsening  - will start proning today  - will have to consider restarting ECMO conversation based on response to proning  - in the meantime, cont LPV with goal 6cc/kg IBW, plat < 30, DP<15, pH < 7.2, pO2 60-90   -- currently ACPC, 285 (~4cc/kg due to plats) /  / 18 / 100%  - s/p toci  - cont high-dose steroids    RENAL  Rhabdomyolysis  - cont strict I/Os with goal slightly negative daily  - hold IVFs today as net pos  - start lasix 40 bid   - monitor CK while downtrending  - rising phos and K concerning for impending MADAI/renal failure in light of overnight's deterioration    GASTROINTESTINAL  At risk for malnutrition  - hold TFs today    HEMATOLOGIC  No acute issues  - standard transfusion triggers    ID  As above    ENDOCRINE   Hyperglycemia  - SSI for now, but will likely need to add long-acting given increase in steroids  - currently at goal 140-180    MUSCULOSKELETAL  As above      DVT Prophylaxis: SCD, enoxaparin  U - Ulcer Prophylaxis: PPI  G - Glycemic Control: Insulin  B - Bowel Regimen: miralax  Tubes: ETT and Orogastric Tube  Lines: Peripheral IV, Arterial Line and Central Line  Drains: Durbin Catheter    Subjective:   Progress Note: 8/15/2021      Reason for ICU Admission: COVID-19 PNA     HPI: 29 y. o. female, with significant pmhx of asthma, who presents via EMS [facility] inpatient to the ED with report of decompensation over the last 12 hours due to covid PNA.  Over the course of the past 12 hours pt went from NC to Bipap to intubation for refractory hypoxemia.     Upon arrival to [facility] ED pt SPO2 in the 70's; however, as per EMS during transport she had to be manually ventilated with ambu bag and they did not have a peep valve. Initial ABG 7.35/44/58/25. Placed on peep of 15 with 100% FiO2. Will be admitted to the ICU. Hospital course  8/13 - admitted to ICU. ECMO consult called, but declined as currently too stable. Vent optimized and did not meet criteria for proning. 8/14 - HEYDI overnight. 8/15 - had done well during the day with PEEP down to 18, Fi down to 60%. Desat overnight with Fi back up to 100%. Pt increasingly restless (on same sedation), followed by self-extubation. Re-intubated but no reserves with prolonged hypoxia in 40s. Now sedated, paralyzed, and on epo. POD:  * No surgery found *    S/P:       Home Medications:     Prior to Admission medications    Medication Sig Start Date End Date Taking? Authorizing Provider   lidocaine (LIDODERM) 5 % 1 Patch by TransDERmal route daily as needed (back pain). Apply patch to the affected area for 12 hours a day and remove for 12 hours a day. Provider, Historical   ProAir HFA 90 mcg/actuation inhaler Take 2 Puffs by inhalation every four (4) hours as needed for Wheezing, Shortness of Breath or Cough. 7/30/21   Emmett Perez NP   fluticasone propionate (FLOVENT HFA) 110 mcg/actuation inhaler Take 1 Puff by inhalation every twelve (12) hours. 7/30/21   Emmett Perez NP   montelukast (SINGULAIR) 10 mg tablet Take 1 Tablet by mouth daily. 7/30/21   Emmett Perez NP   famotidine (PEPCID) 20 mg tablet Take 1 Tablet by mouth two (2) times a day. 7/30/21   Emmett Perez NP   albuterol (PROVENTIL VENTOLIN) 2.5 mg /3 mL (0.083 %) nebu Take 3 mL by inhalation every four (4) hours as needed for Wheezing, Shortness of Breath or Cough.  6/7/21   Emmett Perez NP Current Meds:     Current Facility-Administered Medications   Medication Dose Route Frequency    dexmedeTOMidine in 0.9 % NaCl (PRECEDEX) 400 mcg/100 mL (4 mcg/mL) infusion soln  0.1-1.5 mcg/kg/hr IntraVENous TITRATE    midazolam (VERSED) 100 mg in 0.9% sodium chloride 100 mL infusion  0-10 mg/hr IntraVENous TITRATE    cisatracurium (NIMBEX) 200 mg in 0.9% sodium chloride 100 mL (2 mg/mL) infusion  0-10 mcg/kg/min IntraVENous TITRATE    epoprostenol (VELETRI) 30 mcg/mL in 0.9% sodium chloride 50 mL inhalation solution  30 ng/kg/min (Ideal) Inhalation CONTINUOUS    chlorhexidine (ORAL CARE KIT) 0.12 % mouthwash 15 mL  15 mL Oral Q12H    propofol (DIPRIVAN) 10 mg/mL infusion  0-50 mcg/kg/min IntraVENous TITRATE    fentaNYL (PF) 1,500 mcg/30 mL (50 mcg/mL) infusion  0-200 mcg/hr IntraVENous TITRATE    sodium chloride (NS) flush 5-40 mL  5-40 mL IntraVENous Q8H    sodium chloride (NS) flush 5-40 mL  5-40 mL IntraVENous PRN    sodium chloride (NS) flush 5-40 mL  5-40 mL IntraVENous Q8H    sodium chloride (NS) flush 5-40 mL  5-40 mL IntraVENous PRN    acetaminophen (TYLENOL) suppository 650 mg  650 mg Rectal Q6H PRN    polyethylene glycol (MIRALAX) packet 17 g  17 g Oral DAILY PRN    ondansetron (ZOFRAN ODT) tablet 4 mg  4 mg Oral Q8H PRN    Or    ondansetron (ZOFRAN) injection 4 mg  4 mg IntraVENous Q6H PRN    acetaminophen (TYLENOL) solution 650 mg  650 mg Oral Q4H PRN    albuterol-ipratropium (DUO-NEB) 2.5 MG-0.5 MG/3 ML  3 mL Nebulization Q4H PRN    pantoprazole (PROTONIX) 40 mg in 0.9% sodium chloride 10 mL injection  40 mg IntraVENous DAILY    enoxaparin (LOVENOX) injection 40 mg  40 mg SubCUTAneous Q12H    dexamethasone (DECADRON) 20 mg in 0.9% sodium chloride 50 mL IVPB  20 mg IntraVENous Q24H    budesonide (PULMICORT) 250 mcg/2ml nebulizer susp  250 mcg Nebulization BID RT    glucose chewable tablet 16 g  4 Tablet Oral PRN    dextrose (D50W) injection syrg 12.5-25 g  25-50 mL IntraVENous PRN    glucagon (GLUCAGEN) injection 1 mg  1 mg IntraMUSCular PRN    insulin lispro (HUMALOG) injection   SubCUTAneous Q6H    lactated Ringers infusion  150 mL/hr IntraVENous CONTINUOUS       Objective:   Vital Signs:  Visit Vitals  BP (!) 101/51   Pulse 79   Temp 98.5 °F (36.9 °C)   Resp 23   Ht 5' 5\" (1.651 m)   Wt 151.9 kg (334 lb 14.1 oz)   SpO2 91%   BMI 55.73 kg/m²      O2 Device: Endotracheal tube, Ventilator Temp (24hrs), Av.3 °F (36.8 °C), Min:97.4 °F (36.3 °C), Max:98.7 °F (37.1 °C)           Intake/Output:     Intake/Output Summary (Last 24 hours) at 8/15/2021 0729  Last data filed at 8/15/2021 0620  Gross per 24 hour   Intake 3166.55 ml   Output 1970 ml   Net 1196.55 ml       Physical Exam:  Lying in bed, appearing stated age, intubated, sedated, paralyzed  ETT in place, RIJ CVC  Resps even and unlabored, symmetric chest rise on MV  Reg rate, no heave/rub  Peripheral pulses intact  Abd soft, obese, nontender  Skin warm, dry    LABS AND  DATA: Personally reviewed  Recent Labs     08/15/21  02321   WBC 4.9 3.0*   HGB 11.2* 12.2   HCT 35.7 38.2    194     Recent Labs     08/15/21  0401 21  0207 21  0510 21  0510   NA  --  140  --  136   K  --  4.3  --  4.4   CL  --  110*  --  105   CO2  --  24  --  25   BUN  --  16  --  12   CREA  --  1.09*  --  1.33*   GLU  --  196*  --  142*   CA  --  8.8  --  8.3*   MG 2.1 2.5*   < > 2.0   PHOS 4.7 2.8   < > 3.4    < > = values in this interval not displayed. Recent Labs     21  0207 21  0510   AP 71 72  68   TP 7.9 8.0  7.9   ALB 2.7* 2.7*  2.7*   GLOB 5.2* 5.3*  5.2*     No results for input(s): INR, PTP, APTT, INREXT, INREXT in the last 72 hours.    Recent Labs     21  0554 21  0427   PHI 7.37 7.35   PCO2I 40.9 44.3   PO2I 60* 58*   FIO2I 100 100     Recent Labs     21  0207 21  0510   CPK 8,911* 10,515*       Hemodynamics:   PAP:   CO:     Wedge:   CI:     CVP: SVR:       PVR:       Ventilator Settings:  Mode Rate Tidal Volume Pressure FiO2 PEEP   Pressure control   450 ml    100 % 20 cm H20     Peak airway pressure: 35 cm H2O    Minute ventilation: 5.15 l/min        MEDS: Reviewed    Chest X-Ray:  CXR Results  (Last 48 hours)               08/15/21 0112  XR CHEST PORT Final result    Impression:  Satisfactory ET tube. Stable airspace disease. Narrative:  EXAM: Portable CXR. 0045 hours. COMPARISON: 8/14/2021, 2113 hours. INDICATION: ett placement       FINDINGS:   ET tube is satisfactory. NG tube remains in the stomach. Right IJ CVL tip   remains in the right atrium. Bilateral severe airspace disease is stable. There   is no pneumothorax. Heart size is normal.           08/14/21 2132  XR CHEST PORT Final result    Impression:  No significant change. Narrative:  INDICATION:  increasing O2 needs, COVID +       EXAM: CXR Portable. FINDINGS: Portable chest shows satisfactory support lines/devices without   significant change since yesterday. There is no apparent pneumothorax. Lungs   show persistent bilateral severe airspace disease. Heart size is top normal.   There is no midline shift.           08/13/21 1306  XR CHEST PORT Final result    Impression:  1. Right IJ catheter tip overlies right atrium near the tricuspid valve and can   be retracted 5 cm. 2. The sidehole of the NG tube is in the distal esophagus and can be advanced. 3. Diffuse bilateral airspace disease is similar to study earlier today. Narrative:  EXAM: TEMPORARY       INDICATION:       COMPARISON: 8/13/2021       FINDINGS: A portable AP radiograph of the chest was obtained at 1209 hours. Right IJ catheter tip overlies right atrium near the tricuspid valve and can be   retracted 5 cm. ET tube and NG tube are unchanged. Side hole of the NG tube is   at the GE junction. . There is diffuse bilateral airspace disease which is stable   to slightly improved.  There is no pneumothorax. Leydi Yin Heart size is stable. . .            08/13/21 0915  XR CHEST PORT Final result    Impression:  Diffuse bilateral airspace disease is unchanged to slightly   progressed in the left upper lobe       Narrative:  EXAM:  XR CHEST PORT       INDICATION:  Low Oxygen Saturation       COMPARISON:  8/13/2021       FINDINGS: A portable AP radiograph of the chest was obtained at 847 hours. ET   tube is in satisfactory position. NG tube overlies the stomach. .  Diffuse   bilateral airspace disease is unchanged to slightly progressed in the left upper   lobe. .  Mild cardiomegaly is stable. . . Multidisciplinary Rounds Completed:  No    ABCDEF Bundle/Checklist Completed:  Yes    SPECIAL EQUIPMENT  None    DISPOSITION  Stay in ICU    CRITICAL CARE CONSULTANT NOTE  I had a face to face encounter with the patient, reviewed and interpreted patient data including clinical events, labs, images, vital signs, I/O's, and examined patient. I have discussed the case and the plan and management of the patient's care with the consulting services, the bedside nurses and the respiratory therapist.      NOTE OF PERSONAL INVOLVEMENT IN CARE   This patient has a high probability of imminent, clinically significant deterioration, which requires the highest level of preparedness to intervene urgently. I participated in the decision-making and personally managed or directed the management of the following life and organ supporting interventions that required my frequent assessment to treat or prevent imminent deterioration. I personally spent 70 minutes of critical care time. This is time spent at this critically ill patient's bedside actively involved in patient care as well as the coordination of care. This does not include any procedural time which has been billed separately.     Twan Collins MD  Intensivist  8/15/2021

## 2021-08-15 NOTE — PROGRESS NOTES
Patient changed from [facility] to [facility] per physician's orders. Patient current settings are ac/vc 30/285/18/100%.

## 2021-08-15 NOTE — PROGRESS NOTES
Patient placed in prone position with et tube taped and pillow under patient head. The physician, nurses, and the respiratory therapist were at the bedside. Patient is prone for 16hrs.

## 2021-08-15 NOTE — PROGRESS NOTES
1900  End of Shift Note    Bedside shift change report given to Tommy Juarse (oncoming nurse) by Grey Seay (offgoing nurse). Report included the following information SBAR, Kardex, Procedure Summary, Intake/Output, MAR, Recent Results and Cardiac Rhythm SR    Shift worked:  [id number]     Shift summary and any significant changes Patient paralyzed on Nimbex Versed Propofol Fentanyl and LR. LR stopped patient did get lasix BID with good results. Vent at beginning of shift FiO2 100% decision to prone was made by Dr Roberto Madrid Patient made ready for proning at 1125 patient place in prone position. RRT several other RN PCT and MD at bedside. Good results FiO2 weaned to 60% and peep at 16 from 18. OGT in place Hooks in place  Right quad lumen in place A-line in place. After prone sat's % Orders for prone position for 16 hours  Head turned several times see flow sheet with assistance of RRT. Concerns for physician to address:  n/a     Zone phone for oncoming shift:   n/a       Activity:  Activity Level: Bed Rest  Number times ambulated in hallways past shift: 0  Number of times OOB to chair past shift: 0    Cardiac:   Cardiac Monitoring: Yes      Cardiac Rhythm: Sinus Rhythm    Access:   Current line(s): PIV and central line     Genitourinary:   Urinary status: voiding and hooks    Respiratory:   O2 Device: Endotracheal tube, Ventilator  Chronic home O2 use?: NO  Incentive spirometer at bedside: NO     GI:  Last Bowel Movement Date: 08/12/21  Current diet:  DIET NPO  Passing flatus: NO  Tolerating current diet: YES       Pain Management:   Patient states pain is manageable on current regimen: N/A    Skin:  Ant Score: 12  Interventions: float heels, foam dressing and internal/external urinary devices    Patient Safety:  Fall Score:  Total Score: 3  Interventions: bed/chair alarm       Length of Stay:  Expected LOS: 4d 19h  Actual LOS: [address]

## 2021-08-15 NOTE — PROGRESS NOTES
08/15/21 0650   ABCDEF Bundle   SBT Safety Screen Passed No   SBT Screen Reason for Failure FiO2 > 50%;PEEP > 7.5

## 2021-08-16 NOTE — PROGRESS NOTES
08/16/21 0624   ABCDEF Bundle   SBT Safety Screen Passed No   SBT Screen Reason for Failure FiO2 > 50%;PEEP > 7.5

## 2021-08-16 NOTE — PROGRESS NOTES
0700 got report from night shift nurse regarding patient condition and plan of care. 0700 Did the TOF check together since patient is in Nimbex (paralytic)    1115 Dr. Surya Frances round said that to trail for taking off Nimbex to see if patient will be okay without it. Try to lower down the nimbex, as long as the respiratory status is not compromise and patient is not destat, try to lower down the drip. Pharmacist said during round that when restart Nimbex, restart at the same rate that currently stop. Dr. Surya Frances also order to start tube feeding as well. 1120 check TOF and there was no twitching. 1141 I stop the Nimbex  1225 patient start moving extremities and waking up. Patient destat in the 79 and I restart the nimbex. I told Dr. Adilene Toscano and he said okay and to monitor patient. 1400 patient is being prone (prone for 16 hours and supine 8 hours)     1436 I checked TOF and there was no twitching, the I lower down the Nimbex drip from 3.5 to 3.0.     1450 so far patient is going good with 3.0 on Nimbex drip. NO respiratory compromise. I check the TOP and there was three twitches but that is okay according to Dr. Surya Frances as long as the patient is not destating. 550 Carmelo Chucho Frances stop by and ask if we can turn of the Nimbex and see how the patient is since the patient is prone. I stop the Nimbex and will continue to monitor patient. 1845 patient start to wake up and cough, so I have to restart Nimbex at the current rate that I stop it. 1900 Gave report to the nurse Vernon Barbour regarding patient condition, update information and what happen during day shift, and plan of care.

## 2021-08-16 NOTE — PROGRESS NOTES
Physician Progress Note      Brendon Parsons  CSN #:                  [id number]  :                       1985  ADMIT DATE:       2021 4:07 AM  DISCH DATE:  RESPONDING  PROVIDER #:        Radha Saul MD          QUERY TEXT:    Dr Mike Oh; Pt admitted with Covid 19 PNA, AHRF. and has shock  2/2 Covid documented. in the H&P. Septic shock documented by CSS PA long on . If possible, please document in progress notes and discharge summary further specificity regarding the type of shock: The medical record reflects the following:  Risk Factors: presents with COVID19 PNA, AHRF,  Clinical Indicators: shock noted, sats 70-80%, fever 101.0, tachycardia, hypotension  Treatment: Levophed gtt, intubation  Options provided:  -- Cardiogenic Shock  -- Septic Shock  -- Hypovolemic Shock  -- Other - I will add my own diagnosis  -- Disagree - Not applicable / Not valid  -- Disagree - Clinically unable to determine / Unknown  -- Refer to Clinical Documentation Reviewer    PROVIDER RESPONSE TEXT:    This patient is in septic shock. Query created by:  Fredrick Quintanilla on 2021 1:35 PM      Electronically signed by:  Radha Saul MD 2021 2:32 PM

## 2021-08-16 NOTE — PROGRESS NOTES
[facility]    ICU TEAM Progress Note    Name: Callie Urias   : 1985   MRN: [id number]   Date: 2021           ICU Assessment & Plan of Care       Callie Urias Is a 29 y.o. female with asthma who was  admitted for COVID-19 PNA. NEURO  Pain, agitation, sedation  - prop, fent, versed gtts for deep sedation + nimbex for paralysis  - TGs every Mon/Thurs. CARDIAC  Hypotension due to drugs (iatrogenic)  Bradycardia due to drugs (iatrogenic)  - Levo prn for MAP goal > 65  - monitor HR; ok to 40s    RESPIRATORY  COVID-19 PNA  ARDS, severe, acute worsening  - Prone ventilation. - will have to consider restarting ECMO conversation based on response to proning  - in the meantime, cont LPV with goal 6cc/kg IBW, plat < 30, DP<15, pH < 7.2, pO2 60-90  - currently ACPC, 285 (~4cc/kg due to plats) / 30 / 18 / 100%  - s/p toci  - cont high-dose steroids    RENAL  Rhabdomyolysis  - cont strict I/Os with goal slightly negative daily  - Lasix 40 bid     GASTROINTESTINAL  At risk for malnutrition  - Restart tube feeding. HEMATOLOGIC  No acute issues  - standard transfusion triggers    ID  As above    ENDOCRINE   Hyperglycemia  - SSI for now, but will likely need to add long-acting given increase in steroids  - currently at goal 140-180    MUSCULOSKELETAL  As above      DVT Prophylaxis: SCD, enoxaparin  U - Ulcer Prophylaxis: PPI  G - Glycemic Control: Insulin  B - Bowel Regimen: miralax  Tubes: ETT and Orogastric Tube  Lines: Peripheral IV, Arterial Line and Central Line  Drains: Durbin Catheter    Subjective:   Progress Note: 2021      Reason for ICU Admission: COVID-19 PNA     HPI: 29 y. o. female, with significant pmhx of asthma, who presents via EMS [facility] inpatient to the ED with report of decompensation over the last 12 hours due to covid PNA.  Over the course of the past 12 hours pt went from NC to Bipap to intubation for refractory hypoxemia.     Upon arrival to ED [facility] ED pt SPO2 in the 70's; however, as per EMS during transport she had to be manually ventilated with ambu bag and they did not have a peep valve. Initial ABG 7.35/44/58/25. Placed on peep of 15 with 100% FiO2. Will be admitted to the ICU. Hospital course  8/13 - admitted to ICU. ECMO consult called, but declined as currently too stable. Vent optimized and did not meet criteria for proning. 8/14 - HEYDI overnight. 8/15 - had done well during the day with PEEP down to 18, Fi down to 60%. Desat overnight with Fi back up to 100%. Pt increasingly restless (on same sedation), followed by self-extubation. Re-intubated but no reserves with prolonged hypoxia in 40s. Now sedated, paralyzed, and on epo.  8/16 - Patient was changed to supine position overnight and she had worsening hypoxemia and now on 100% Fio2. Home Medications:     Prior to Admission medications    Medication Sig Start Date End Date Taking? Authorizing Provider   lidocaine (LIDODERM) 5 % 1 Patch by TransDERmal route daily as needed (back pain). Apply patch to the affected area for 12 hours a day and remove for 12 hours a day. Provider, Sasha   ProAir HFA 90 mcg/actuation inhaler Take 2 Puffs by inhalation every four (4) hours as needed for Wheezing, Shortness of Breath or Cough. 7/30/21   Anup Gross NP   fluticasone propionate (FLOVENT HFA) 110 mcg/actuation inhaler Take 1 Puff by inhalation every twelve (12) hours. 7/30/21   Anup Gross NP   montelukast (SINGULAIR) 10 mg tablet Take 1 Tablet by mouth daily. 7/30/21   Anup Gross NP   famotidine (PEPCID) 20 mg tablet Take 1 Tablet by mouth two (2) times a day. 7/30/21   Anup Gross NP   albuterol (PROVENTIL VENTOLIN) 2.5 mg /3 mL (0.083 %) nebu Take 3 mL by inhalation every four (4) hours as needed for Wheezing, Shortness of Breath or Cough.  6/7/21   Anup Gross NP       Current Meds:     Current Facility-Administered Medications   Medication Dose Route Frequency    white petrolatum-mineral oiL (LACRILUBE S.O.P.) ointment   Both Eyes Q12H    NOREPINephrine (LEVOPHED) 8 mg in 5% dextrose 250mL (32 mcg/mL) infusion  0.5-16 mcg/min IntraVENous TITRATE    dexmedeTOMidine in 0.9 % NaCl (PRECEDEX) 400 mcg/100 mL (4 mcg/mL) infusion soln  0.1-1.5 mcg/kg/hr IntraVENous TITRATE    midazolam (VERSED) 100 mg in 0.9% sodium chloride 100 mL infusion  0-10 mg/hr IntraVENous TITRATE    cisatracurium (NIMBEX) 200 mg in 0.9% sodium chloride 100 mL (2 mg/mL) infusion  0-10 mcg/kg/min IntraVENous TITRATE    epoprostenol (VELETRI) 30 mcg/mL in 0.9% sodium chloride 50 mL inhalation solution  30 ng/kg/min (Ideal) Inhalation CONTINUOUS    furosemide (LASIX) injection 40 mg  40 mg IntraVENous BID    chlorhexidine (ORAL CARE KIT) 0.12 % mouthwash 15 mL  15 mL Oral Q12H    propofol (DIPRIVAN) 10 mg/mL infusion  0-50 mcg/kg/min IntraVENous TITRATE    fentaNYL (PF) 1,500 mcg/30 mL (50 mcg/mL) infusion  0-200 mcg/hr IntraVENous TITRATE    sodium chloride (NS) flush 5-40 mL  5-40 mL IntraVENous Q8H    sodium chloride (NS) flush 5-40 mL  5-40 mL IntraVENous PRN    sodium chloride (NS) flush 5-40 mL  5-40 mL IntraVENous Q8H    sodium chloride (NS) flush 5-40 mL  5-40 mL IntraVENous PRN    acetaminophen (TYLENOL) suppository 650 mg  650 mg Rectal Q6H PRN    polyethylene glycol (MIRALAX) packet 17 g  17 g Oral DAILY PRN    ondansetron (ZOFRAN ODT) tablet 4 mg  4 mg Oral Q8H PRN    Or    ondansetron (ZOFRAN) injection 4 mg  4 mg IntraVENous Q6H PRN    acetaminophen (TYLENOL) solution 650 mg  650 mg Oral Q4H PRN    albuterol-ipratropium (DUO-NEB) 2.5 MG-0.5 MG/3 ML  3 mL Nebulization Q4H PRN    pantoprazole (PROTONIX) 40 mg in 0.9% sodium chloride 10 mL injection  40 mg IntraVENous DAILY    enoxaparin (LOVENOX) injection 40 mg  40 mg SubCUTAneous Q12H    dexamethasone (DECADRON) 20 mg in 0.9% sodium chloride 50 mL IVPB  20 mg IntraVENous Q24H    budesonide (PULMICORT) 250 mcg/2ml nebulizer susp  250 mcg Nebulization BID RT    glucose chewable tablet 16 g  4 Tablet Oral PRN    dextrose (D50W) injection syrg 12.5-25 g  25-50 mL IntraVENous PRN    glucagon (GLUCAGEN) injection 1 mg  1 mg IntraMUSCular PRN    insulin lispro (HUMALOG) injection   SubCUTAneous Q6H       Objective:   Vital Signs:  Visit Vitals  BP (!) 112/53 (BP 1 Location: Left upper arm, BP Patient Position: At rest)   Pulse 69   Temp 98.3 °F (36.8 °C)   Resp 28   Ht 5' 5\" (1.651 m)   Wt 151.9 kg (334 lb 14.1 oz)   SpO2 95%   BMI 55.73 kg/m²      O2 Device: Ventilator Temp (24hrs), Av.2 °F (36.8 °C), Min:97.5 °F (36.4 °C), Max:99.2 °F (37.3 °C)           Intake/Output:     Intake/Output Summary (Last 24 hours) at 2021 0846  Last data filed at 2021 0502  Gross per 24 hour   Intake 1802.81 ml   Output 4625 ml   Net -2822.19 ml       Physical Exam:  Lying in bed, appearing stated age, intubated, sedated, paralyzed  ETT in place, RIJ CVC  Resps even and unlabored, symmetric chest rise on MV  Reg rate, no heave/rub  Peripheral pulses intact  Abd soft, obese, nontender  Skin warm, dry    LABS AND  DATA: Personally reviewed  Recent Labs     21  0156 08/15/21  0237   WBC 6.3 4.9   HGB 11.9 11.2*   HCT 37.5 35.7    163     Recent Labs     21  0156 08/15/21  0401    143   K 4.5 4.7    112*   CO2 27 24   BUN 23* 20   CREA 0.90 0.76   * 128*   CA 7.6* 7.8*   MG 2.3 2.1   PHOS 3.7 4.7     Recent Labs     21  0156 21  0207   AP 62 71   TP 7.2 7.9   ALB 2.4* 2.7*   GLOB 4.8* 5.2*     No results for input(s): INR, PTP, APTT, INREXT, INREXT in the last 72 hours. No results for input(s): PHI, PCO2I, PO2I, FIO2I in the last 72 hours.   Recent Labs     21  0156 21  0207   CPK 3,683* 8,911*       Hemodynamics:   PAP:   CO:     Wedge:   CI:     CVP:    SVR:       PVR:       Ventilator Settings:  Mode Rate Tidal Volume Pressure FiO2 PEEP   Assist control   270 ml    100 % 16 cm H20 Peak airway pressure: 33 cm H2O    Minute ventilation: 7.69 l/min        MEDS: Reviewed    Chest X-Ray:  CXR Results  (Last 48 hours)               08/15/21 0112  XR CHEST PORT Final result    Impression:  Satisfactory ET tube. Stable airspace disease. Narrative:  EXAM: Portable CXR. 0045 hours. COMPARISON: 8/14/2021, 2113 hours. INDICATION: ett placement       FINDINGS:   ET tube is satisfactory. NG tube remains in the stomach. Right IJ CVL tip   remains in the right atrium. Bilateral severe airspace disease is stable. There   is no pneumothorax. Heart size is normal.           08/14/21 2132  XR CHEST PORT Final result    Impression:  No significant change. Narrative:  INDICATION:  increasing O2 needs, COVID +       EXAM: CXR Portable. FINDINGS: Portable chest shows satisfactory support lines/devices without   significant change since yesterday. There is no apparent pneumothorax. Lungs   show persistent bilateral severe airspace disease. Heart size is top normal.   There is no midline shift. Multidisciplinary Rounds Completed:  No    ABCDEF Bundle/Checklist Completed:  Yes    SPECIAL EQUIPMENT  None    DISPOSITION  Stay in ICU    CRITICAL CARE CONSULTANT NOTE  I had a face to face encounter with the patient, reviewed and interpreted patient data including clinical events, labs, images, vital signs, I/O's, and examined patient. I have discussed the case and the plan and management of the patient's care with the consulting services, the bedside nurses and the respiratory therapist.      NOTE OF PERSONAL INVOLVEMENT IN CARE   This patient has a high probability of imminent, clinically significant deterioration, which requires the highest level of preparedness to intervene urgently.  I participated in the decision-making and personally managed or directed the management of the following life and organ supporting interventions that required my frequent assessment to treat or prevent imminent deterioration. I personally spent 40 minutes of critical care time. This is time spent at this critically ill patient's bedside actively involved in patient care as well as the coordination of care. This does not include any procedural time which has been billed separately.     Dominique Silverman MD  Intensivist  8/16/2021

## 2021-08-16 NOTE — PROGRESS NOTES
Patient placed in prone position with et tube taped for 16hrs. The nurses, pct, and rt was present in the room. Pillow was placed under patient head. Patient spo2 is 95%.

## 2021-08-16 NOTE — INTERDISCIPLINARY ROUNDS
Critical care interdisciplinary rounds held on 08/16/2021. Following members present, Pharmacy, Case Management, Respiratory Therapy, Physical Therapy, Clinical Lead and Nutrition. Led by KALE Dubose RN and Dr. Armin Hung. Plan of care discussed. See clinical pathway for plan of care and interventions and desired outcomes.

## 2021-08-16 NOTE — DIABETES MGMT
[address]    CLINICAL NURSE SPECIALIST CONSULT     Initial Presentation   Josiah Hernandez is a 29 y.o. female admitted 8/13/21 with dyspnea, nonproductive cough, sneezing, nausea, vomiting and body aches; diagnosed with COVID at [facility]. Fever+. Tachycardic. 02 sats 88%. LAB: Na 130. /AG 10. Creatinine 1.26/GFR 59. AST 82. Troponin elevated. Lactic acid Normal.   CXR: Bilateral patchy infiltrates    HX: History reviewed. No pertinent past medical history. Asthma    INITIAL DX: COVID pneumonia    Current Treatment     TX: Pulm meds. Steroids. Proning. Clot prevention. Diuresis. Insulin. GI protection. Sedation. TF    Consulted by Provider for advanced diabetes nursing assessment and care for:   [x] Inpatient management strategy    Hospital Course   Clinical progress has been complicated by need for ICU level care. 8/15/21 Desaturation overnight as patient had self-extubated. Versed infusion added and Nimbex started  CXR:   Bilateral severe airspace disease is stable. There is no pneumothorax. 8/16/21 Agitated. Hypotensive. Bradycardic. May need ECMO. On AC ventilatory support Fi02 100% Peep 16. Supine positioning overnight resulting in worsening hypoxemia    Diabetes History   Can not obtain at this time. Subjective   Intubated, sedated & paralyzed     Objective   Physical exam  General Obese AA female. Quite ill   Neuro  Sedated   Vital Signs Low grade fever.  Normotensive   Visit Vitals  BP (!) 103/58   Pulse 79   Temp 99 °F (37.2 °C)   Resp 26   Ht 5' 5\" (1.651 m)   Wt 151.9 kg (334 lb 14.1 oz)   SpO2 95%   BMI 55.73 kg/m²     Laboratory  Tests Today 8/13/21   A1c 6.3 4.4    142   Anion gap 7 6   Serum triglycerides 1607    WBC     Serum creatinine 0.9 1.33   GFR >60 55    186   ALT 58 46   Procalcitonin <0.05 0.32   CK 3683    Ferritin  1234     Factors impacting BG management  Factor Dose Comments   Nutrition:  Tube feeding    Two Ronen HN @ 15cc/hr  79 grams/24 hrs Not running at this time   Drugs:  Steroids   Dexamethasone 20mg Q24 hrs   Impairs insulin action   Pain Fentanyl infusion    Infection: COVID       Blood glucose pattern        Assessment and Plan   Nursing Diagnosis Risk for unstable blood glucose pattern   Nursing Intervention Domain 7553 Decision-making Support   Nursing Interventions Examined current inpatient diabetes control   Explored factors facilitating and impeding inpatient management     Evaluation   This obese AA female without a diagnosis of diabetes PTA. Admission BG of 141 and A1c of 6.7% indicates new onset of Type 2 diabetes, probably exacerbated by need for steroids in the treatment of asthma. During this hospitalization, the patient began steroid therapy on day of admission for COVID infection; corrective insulin approach has been employed. BGs in the 130s today. At this point, would stay with corrective insulin approach. Recommendations     [x] Use of Subcutaneous Insulin Order set (8155)  Insulin Dosing Specific recommendation   Basal                                      (Based on weight, BMI & GFR) []        0.2 units/kg/D  [] 0.3 units/kg/D  [] 0.4 units/kg/D NA   Nutritional                                      (Based on CHO/dextrose load)  NA   Corrective                                       (Useful in adjusting insulin dosing) [x] Insulin-resistant sensitivity      Billing Code(s)   [x] 93466 IP subsequent hospital care - 35 minutes     Before making these care recommendations, I personally reviewed the hospitalization record, including notes, laboratory & diagnostic data and current medications, and examined the patient at the bedside (circumstances permitting) before making care recommendations.      Total minutes: [address], CNS  Diabetes Clinical Nurse Specialist  [facility]  Access via [address] [place] [address]

## 2021-08-16 NOTE — ROUTINE PROCESS
Shift summary  Patient remains in prone position for 16hrs repositioned supine at 0400 and immediately desated down to lower 80's,ABG drown correlating, FIo2 adjusted to 100% and NP paged to the bedside. UP on nimbex and versed. 0700  SATs improved to mid 90's and patient appears stable TOF is 0 and B/P soft in the 09'O systolic,titrated sedation per MAR . Bedside, Verbal and Written shift change report given to Cha rn (oncoming nurse) by Marisa Cortez RN   (offgoing nurse). Report included the following information SBAR, Kardex and MAR.

## 2021-08-17 NOTE — PROGRESS NOTES
Spiritual Care Assessment/Progress Note  [facility]      NAME: Chetna Zapata      MRN: [id number]  AGE: 29 y.o. SEX: female  Anglican Affiliation: Worship   Language: English     8/17/2021     Total Time (in minutes): 8     Spiritual Assessment begun in [facility] CRITICAL CARE 3 through conversation with:         []Patient        [] Family    [] Friend(s)        Reason for Consult: Initial/Spiritual assessment, patient floor     Spiritual beliefs: (Please include comment if needed)     [] Identifies with a nita tradition:         [] Supported by a nita community:            [] Claims no spiritual orientation:           [] Seeking spiritual identity:                [] Adheres to an individual form of spirituality:           [x] Not able to assess:                           Identified resources for coping:      [] Prayer                               [] Music                  [] Guided Imagery     [] Family/friends                 [] Pet visits     [] Devotional reading                         [x] Unknown     [] Other:                                               Interventions offered during this visit: (See comments for more details)    Patient Interventions: Initial visit           Plan of Care:     [] Support spiritual and/or cultural needs    [] Support AMD and/or advance care planning process      [] Support grieving process   [] Coordinate Rites and/or Rituals    [] Coordination with community clergy   [] No spiritual needs identified at this time   [] Detailed Plan of Care below (See Comments)  [] Make referral to Music Therapy  [] Make referral to Pet Therapy     [] Make referral to Addiction services  [] Make referral to [facility]  [] Make referral to Spiritual Care Partner  [] No future visits requested        [x] Follow up upon further referrals     Comments:      made an initiative spiritual care visit attempt for Ms. Ildefonso Villegas in 2514. Patient is COVID positive, intubated.  talked with bedside RN Anival and debriefed with her how the patient is doing. According to her, patient is in status quo, her MPOA, mother, is admitted in the another hospital due to COVID-19. Advised of 24/7  availability.       [id number] [facility] Adriane Jose,Th.M, [address] Provider   Paging Service [phone] Hospital[facility]ALIA ([phone])

## 2021-08-17 NOTE — PROGRESS NOTES
08/17/21 0432   ABCDEF Bundle   SBT Safety Screen Passed No   SBT Screen Reason for Failure FiO2 > 50%;PEEP > 7.5

## 2021-08-17 NOTE — PROGRESS NOTES
Shift summary  Recieved patient sedated and paralyzed with a RASS of -4 to -5 on propofol, fentanyl, versed and nimbex gtt,on trickle tube feeds via OG,hooks catheter with adequate urine output. Assessment completed per Doc flow sheet,ABG drawn per NP orders and FIo2 decreased to 80% and rate down to 14,remained paralyzed with TOF between 30 and 50  at 0-2. low grade fever of 99 per axillary noted and treated with non-pharmacological measures. Attempted to reposition patient but unsuccessful as ETT seemed kicked with impaired advancement of inline. Remained proned all shift and tolerating well with sats in the mid to upper 90's  Had fever of 100.4 to axillary,tylenon given per rectal.    0721  Bedside, Verbal and Written shift change report given to Gracy RN (oncoming nurse) by Josh Huerta RN   (offgoing nurse). Report included the following information SBAR, Kardex and MAR.

## 2021-08-17 NOTE — PROGRESS NOTES
Transition of Care Plan:    RUR:  22%  Disposition:  TBD  Follow up appointments:  PCP  DME needed: TBD  Transportation at Discharge:  TBD  Keys or means to access home:  family      IM Medicare Letter: n/a  Is patient a BCPI-A Bundle: If yes, was Bundle Letter given?:   n/a  Caregiver Contact:  Sister - attempting to locate info  Discharge Caregiver contacted prior to discharge? Reason for Admission:   Pt is a 28 yo female transferred from [facility] after decompensating due to COVID PNA. Over 12 hour period, pt went from NC to BIPAP to intubation for hypoxemia. She is currently intubated in CCU after self-extubating on 8/15 and being reintubated. RUR Score:    22%              PCP: First and Last name:   Grazyna Yanes NP     Name of Practice:   [address] [place] [address]. Are you a current patient: Yes/No:   Yes   Approximate date of last visit:  7/30/21   Can you participate in a virtual visit if needed:   Yes    Do you (patient/family) have any concerns for transition/discharge? TBD               Plan for utilizing home health:   TBD    Current Advanced Directive/Advance Care Plan:  Full Code      Healthcare Decision Maker:   Click here to complete [address] including selection of the Healthcare Decision Maker Relationship (ie \"Primary\")            Pt's mother is listed as Emergency Contact in EMR but staff have been told by family that mother may also be hospitalized at this time and unable to serve as MPOA. GrandmotherLudiivna Guallpa [phone]  Sister: Richard Goldberg [phone]    Transition of Care Plan:                Pt remains in CCU on vent - requiring paralytic, sedation to prevent premature extubation. Proning is being attempted. María Elena Hogan is known about pt's functional baseline as she has been intubated since being transferred from [facility]. CM will contact grandmother and sister to complete assessment.    CM is following to assist w/ appropriate dc planning. Care Management Interventions  PCP Verified by CM: Yes  Palliative Care Criteria Met (RRAT>21 & CHF Dx)?: No  Mode of Transport at Discharge:  Other (see comment)  Transition of Care Consult (CM Consult): Discharge Planning  Discharge Durable Medical Equipment: No  Physical Therapy Consult: No  Occupational Therapy Consult: No  Speech Therapy Consult: No  Discharge Location  Discharge Placement: Unable to determine at this time     TERRI Ramon

## 2021-08-17 NOTE — DIABETES MGMT
[address] [place] [address]    CLINICAL NURSE SPECIALIST CONSULT     Initial Presentation   Callie Urias is a 29 y.o. female admitted 8/13/21 with dyspnea, nonproductive cough, sneezing, nausea, vomiting and body aches; diagnosed with COVID at [facility]. Fever+. Tachycardic. 02 sats 88%. LAB: Na 130. /AG 10. Creatinine 1.26/GFR 59. AST 82. Troponin elevated. Lactic acid Normal.   CXR: Bilateral patchy infiltrates    HX: History reviewed. No pertinent past medical history. Asthma    INITIAL DX: COVID pneumonia    Current Treatment     TX: Pulm meds. Steroids. Proning. Clot prevention. Diuresis. Insulin. GI protection. Sedation. TF    Consulted by Provider for advanced diabetes nursing assessment and care for:   [x] Inpatient management strategy    Hospital Course   Clinical progress has been complicated by need for ICU level care. 8/15/21 Desaturation overnight as patient had self-extubated. Versed infusion added and Nimbex started  CXR:   Bilateral severe airspace disease is stable. There is no pneumothorax. 8/16/21 Agitated. Hypotensive. Bradycardic. May need ECMO. On AC ventilatory support Fi02 100% Peep 16. Supine positioning overnight resulting in worsening hypoxemia  8/17/21 On PC ventilatory support. FiO2 100%. Peep 14. Paralyzed. On Nimbex, Fentanyl, Versed & Propofol infusions as well. Proning with significant issues with hypoxemia. Diabetes History   Can not obtain at this time. Subjective   Intubated, sedated & paralyzed     Objective   Physical exam  General Obese AA female. Quite ill   Neuro  Unresponsive   Vital Signs Low grade fever.  SR. Normotensive   Visit Vitals  BP (!) 116/55   Pulse 95   Temp 99.3 °F (37.4 °C)   Resp 28   Ht 5' 5\" (1.651 m)   Wt 151.9 kg (334 lb 14.1 oz)   SpO2 99%   BMI 55.73 kg/m²     Laboratory  Tests 8/17 8/16 8/13/21   A1c   6.7%  (8/14/21)    136 142   Anion gap 10 7 6   Serum triglycerides  1607    WBC 8.4 6.3 4.4   Serum creatinine 0.86 0.9 1. 33   GFR >60 >60 55    106 186   ALT 87 58 46   Procalcitonin  <0.05 0.32   CK 5830 3683    Ferritin   1234     Factors impacting BG management  Factor Dose Comments   Nutrition:  Tube feeding    Two Ronen HN @ 15cc/hr  79 grams/24 hrs   Running at 10cc/hr   Drugs:  Steroids   Dexamethasone 20mg Q24 hrs   Impairs insulin action   Pain Fentanyl infusion    Infection: COVID       Blood glucose pattern        Assessment and Plan   Nursing Diagnosis Risk for unstable blood glucose pattern   Nursing Intervention Domain 8390 Decision-making Support   Nursing Interventions Examined current inpatient diabetes control   Explored factors facilitating and impeding inpatient management     Evaluation   This obese AA female without a diagnosis of diabetes PTA. Admission BG of 141 and A1c of 6.7% indicates new onset of Type 2 diabetes, probably exacerbated by need for steroids in the treatment of asthma. During this hospitalization, the patient began steroid therapy on day of admission for COVID infection; corrective insulin approach has been employed. BGs in the 100-160s. At this point, would stay with corrective insulin approach. Recommendations     [x] Continue corrective insulin approach    Billing Code(s)   [x] 81732  subsequent hospital care - 25 minutes     Before making these care recommendations, I personally reviewed the hospitalization record, including notes, laboratory & diagnostic data and current medications, and examined the patient at the bedside (circumstances permitting) before making care recommendations.      Total minutes: [address] [facility], [facility]  Diabetes Clinical Nurse Specialist  Program for Diabetes [facility]  Access via [employer]

## 2021-08-17 NOTE — PROGRESS NOTES
Nutrition Note    Chart reviewed and case discussed during CCU rounds. Pt remains intubated and sedated with propofol providing 964 kcals/day. Plans are to decrease propofol d/t high triglyceride levels and increase other sedatives. Per MAR, it was weaning down but dosing increased again. Will continue monitoring. TF order updated. Increase TwoCal to 20mL/hr + Prosource Q 4hr + 100mL Q 4hr free water flush.   TF + prosource + current propofol will provide 2284 kcals, 130g protein, 936mL free water    When able to wean propofol completely, recommend goal rate of 40mL/hr + prosource Q 8hr + 100mL Q4hr free water flush  Will provide 2100 kcals, 125g protein, 1272mL    Estimated needs:  4921-0031 kcals (12-15 kcals/kg)  114-143g protein (2.0-2.5g/kg IBW)    Electronically signed by Trace Bella RD on 8/17/2021 at 2:23 PM    Contact: [phone]

## 2021-08-17 NOTE — PROGRESS NOTES
[facility]    ICU TEAM Progress Note    Name: Lauren Galdamez   : 1985   MRN: [id number]   Date: 2021           ICU Assessment & Plan of Care       Lauren Galdamez Is a 29 y.o. female with asthma who was  admitted for COVID-19 PNA. NEURO  Pain, agitation, sedation  - prop, fent, versed gtts for deep sedation + nimbex for paralysis  - TGs every Mon/Thurs. CARDIAC  Hypotension due to drugs (iatrogenic)  Bradycardia due to drugs (iatrogenic)  - Levo prn for MAP goal > 65  - monitor HR; ok to 40s    RESPIRATORY  COVID-19 PNA  ARDS, severe, acute worsening  - Prone ventilation. - will have to consider restarting ECMO conversation based on response to proning  - in the meantime, cont LPV with goal 6cc/kg IBW, plat < 30, DP<15, pH < 7.2, pO2 60-90  - currently ACPC, 285 (~4cc/kg due to plats) / 30 / 14 / 100%  - s/p toci  - cont high-dose steroids    RENAL  Rhabdomyolysis  - cont strict I/Os with goal slightly negative daily  - Lasix 40 bid     GASTROINTESTINAL  At risk for malnutrition  - Restart tube feeding. HEMATOLOGIC  No acute issues  - standard transfusion triggers    ID  As above    ENDOCRINE   Hyperglycemia  - SSI for now, but will likely need to add long-acting given increase in steroids  - currently at goal 140-180    MUSCULOSKELETAL  As above      DVT Prophylaxis: SCD, enoxaparin  U - Ulcer Prophylaxis: PPI  G - Glycemic Control: Insulin  B - Bowel Regimen: miralax  Tubes: ETT and Orogastric Tube  Lines: Peripheral IV, Arterial Line and Central Line  Drains: Durbin Catheter    Subjective:   Progress Note: 2021      Reason for ICU Admission: COVID-19 PNA     HPI: 29 y. o. female, with significant pmhx of asthma, who presents via EMS [facility] inpatient to the ED with report of decompensation over the last 12 hours due to covid PNA.  Over the course of the past 12 hours pt went from NC to Bipap to intubation for refractory hypoxemia.     Upon arrival to ED [facility] ED pt SPO2 in the 70's; however, as per EMS during transport she had to be manually ventilated with ambu bag and they did not have a peep valve. Initial ABG 7.35/44/58/25. Placed on peep of 15 with 100% FiO2. Will be admitted to the ICU. Hospital course  8/13 - admitted to ICU. ECMO consult called, but declined as currently too stable. Vent optimized and did not meet criteria for proning. 8/14 - HEYDI overnight. 8/15 - had done well during the day with PEEP down to 18, Fi down to 60%. Desat overnight with Fi back up to 100%. Pt increasingly restless (on same sedation), followed by self-extubation. Re-intubated but no reserves with prolonged hypoxia in 40s. Now sedated, paralyzed, and on epo.  8/16 - Patient was changed to supine position overnight and she had worsening hypoxemia and now on 100% Fio2. 8/17 - Patient again changed back to supine potion from prone position but had severe hypoxemic events which later improved with recruitment maneuvers. Still paralyzed with nimbex. Home Medications:     Prior to Admission medications    Medication Sig Start Date End Date Taking? Authorizing Provider   lidocaine (LIDODERM) 5 % 1 Patch by TransDERmal route daily as needed (back pain). Apply patch to the affected area for 12 hours a day and remove for 12 hours a day. Provider, Sasha   ProAir HFA 90 mcg/actuation inhaler Take 2 Puffs by inhalation every four (4) hours as needed for Wheezing, Shortness of Breath or Cough. 7/30/21   Grant Moulton NP   fluticasone propionate (FLOVENT HFA) 110 mcg/actuation inhaler Take 1 Puff by inhalation every twelve (12) hours. 7/30/21   Grant Moulton NP   montelukast (SINGULAIR) 10 mg tablet Take 1 Tablet by mouth daily. 7/30/21   Grant Moulton NP   famotidine (PEPCID) 20 mg tablet Take 1 Tablet by mouth two (2) times a day.  7/30/21   Grant Moulton NP   albuterol (PROVENTIL VENTOLIN) 2.5 mg /3 mL (0.083 %) nebu Take 3 mL by inhalation every four (4) hours as needed for Wheezing, Shortness of Breath or Cough.  6/7/21   Roge Gurrola NP       Current Meds:     Current Facility-Administered Medications   Medication Dose Route Frequency    white petrolatum-mineral oiL (LACRILUBE S.O.P.) ointment   Both Eyes Q12H    NOREPINephrine (LEVOPHED) 8 mg in 5% dextrose 250mL (32 mcg/mL) infusion  0.5-16 mcg/min IntraVENous TITRATE    enoxaparin (LOVENOX) injection 150 mg  150 mg SubCUTAneous Q12H    dexmedeTOMidine in 0.9 % NaCl (PRECEDEX) 400 mcg/100 mL (4 mcg/mL) infusion soln  0.1-1.5 mcg/kg/hr IntraVENous TITRATE    midazolam (VERSED) 100 mg in 0.9% sodium chloride 100 mL infusion  0-10 mg/hr IntraVENous TITRATE    cisatracurium (NIMBEX) 200 mg in 0.9% sodium chloride 100 mL (2 mg/mL) infusion  0-10 mcg/kg/min IntraVENous TITRATE    epoprostenol (VELETRI) 30 mcg/mL in 0.9% sodium chloride 50 mL inhalation solution  30 ng/kg/min (Ideal) Inhalation CONTINUOUS    furosemide (LASIX) injection 40 mg  40 mg IntraVENous BID    chlorhexidine (ORAL CARE KIT) 0.12 % mouthwash 15 mL  15 mL Oral Q12H    propofol (DIPRIVAN) 10 mg/mL infusion  0-50 mcg/kg/min IntraVENous TITRATE    fentaNYL (PF) 1,500 mcg/30 mL (50 mcg/mL) infusion  0-200 mcg/hr IntraVENous TITRATE    sodium chloride (NS) flush 5-40 mL  5-40 mL IntraVENous Q8H    sodium chloride (NS) flush 5-40 mL  5-40 mL IntraVENous PRN    sodium chloride (NS) flush 5-40 mL  5-40 mL IntraVENous Q8H    sodium chloride (NS) flush 5-40 mL  5-40 mL IntraVENous PRN    acetaminophen (TYLENOL) suppository 650 mg  650 mg Rectal Q6H PRN    polyethylene glycol (MIRALAX) packet 17 g  17 g Oral DAILY PRN    ondansetron (ZOFRAN ODT) tablet 4 mg  4 mg Oral Q8H PRN    Or    ondansetron (ZOFRAN) injection 4 mg  4 mg IntraVENous Q6H PRN    acetaminophen (TYLENOL) solution 650 mg  650 mg Oral Q4H PRN    albuterol-ipratropium (DUO-NEB) 2.5 MG-0.5 MG/3 ML  3 mL Nebulization Q4H PRN    pantoprazole (PROTONIX) 40 mg in 0.9% sodium chloride 10 mL injection  40 mg IntraVENous DAILY    dexamethasone (DECADRON) 20 mg in 0.9% sodium chloride 50 mL IVPB  20 mg IntraVENous Q24H    budesonide (PULMICORT) 250 mcg/2ml nebulizer susp  250 mcg Nebulization BID RT    glucose chewable tablet 16 g  4 Tablet Oral PRN    dextrose (D50W) injection syrg 12.5-25 g  25-50 mL IntraVENous PRN    glucagon (GLUCAGEN) injection 1 mg  1 mg IntraMUSCular PRN    insulin lispro (HUMALOG) injection   SubCUTAneous Q6H       Objective:   Vital Signs:  Visit Vitals  BP (!) 104/50   Pulse 78   Temp 99.3 °F (37.4 °C)   Resp 28   Ht 5' 5\" (1.651 m)   Wt 151.9 kg (334 lb 14.1 oz)   SpO2 100%   BMI 55.73 kg/m²    O2 Flow Rate (L/min): 5 l/min O2 Device: Ventilator Temp (24hrs), Av.2 °F (37.3 °C), Min:98.6 °F (37 °C), Max:100.9 °F (38.3 °C)           Intake/Output:     Intake/Output Summary (Last 24 hours) at 2021 0924  Last data filed at 2021 0600  Gross per 24 hour   Intake 1852.38 ml   Output 2800 ml   Net -947.62 ml       Physical Exam:  Lying in bed, appearing stated age, intubated, sedated, paralyzed  ETT in place, RIJ CVC  Resps even and unlabored, symmetric chest rise on MV  Reg rate, no heave/rub  Peripheral pulses intact  Abd soft, obese, nontender  Skin warm, dry    LABS AND  DATA: Personally reviewed  Recent Labs     216   WBC 8.4 6.3   HGB 12.9 11.9   HCT 39.3 37.5    184     Recent Labs     21    140   K 4.5 4.5    106   CO2 26 27   BUN 28* 23*   CREA 0.86 0.90   * 136*   CA 7.7* 7.6*   MG 2.4 2.3   PHOS 3.3 3.7     Recent Labs     08/17/21  0019 08/16/21  0156   AP 68 62   TP 7.6 7.2   ALB 2.5* 2.4*   GLOB 5.1* 4.8*     No results for input(s): INR, PTP, APTT, INREXT, INREXT in the last 72 hours. No results for input(s): PHI, PCO2I, PO2I, FIO2I in the last 72 hours.   Recent Labs     21  0019 21  0156   CPK 5,830* 3,683*       Hemodynamics:   PAP:   CO:     Wedge: CI:     CVP:    SVR:       PVR:       Ventilator Settings:  Mode Rate Tidal Volume Pressure FiO2 PEEP   Assist control   270 ml    80 % 14 cm H20     Peak airway pressure: 32 cm H2O    Minute ventilation: 7.65 l/min        MEDS: Reviewed    Chest X-Ray:  CXR Results  (Last 48 hours)    None          Multidisciplinary Rounds Completed:  No    ABCDEF Bundle/Checklist Completed:  Yes    SPECIAL EQUIPMENT  None    DISPOSITION  Stay in ICU    CRITICAL CARE CONSULTANT NOTE  I had a face to face encounter with the patient, reviewed and interpreted patient data including clinical events, labs, images, vital signs, I/O's, and examined patient. I have discussed the case and the plan and management of the patient's care with the consulting services, the bedside nurses and the respiratory therapist.      NOTE OF PERSONAL INVOLVEMENT IN CARE   This patient has a high probability of imminent, clinically significant deterioration, which requires the highest level of preparedness to intervene urgently. I participated in the decision-making and personally managed or directed the management of the following life and organ supporting interventions that required my frequent assessment to treat or prevent imminent deterioration. I personally spent 40 minutes of critical care time. This is time spent at this critically ill patient's bedside actively involved in patient care as well as the coordination of care. This does not include any procedural time which has been billed separately.     Joel Rodarte MD  Intensivist  8/17/2021

## 2021-08-18 NOTE — PROGRESS NOTES
08/18/21 0624   ABCDEF Bundle   SBT Safety Screen Passed No   SBT Screen Reason for Failure FiO2 > 50%;PEEP > 7.5

## 2021-08-18 NOTE — PROGRESS NOTES
[facility]    ICU TEAM Progress Note    Name: Brigida Lentz   : 1985   MRN: [id number]   Date: 2021           ICU Assessment & Plan of Care       Brigida Lentz Is a 29 y.o. female with asthma who was  admitted for COVID-19 PNA. NEURO  Pain, agitation, sedation  - prop, fent, versed gtts for deep sedation + nimbex for paralysis  - TGs every Mon/Thurs. CARDIAC  Hypotension due to drugs (iatrogenic)  Bradycardia due to drugs (iatrogenic)  - Levo prn for MAP goal > 65  - monitor HR; ok to 40s    RESPIRATORY  COVID-19 PNA  ARDS, severe, acute worsening  - Prone ventilation. - will have to consider restarting ECMO conversation based on response to proning but not a good candidate due to BMI of 55.  - in the meantime, cont LPV with goal 6cc/kg IBW, plat < 30, DP<15, pH < 7.2, pO2 60-90  - currently ACPC, 285 (~4cc/kg due to plats) /  / 14 / 100%  - s/p toci  - cont high-dose steroids    RENAL  Rhabdomyolysis  - cont strict I/Os with goal slightly negative daily  - Increase lasix 60mg TID, give bumex 2mg today. GASTROINTESTINAL  At risk for malnutrition  - Continue tube feeding. HEMATOLOGIC  No acute issues  - standard transfusion triggers    ID  As above    ENDOCRINE   Hyperglycemia  - SSI for now, but will likely need to add long-acting given increase in steroids  - currently at goal 140-180    MUSCULOSKELETAL  As above      DVT Prophylaxis: SCD, enoxaparin  U - Ulcer Prophylaxis: PPI  G - Glycemic Control: Insulin  B - Bowel Regimen: miralax  Tubes: ETT and Orogastric Tube  Lines: Peripheral IV, Arterial Line and Central Line  Drains: Durbin Catheter    Subjective:   Progress Note: 2021      Reason for ICU Admission: COVID-19 PNA     HPI: 29 y. o. female, with significant pmhx of asthma, who presents via EMS [facility] inpatient to the ED with report of decompensation over the last 12 hours due to covid PNA.  Over the course of the past 12 hours pt went from NC to Bipap to intubation for refractory hypoxemia.     Upon arrival to [address] [place] [address] ED pt SPO2 in the 70's; however, as per EMS during transport she had to be manually ventilated with ambu bag and they did not have a peep valve. Initial ABG 7.35/44/58/25. Placed on peep of 15 with 100% FiO2. Will be admitted to the ICU. Hospital course  8/13 - admitted to ICU. ECMO consult called, but declined as currently too stable. Vent optimized and did not meet criteria for proning. 8/14 - HEYDI overnight. 8/15 - had done well during the day with PEEP down to 18, Fi down to 60%. Desat overnight with Fi back up to 100%. Pt increasingly restless (on same sedation), followed by self-extubation. Re-intubated but no reserves with prolonged hypoxia in 40s. Now sedated, paralyzed, and on epo.  8/16 - Patient was changed to supine position overnight and she had worsening hypoxemia and now on 100% Fio2. 8/17 - Patient again changed back to supine potion from prone position but had severe hypoxemic events which later improved with recruitment maneuvers. Still paralyzed with nimbex. 8/18. Weaned off of nimbex. Was in prone position overnight. On 100% Fio2 and PEEP of 18cm of water. Peak pressure ~32, pltp is 29. Home Medications:     Prior to Admission medications    Medication Sig Start Date End Date Taking? Authorizing Provider   lidocaine (LIDODERM) 5 % 1 Patch by TransDERmal route daily as needed (back pain). Apply patch to the affected area for 12 hours a day and remove for 12 hours a day. Provider, Historical   ProAir HFA 90 mcg/actuation inhaler Take 2 Puffs by inhalation every four (4) hours as needed for Wheezing, Shortness of Breath or Cough. 7/30/21   Eileen Salas NP   fluticasone propionate (FLOVENT HFA) 110 mcg/actuation inhaler Take 1 Puff by inhalation every twelve (12) hours. 7/30/21   Eileen Salas NP   montelukast (SINGULAIR) 10 mg tablet Take 1 Tablet by mouth daily.  7/30/21   Eileen Salas NP famotidine (PEPCID) 20 mg tablet Take 1 Tablet by mouth two (2) times a day. 7/30/21   Roge Gurrola NP   albuterol (PROVENTIL VENTOLIN) 2.5 mg /3 mL (0.083 %) nebu Take 3 mL by inhalation every four (4) hours as needed for Wheezing, Shortness of Breath or Cough.  6/7/21   Roge Gurrola NP       Current Meds:     Current Facility-Administered Medications   Medication Dose Route Frequency    balsam peru-castor oiL (VENELEX) ointment   Topical BID    clonazePAM (KlonoPIN) tablet 2 mg  2 mg Per G Tube TID    oxyCODONE (ROXICODONE) 5 mg/5 mL oral solution 30 mg  30 mg Per G Tube Q8H    ketamine (KETALAR) 500 mg in 0.9% sodium chloride 500 mL infusion  0.05-0.2 mg/kg/hr IntraVENous TITRATE    LORazepam (ATIVAN) injection 4 mg  4 mg IntraVENous Q4H PRN    white petrolatum-mineral oiL (LACRILUBE S.O.P.) ointment   Both Eyes Q12H    NOREPINephrine (LEVOPHED) 8 mg in 5% dextrose 250mL (32 mcg/mL) infusion  0.5-16 mcg/min IntraVENous TITRATE    enoxaparin (LOVENOX) injection 150 mg  150 mg SubCUTAneous Q12H    dexmedeTOMidine in 0.9 % NaCl (PRECEDEX) 400 mcg/100 mL (4 mcg/mL) infusion soln  0.1-1.5 mcg/kg/hr IntraVENous TITRATE    midazolam (VERSED) 100 mg in 0.9% sodium chloride 100 mL infusion  0-15 mg/hr IntraVENous TITRATE    cisatracurium (NIMBEX) 200 mg in 0.9% sodium chloride 100 mL (2 mg/mL) infusion  0-10 mcg/kg/min IntraVENous TITRATE    epoprostenol (VELETRI) 30 mcg/mL in 0.9% sodium chloride 50 mL inhalation solution  30 ng/kg/min (Ideal) Inhalation CONTINUOUS    furosemide (LASIX) injection 40 mg  40 mg IntraVENous BID    chlorhexidine (ORAL CARE KIT) 0.12 % mouthwash 15 mL  15 mL Oral Q12H    propofol (DIPRIVAN) 10 mg/mL infusion  0-50 mcg/kg/min IntraVENous TITRATE    fentaNYL (PF) 1,500 mcg/30 mL (50 mcg/mL) infusion  0-200 mcg/hr IntraVENous TITRATE    sodium chloride (NS) flush 5-40 mL  5-40 mL IntraVENous Q8H    sodium chloride (NS) flush 5-40 mL  5-40 mL IntraVENous PRN    sodium chloride (NS) flush 5-40 mL  5-40 mL IntraVENous Q8H    sodium chloride (NS) flush 5-40 mL  5-40 mL IntraVENous PRN    acetaminophen (TYLENOL) suppository 650 mg  650 mg Rectal Q6H PRN    polyethylene glycol (MIRALAX) packet 17 g  17 g Oral DAILY PRN    ondansetron (ZOFRAN ODT) tablet 4 mg  4 mg Oral Q8H PRN    Or    ondansetron (ZOFRAN) injection 4 mg  4 mg IntraVENous Q6H PRN    acetaminophen (TYLENOL) solution 650 mg  650 mg Oral Q4H PRN    albuterol-ipratropium (DUO-NEB) 2.5 MG-0.5 MG/3 ML  3 mL Nebulization Q4H PRN    pantoprazole (PROTONIX) 40 mg in 0.9% sodium chloride 10 mL injection  40 mg IntraVENous DAILY    dexamethasone (DECADRON) 20 mg in 0.9% sodium chloride 50 mL IVPB  20 mg IntraVENous Q24H    budesonide (PULMICORT) 250 mcg/2ml nebulizer susp  250 mcg Nebulization BID RT    glucose chewable tablet 16 g  4 Tablet Oral PRN    dextrose (D50W) injection syrg 12.5-25 g  25-50 mL IntraVENous PRN    glucagon (GLUCAGEN) injection 1 mg  1 mg IntraMUSCular PRN    insulin lispro (HUMALOG) injection   SubCUTAneous Q6H       Objective:   Vital Signs:  Visit Vitals  /60   Pulse 69   Temp 98.8 °F (37.1 °C)   Resp 28   Ht 5' 5\" (1.651 m)   Wt 151.9 kg (334 lb 14.1 oz)   SpO2 (!) 89% Comment: Simultaneous filing. User may not have seen previous data.    BMI 55.73 kg/m²    O2 Flow Rate (L/min): 5 l/min O2 Device: Ventilator Temp (24hrs), Av.1 °F (37.3 °C), Min:98.6 °F (37 °C), Max:99.8 °F (37.7 °C)           Intake/Output:     Intake/Output Summary (Last 24 hours) at 2021 0910  Last data filed at 2021 0800  Gross per 24 hour   Intake 2577.7 ml   Output 850 ml   Net 1727.7 ml       Physical Exam:  Lying in bed, appearing stated age, intubated, sedated, paralyzed  ETT in place, RIJ CVC  Resps even and unlabored, symmetric chest rise on MV  Reg rate, no heave/rub  Peripheral pulses intact  Abd soft, obese, nontender  Skin warm, dry    LABS AND  DATA: Personally reviewed  Recent Labs 08/17/21 2355 08/17/21  0019   WBC 8.9 8.4   HGB 13.0 12.9   HCT 41.0 39.3    231     Recent Labs     08/17/21 2355 08/17/21  0019    140   K 4.5 4.5    104   CO2 30 26   BUN 26* 28*   CREA 0.91 0.86   * 162*   CA 8.1* 7.7*   MG 2.6* 2.4   PHOS 2.4* 3.3     Recent Labs     08/17/21 2355 08/17/21  0019   AP 63 68   TP 8.0 7.6   ALB 2.8* 2.5*   GLOB 5.2* 5.1*     No results for input(s): INR, PTP, APTT, INREXT, INREXT in the last 72 hours. No results for input(s): PHI, PCO2I, PO2I, FIO2I in the last 72 hours. Recent Labs     08/17/21 2355 08/17/21 0019   CPK 6,028* 5,830*       Hemodynamics:   PAP:   CO:     Wedge:   CI:     CVP:    SVR:       PVR:       Ventilator Settings:  Mode Rate Tidal Volume Pressure FiO2 PEEP   Pressure control   270 ml    100 % 14 cm H20     Peak airway pressure: 31 cm H2O    Minute ventilation: 10.4 l/min        MEDS: Reviewed    Chest X-Ray:  CXR Results  (Last 48 hours)               08/17/21 0921  XR CHEST PORT Final result    Impression:  1. No significant change in diffuse bilateral airspace disease and possible   small bilateral pleural effusions. 2.  Lines and tubes as above without evidence of complication. Narrative:  EXAM:  XR CHEST PORT       INDICATION: Respiratory failure, post supine after proning patient. COMPARISON: Chest x-ray 8/15/2021. TECHNIQUE: Frontal and lateral views of the chest       FINDINGS: Diffuse bilateral airspace disease. Possible small bilateral pleural   effusions. No visualized pneumothorax. Endotracheal tube terminates 3 cm from   the tyler. Enteric tube terminates below the level diaphragm. Right IJ catheter   tip projects to lower SVC.                  Multidisciplinary Rounds Completed:  No    ABCDEF Bundle/Checklist Completed:  Yes    SPECIAL EQUIPMENT  None    DISPOSITION  Stay in ICU    CRITICAL CARE CONSULTANT NOTE  I had a face to face encounter with the patient, reviewed and interpreted patient data including clinical events, labs, images, vital signs, I/O's, and examined patient. I have discussed the case and the plan and management of the patient's care with the consulting services, the bedside nurses and the respiratory therapist.      NOTE OF PERSONAL INVOLVEMENT IN CARE   This patient has a high probability of imminent, clinically significant deterioration, which requires the highest level of preparedness to intervene urgently. I participated in the decision-making and personally managed or directed the management of the following life and organ supporting interventions that required my frequent assessment to treat or prevent imminent deterioration. I personally spent 40 minutes of critical care time. This is time spent at this critically ill patient's bedside actively involved in patient care as well as the coordination of care. This does not include any procedural time which has been billed separately.     Terry Dotson MD  Intensivist  8/18/2021

## 2021-08-18 NOTE — DIABETES MGMT
Patient did not have diabetes PTA. A1c @ the time of this admission 6.7% which would indicate a new diagnosis. At this point, despite steroid use, BGs have been easily controlled on corrective insulin. No further action required at this time. Will monitor at a distance.     Suzanna Arrieta DNP, RN, ACNS-BC, BC-ADM, [facility]  Clinical Nurse Specialist-Diabetes & Endocrine disorders    [facility] (In-patient CNS consult service)  (A) [phone]  (PERFECTO) [phone]

## 2021-08-18 NOTE — PROGRESS NOTES
Received patient sedated but off paralytics on versed,fentanyl,propofol,ketamin gtt with plans to wean off propofol completely. Attempted going down on propofol after giving 4mg of ativan and 2hrs later patient awake and coughing. sats down to 70's. NP called to the bedside,precedex added to sedation regimen,titrated propofol and pt quite and comfortable,oxygen sats in the low 90's. 0400  Patient awake coughing uncontrollably with sats in the low 80's,rocuronium given x2 2 hrs apart and effective each time,able to wean off propofol and patient  Comfortable with sats in the low 90's and .Remained proned all shift with no acute changes in status. 2243  Bedside, Verbal and Written shift change report given to [address] [place] [address] (oncoming nurse) by Bogdan Reynoso RN   (offgoing nurse). Report included the following information SBAR, Kardex and MAR.

## 2021-08-18 NOTE — CONSULTS
Palliative Medicine Consult  Keith: [phone] ([phone]    Patient Name: Rosetta Guo  YOB: 1985    Date of Initial Consult: 8/17/2021  Reason for Consult: Care Decisions  Requesting Provider: Francia Mcintosh MD  Primary Care Physician: Mindi Morrison NP     SUMMARY:   Rosetta Guo is a 29 y.o. with a past history of asthma and obesity, who was admitted initially to [address] [place] [address] on 8/12 for worsening COVID symptoms after a positive test in the ED the day before. She rapidly decompensated over the next 24 hours and was then electively intubated in preparation for transfer to [facility] on  8/13/2021. Once here, she was found to have an SPO2 in the 70's, and had to be manually ventilated with an ambu bag on transfer. She was put on a vent with a peep of 15 and 100% FiO2 and admitted to the ICU. This hospitalization she was evaluated for ECMO but her BMI is quite high, and she was ventilating OK, and was tolerating proning  She continues to have some issues with fighting the vent, and remains paralyzed, but is tolerating proning     Many of her immediate family have active COVID symptoms (12 total), but her mom is hospitalized at [facility]. She is aware that her daughter is here, but is hard to understand on the phone. Her sister who would be the next [facility] is also sick, and struggling emotionally. She has also deferred, [facility]. Her grandmother is trying to keep mom in the loop, and has provided her with my phone number to call if she feels she is able to talk. PALLIATIVE DIAGNOSES:   1. Goals of care  2. Shortness of breath  3. COVID-19  4. Anxiety  5. Agitation     PLAN:   1. I reviewed Julianna's chart at length, and spoke with the attending  2. I called her grandmother who is the closest relative who is available and willing to talk.   She was grateful for the call, but shared she feels a little overwhelmed as she is trying to keep all the information for her daughter and granddaughter as well as support her whole family who are all sick. 3. I just gave her a simple update from today, not much had changed from what I could tell, but she is not showing any signs of improvement at this point  4. Colton (grandmother) was appreciative of the information, and seems to be in information gathering mode at this point  5. I will try and touch base periodically so as not to overwhelm her, but offered for her to call me if she needed anything  6. She is going to give my number to patients mom, so that she can call if she feels up to talking on the phone  7. Initial consult note routed to primary continuity provider and/or primary health care team members  8. Communicated plan of care with: Palliative IDTAmaury 192 Team     GOALS OF CARE / TREATMENT PREFERENCES:     GOALS OF CARE:  Patient/Health Care Proxy Stated Goals: Prolong life    TREATMENT PREFERENCES:   Code Status: Full Code    Advance Care Planning:  [x] [facility] Interdisciplinary Team has updated the ACP Navigator with Health Care Decision Maker and Patient Capacity    Primary Decision Maker (Active): Aishwarya Szymanski \"Colton\" - Grandparent - [phone]    Advance Care Planning 8/12/2021   Patient's Healthcare Decision Maker is: Legal Next of Kin   Confirm Advance Directive None   Patient Would Like to Complete Advance Directive No   Does the patient have other document types Other (comment)       Medical Interventions: Full interventions     Other Instructions: Other:    As far as possible, the palliative care team has discussed with patient / health care proxy about goals of care / treatment preferences for patient.      HISTORY:     History obtained from: chart, family    CHIEF COMPLAINT: none, sedated    HPI/SUBJECTIVE:    The patient is:   [] Verbal and participatory  [x] Non-participatory due to:   condition     Clinical Pain Assessment (nonverbal scale for severity on nonverbal patients):   Clinical Pain Assessment  Severity: 0     Activity (Movement): Restless, excessive activity and/or withdrawal reflexes    Duration: for how long has pt been experiencing pain (e.g., 2 days, 1 month, years)  Frequency: how often pain is an issue (e.g., several times per day, once every few days, constant)     FUNCTIONAL ASSESSMENT:     Palliative Performance Scale (PPS):  PPS: 20       PSYCHOSOCIAL/SPIRITUAL SCREENING:     Palliative IDT has assessed this patient for cultural preferences / practices and a referral made as appropriate to needs (Cultural Services, Patient Advocacy, Ethics, etc.)    Any spiritual / Caodaism concerns:  [] Yes /  [x] No    Caregiver Burnout:  [] Yes /  [x] No /  [] No Caregiver Present      Anticipatory grief assessment:   [x] Normal  / [] Maladaptive       ESAS Anxiety: Anxiety: 0    ESAS Depression: Depression: 0        REVIEW OF SYSTEMS:     Positive and pertinent negative findings in ROS are noted above in HPI. The following systems were [x] reviewed / [] unable to be reviewed as noted in HPI  Other findings are noted below. Systems: constitutional, ears/nose/mouth/throat, respiratory, gastrointestinal, genitourinary, musculoskeletal, integumentary, neurologic, psychiatric, endocrine. Positive findings noted below. Modified ESAS Completed by: provider   Fatigue: 10     Depression: 0 Pain: 0   Anxiety: 0 Nausea: 0     Dyspnea: 0           Stool Occurrence(s): 1        PHYSICAL EXAM:     From RN flowsheet:  Wt Readings from Last 3 Encounters:   08/13/21 334 lb 14.1 oz (151.9 kg)   08/12/21 311 lb (141.1 kg)   07/30/21 316 lb (143.3 kg)     Blood pressure (!) 112/58, pulse 73, temperature 100 °F (37.8 °C), resp. rate 28, height 5' 5\" (1.651 m), weight 334 lb 14.1 oz (151.9 kg), SpO2 96 %.     Pain Scale 1: Behavioral Pain Scale (BPS)  Pain Intensity 1: 3              Pain Intervention(s) 1: Medication (see MAR)  Last bowel movement, if known:     Constitutional: proning, appears calm for me  Respiratory: vent       HISTORY:     Active Problems:    Acute hypoxemic respiratory failure due to COVID-19 [facility]) (8/13/2021)      History reviewed. No pertinent past medical history. History reviewed. No pertinent surgical history. History reviewed. No pertinent family history. History reviewed, no pertinent family history. Social History     Tobacco Use    Smoking status: Never Smoker    Smokeless tobacco: Never Used   Substance Use Topics    Alcohol use: Yes     Comment: occasionally     Allergies   Allergen Reactions    Lactose Nausea Only    Pcn [Penicillins] Itching     Patient unsure of penicillin allergy.   Tolerates ceftriaxone      Current Facility-Administered Medications   Medication Dose Route Frequency    rocuronium injection 50 mg  50 mg IntraVENous Q1H PRN    senna-docusate (PERICOLACE) 8.6-50 mg per tablet 1 Tablet  1 Tablet Per G Tube Q12H    furosemide (LASIX) injection 60 mg  60 mg IntraVENous Q8H    balsam peru-castor oiL (VENELEX) ointment   Topical BID    clonazePAM (KlonoPIN) tablet 2 mg  2 mg Per G Tube TID    oxyCODONE (ROXICODONE) 5 mg/5 mL oral solution 30 mg  30 mg Per G Tube Q8H    ketamine (KETALAR) 500 mg in 0.9% sodium chloride 500 mL infusion  0.05-0.2 mg/kg/hr IntraVENous TITRATE    LORazepam (ATIVAN) injection 4 mg  4 mg IntraVENous Q4H PRN    white petrolatum-mineral oiL (LACRILUBE S.O.P.) ointment   Both Eyes Q12H    NOREPINephrine (LEVOPHED) 8 mg in 5% dextrose 250mL (32 mcg/mL) infusion  0.5-16 mcg/min IntraVENous TITRATE    enoxaparin (LOVENOX) injection 150 mg  150 mg SubCUTAneous Q12H    dexmedeTOMidine in 0.9 % NaCl (PRECEDEX) 400 mcg/100 mL (4 mcg/mL) infusion soln  0.1-1.5 mcg/kg/hr IntraVENous TITRATE    midazolam (VERSED) 100 mg in 0.9% sodium chloride 100 mL infusion  0-15 mg/hr IntraVENous TITRATE    cisatracurium (NIMBEX) 200 mg in 0.9% sodium chloride 100 mL (2 mg/mL) infusion  0-10 mcg/kg/min IntraVENous TITRATE    epoprostenol (VELETRI) 30 mcg/mL in 0.9% sodium chloride 50 mL inhalation solution  30 ng/kg/min (Ideal) Inhalation CONTINUOUS    chlorhexidine (ORAL CARE KIT) 0.12 % mouthwash 15 mL  15 mL Oral Q12H    fentaNYL (PF) 1,500 mcg/30 mL (50 mcg/mL) infusion  0-300 mcg/hr IntraVENous TITRATE    sodium chloride (NS) flush 5-40 mL  5-40 mL IntraVENous Q8H    sodium chloride (NS) flush 5-40 mL  5-40 mL IntraVENous PRN    sodium chloride (NS) flush 5-40 mL  5-40 mL IntraVENous Q8H    sodium chloride (NS) flush 5-40 mL  5-40 mL IntraVENous PRN    acetaminophen (TYLENOL) suppository 650 mg  650 mg Rectal Q6H PRN    polyethylene glycol (MIRALAX) packet 17 g  17 g Oral DAILY PRN    ondansetron (ZOFRAN ODT) tablet 4 mg  4 mg Oral Q8H PRN    Or    ondansetron (ZOFRAN) injection 4 mg  4 mg IntraVENous Q6H PRN    acetaminophen (TYLENOL) solution 650 mg  650 mg Oral Q4H PRN    albuterol-ipratropium (DUO-NEB) 2.5 MG-0.5 MG/3 ML  3 mL Nebulization Q4H PRN    pantoprazole (PROTONIX) 40 mg in 0.9% sodium chloride 10 mL injection  40 mg IntraVENous DAILY    dexamethasone (DECADRON) 20 mg in 0.9% sodium chloride 50 mL IVPB  20 mg IntraVENous Q24H    budesonide (PULMICORT) 250 mcg/2ml nebulizer susp  250 mcg Nebulization BID RT    glucose chewable tablet 16 g  4 Tablet Oral PRN    dextrose (D50W) injection syrg 12.5-25 g  25-50 mL IntraVENous PRN    glucagon (GLUCAGEN) injection 1 mg  1 mg IntraMUSCular PRN    insulin lispro (HUMALOG) injection   SubCUTAneous Q6H          LAB AND IMAGING FINDINGS:     Lab Results   Component Value Date/Time    WBC 8.9 08/17/2021 11:55 PM    HGB 13.0 08/17/2021 11:55 PM    PLATELET 540 39/39/7307 11:55 PM     Lab Results   Component Value Date/Time    Sodium 140 08/17/2021 11:55 PM    Potassium 4.5 08/17/2021 11:55 PM    Chloride 103 08/17/2021 11:55 PM    CO2 30 08/17/2021 11:55 PM    BUN 26 (H) 08/17/2021 11:55 PM    Creatinine 0.91 08/17/2021 11:55 PM    Calcium 8.1 (L) 08/17/2021 11:55 PM Magnesium 2.6 (H) 08/17/2021 11:55 PM    Phosphorus 2.4 (L) 08/17/2021 11:55 PM      Lab Results   Component Value Date/Time    Alk. phosphatase 63 08/17/2021 11:55 PM    Protein, total 8.0 08/17/2021 11:55 PM    Albumin 2.8 (L) 08/17/2021 11:55 PM    Globulin 5.2 (H) 08/17/2021 11:55 PM     No results found for: INR, PTMR, PTP, PT1, PT2, APTT, INREXT, INREXT   Lab Results   Component Value Date/Time    Ferritin 1,234 (H) 08/13/2021 05:10 AM      Lab Results   Component Value Date/Time    pH 7.52 (H) 08/18/2021 01:14 AM    PCO2 40 08/18/2021 01:14 AM    PO2 72 (L) 08/18/2021 01:14 AM     No components found for: [place]   Lab Results   Component Value Date/Time    CK 6,028 (H) 08/17/2021 11:55 PM                Total time:   Counseling / coordination time, spent as noted above:   > 50% counseling / coordination?:     Prolonged service was provided for  []30 min   []75 min in face to face time in the presence of the patient, spent as noted above. Time Start:   Time End:   Note: this can only be billed with 63410 (initial) or 04542 (follow up). If multiple start / stop times, list each separately.

## 2021-08-18 NOTE — PROGRESS NOTES
Pharmacy Enoxaparin Monitoring    Dose: 150 mg subQ q12h    Labs:  Recent Labs     08/17/21  2355 08/17/21  0019 08/16/21  0156   CREA 0.91 0.86 0.90   BUN 26* 28* 23*       Creatinine Clearance (mL/min) or Dialysis: 77.6 mL/min (IBW)    Weight: 151.9 kg    Impression/Plan:   Anti-xa level checked due to weight >140 kg  Anti-xa peak level drawn appropriately, resulted at 0.93 - within goal range of 0.6-1  Continue current dose of enoxaparin     Pharmacy will follow daily and adjust medications as appropriate.     Thank you,  CHAUNCEY Quinn

## 2021-08-18 NOTE — PROGRESS NOTES
Patient placed back in supine position. Patient spo2 93% and heart rate 87. Tape removed and tube mathews placed. Patient has no skin break down.

## 2021-08-18 NOTE — PROGRESS NOTES
0720  Bedside shift change report received from Anthony Rodriguez , [facility] (offgoing nurse). Assumed care of pt at this time. Report included the following information SBAR, Kardex, Intake/Output, MAR, Recent Results, Heart rhythm and Medications. 0830  Prone position, easily awakened with suctioning. Large, rust colored sputum via ETT and mouth. Small pool of secretions cleaned from bed. Head and arms repositioned. Pt became agitated, moving arms and legs. Fear of self-extubation, again. Precedex gtt increased as tolerated. Nimbex off since yesterday. Per RN report. Had POC 2 overnight to prevent extubation. 1000  Labs sent :Lipase and Triglycerides    1030  PCA Fent changed out. 1045  OGT removed 750 cc's tan-brown liquid and expelled, TF stopped. 1050  Mouth care rendered. Awakens and coughs violently. Settled down again after left alone. Durbin 800 cc's out    1327  Liza 50mg IV given to supinate pt. Cleaned large liquid BM and gave CHG bath. Linen and gown changed. 1420  LIZA 50 mg IV given, MD at door. Pt is asynchronous with vent. SWR bilateral wrists. 1600  Mouth care rendered. Assessment completed. 1800  Mouth care and Durbin care rendered.     1938  Report to Junior Vanegas

## 2021-08-19 NOTE — PROGRESS NOTES
Spiritual Care Assessment/Progress Note  [facility]      NAME: Lauren Galdamez      MRN: [id number]  AGE: 29 y.o. SEX: female  Advent Affiliation: Shinto   Language: English     8/19/2021     Total Time (in minutes): 5     Spiritual Assessment begun in [facility] 3 through conversation with:         []Patient        [] Family    [] Friend(s)        Reason for Consult: Palliative Care, Follow-up     Spiritual beliefs: (Please include comment if needed)     [] Identifies with a nita tradition:         [] Supported by a nita community:            [] Claims no spiritual orientation:           [] Seeking spiritual identity:                [] Adheres to an individual form of spirituality:           [x] Not able to assess:                           Identified resources for coping:      [] Prayer                               [] Music                  [] Guided Imagery     [] Family/friends                 [] Pet visits     [] Devotional reading                         [x] Unknown     [] Other:                                               Interventions offered during this visit: (See comments for more details)    Patient Interventions: Other (comment)           Plan of Care:     [] Support spiritual and/or cultural needs    [] Support AMD and/or advance care planning process      [] Support grieving process   [] Coordinate Rites and/or Rituals    [] Coordination with community clergy   [] No spiritual needs identified at this time   [] Detailed Plan of Care below (See Comments)  [] Make referral to Music Therapy  [] Make referral to Pet Therapy     [] Make referral to Addiction services  [] Make referral to [facility]  [] Make referral to Spiritual Care Partner  [] No future visits requested        [x] Follow up upon further referrals     Comments:      made a follow-up palliative care visit for Ms. Katerina Llamas in 2514. Patient is COVID positive, non-communicative.  Discussed with bedside RN Tank Hairston, and her mother who is admitted in [address] [place] [address] made the DNR decision as the decision-maker. Contact  if further needs arise.     [id number] [facility] Adriane Jose,Justice, [address] Provider   Paging Service [phone])

## 2021-08-19 NOTE — PROGRESS NOTES
0700  Bedside shift change report received from Leonila Garvey , [facility] (offgoing nurse). Assumed care of pt at this time. Report included the following information SBAR, Kardex, Intake/Output, MAR, Recent Results, Heart rhythm and Medications (Precedex @ 1.2 mcg/kg/hr, Versed @ 15mg/Hr,   Ketamine @ 0.2mh/kg/hr, Fentanyl @ 250 mcg/h). Isolation for Covid-19 maintained. [id number]  Mouth care rendered. Notable dark red-brown sputum via ETT and [place]ounkers. Thick, brick-red secretions from bilateral nares. Pt started spasmatic coughing, biting on ETT. SPO2 dropped to 64% (Vent setting: AC/PC 18+/f28 Pi 12+ and FiO2 100%) -306. With Younkers suction tube, was able to keep mouth open, until she relaxed. Gradually SPO2 reached 88%. During this time HR decreased to 40's and BP 84/44 (58). Levo gtt started for B/P at 4mcg/min . Coffee ground emesis from  cc's. TF stopped yesterday (750 ml's residual). Liquid BM (green-brown) on 08/18/21. Durbin patent, 600cc's cloudy, holly urine, Durbin care rendered. 0815  /57 (68), SB/SR 59-61.    0830  Levo decreased to 2 mcg/min  /68 (80). HR 59     1150  LIZA given to render mouth care and turn. 1800  LIZA given for turning and mouth care. US of ABD done at this time.     1930  Report to [facility]

## 2021-08-19 NOTE — PROGRESS NOTES
[facility]    ICU TEAM Progress Note    Name: Елена Martinez   : 1985   MRN: [id number]   Date: 2021           ICU Assessment & Plan of Care       Елена Martinez Is a 29 y.o. female with asthma who was  admitted for COVID-19 PNA. NEURO  Pain, agitation, sedation  -fent, versed, precedex, ketamine  gtts for deep sedation + PRN nimbex for paralysis  - TGs every Mon/Thurs. CARDIAC  Hypotension due to drugs (iatrogenic)  Bradycardia due to drugs (iatrogenic)  - Levo prn for MAP goal > 65  - monitor HR; ok to 40s    RESPIRATORY  COVID-19 PNA  ARDS, severe, acute worsening  - Prone ventilation - not tolerated due to desaturation with turning  -requiring PRN paralytic  - will have to consider restarting ECMO conversation based on response to proning but not a good candidate due to BMI of 55.  - in the meantime, cont LPV with goal 6cc/kg IBW, plat < 30, DP<15, pH < 7.2, pO2 60-90  - currently ACPC, 285 (~4cc/kg due to plats) / 30 / 14 / 100%  - s/p toci  - cont high-dose steroids  - continue veletri.    - palliative care consulted     RENAL  Rhabdomyolysis  - cont strict I/Os with goal slightly negative daily  - holding diuresis today     GASTROINTESTINAL  At risk for malnutrition  - restart TF.     Concern for pancreatitis secondary to propofol  - rising lipase  - abd ultrasound pending   - start IVF  - currently on TF -  If lipase continues to increase, will need to hold    HEMATOLOGIC  Started on full dose lovenox - now with some bleeding noted in ETT and mouth  - no known clot  - hold lovenox today and monitor h/h  -repeat d dimer  - standard transfusion triggers    ID  As above    ENDOCRINE   Hyperglycemia  - SSI for now, but will likely need to add long-acting given increase in steroids  - currently at goal 140-180    MUSCULOSKELETAL  As above      DVT Prophylaxis: SCD, enoxaparin  U - Ulcer Prophylaxis: PPI  G - Glycemic Control: Insulin  B - Bowel Regimen: miralax  Tubes: ETT and Orogastric Tube  Lines: Peripheral IV, Arterial Line and Central Line  Drains: Durbin Catheter    Subjective:   Progress Note: 8/19/2021      Reason for ICU Admission: COVID-19 PNA     HPI: 29 y. o. female, with significant pmhx of asthma, who presents via EMS [facility] inpatient to the ED with report of decompensation over the last 12 hours due to covid PNA.  Over the course of the past 12 hours pt went from NC to Bipap to intubation for refractory hypoxemia.     Upon arrival to ED [facility] ED pt SPO2 in the 70's; however, as per EMS during transport she had to be manually ventilated with ambu bag and they did not have a peep valve. Initial ABG 7.35/44/58/25. Placed on peep of 15 with 100% FiO2. Will be admitted to the ICU. Hospital course  8/13 - admitted to ICU. ECMO consult called, but declined as currently too stable. Vent optimized and did not meet criteria for proning. 8/14 - HEYDI overnight. 8/15 - had done well during the day with PEEP down to 18, Fi down to 60%. Desat overnight with Fi back up to 100%. Pt increasingly restless (on same sedation), followed by self-extubation. Re-intubated but no reserves with prolonged hypoxia in 40s. Now sedated, paralyzed, and on epo.  8/16 - Patient was changed to supine position overnight and she had worsening hypoxemia and now on 100% Fio2. 8/17 - Patient again changed back to supine potion from prone position but had severe hypoxemic events which later improved with recruitment maneuvers. Still paralyzed with nimbex. 8/18. Weaned off of nimbex. Was in prone position overnight. On 100% Fio2 and PEEP of 18cm of water. Peak pressure ~32, pltp is 29.   8/19 - blood from ett and nose        Home Medications:     Prior to Admission medications    Medication Sig Start Date End Date Taking? Authorizing Provider   lidocaine (LIDODERM) 5 % 1 Patch by TransDERmal route daily as needed (back pain).  Apply patch to the affected area for 12 hours a day and remove for 12 hours a day. Javy, Sasha   ProAir HFA 90 mcg/actuation inhaler Take 2 Puffs by inhalation every four (4) hours as needed for Wheezing, Shortness of Breath or Cough. 7/30/21   Edwige Miles NP   fluticasone propionate (FLOVENT HFA) 110 mcg/actuation inhaler Take 1 Puff by inhalation every twelve (12) hours. 7/30/21   Edwige Miles NP   montelukast (SINGULAIR) 10 mg tablet Take 1 Tablet by mouth daily. 7/30/21   Edwige Miles NP   famotidine (PEPCID) 20 mg tablet Take 1 Tablet by mouth two (2) times a day. 7/30/21   Edwige Miles NP   albuterol (PROVENTIL VENTOLIN) 2.5 mg /3 mL (0.083 %) nebu Take 3 mL by inhalation every four (4) hours as needed for Wheezing, Shortness of Breath or Cough.  6/7/21   Edwige Miles NP       Current Meds:     Current Facility-Administered Medications   Medication Dose Route Frequency    alcohol 62% (NOZIN) nasal  1 Ampule  1 Ampule Topical Q12H    rocuronium injection 50 mg  50 mg IntraVENous Q1H PRN    senna-docusate (PERICOLACE) 8.6-50 mg per tablet 1 Tablet  1 Tablet Per G Tube Q12H    furosemide (LASIX) injection 60 mg  60 mg IntraVENous Q8H    balsam peru-castor oiL (VENELEX) ointment   Topical BID    clonazePAM (KlonoPIN) tablet 2 mg  2 mg Per G Tube TID    oxyCODONE (ROXICODONE) 5 mg/5 mL oral solution 30 mg  30 mg Per G Tube Q8H    ketamine (KETALAR) 500 mg in 0.9% sodium chloride 500 mL infusion  0.05-0.2 mg/kg/hr IntraVENous TITRATE    LORazepam (ATIVAN) injection 4 mg  4 mg IntraVENous Q4H PRN    white petrolatum-mineral oiL (LACRILUBE S.O.P.) ointment   Both Eyes Q12H    NOREPINephrine (LEVOPHED) 8 mg in 5% dextrose 250mL (32 mcg/mL) infusion  0.5-16 mcg/min IntraVENous TITRATE    enoxaparin (LOVENOX) injection 150 mg  150 mg SubCUTAneous Q12H    dexmedeTOMidine in 0.9 % NaCl (PRECEDEX) 400 mcg/100 mL (4 mcg/mL) infusion soln  0.1-1.5 mcg/kg/hr IntraVENous TITRATE    midazolam (VERSED) 100 mg in 0.9% sodium chloride 100 mL infusion 0-15 mg/hr IntraVENous TITRATE    cisatracurium (NIMBEX) 200 mg in 0.9% sodium chloride 100 mL (2 mg/mL) infusion  0-10 mcg/kg/min IntraVENous TITRATE    epoprostenol (VELETRI) 30 mcg/mL in 0.9% sodium chloride 50 mL inhalation solution  30 ng/kg/min (Ideal) Inhalation CONTINUOUS    chlorhexidine (ORAL CARE KIT) 0.12 % mouthwash 15 mL  15 mL Oral Q12H    fentaNYL (PF) 1,500 mcg/30 mL (50 mcg/mL) infusion  0-300 mcg/hr IntraVENous TITRATE    sodium chloride (NS) flush 5-40 mL  5-40 mL IntraVENous Q8H    sodium chloride (NS) flush 5-40 mL  5-40 mL IntraVENous PRN    sodium chloride (NS) flush 5-40 mL  5-40 mL IntraVENous Q8H    sodium chloride (NS) flush 5-40 mL  5-40 mL IntraVENous PRN    acetaminophen (TYLENOL) suppository 650 mg  650 mg Rectal Q6H PRN    polyethylene glycol (MIRALAX) packet 17 g  17 g Oral DAILY PRN    ondansetron (ZOFRAN ODT) tablet 4 mg  4 mg Oral Q8H PRN    Or    ondansetron (ZOFRAN) injection 4 mg  4 mg IntraVENous Q6H PRN    acetaminophen (TYLENOL) solution 650 mg  650 mg Oral Q4H PRN    albuterol-ipratropium (DUO-NEB) 2.5 MG-0.5 MG/3 ML  3 mL Nebulization Q4H PRN    pantoprazole (PROTONIX) 40 mg in 0.9% sodium chloride 10 mL injection  40 mg IntraVENous DAILY    dexamethasone (DECADRON) 20 mg in 0.9% sodium chloride 50 mL IVPB  20 mg IntraVENous Q24H    budesonide (PULMICORT) 250 mcg/2ml nebulizer susp  250 mcg Nebulization BID RT    glucose chewable tablet 16 g  4 Tablet Oral PRN    dextrose (D50W) injection syrg 12.5-25 g  25-50 mL IntraVENous PRN    glucagon (GLUCAGEN) injection 1 mg  1 mg IntraMUSCular PRN    insulin lispro (HUMALOG) injection   SubCUTAneous Q6H       Objective:   Vital Signs:  Visit Vitals  BP (!) 90/48   Pulse 80   Temp 100.1 °F (37.8 °C)   Resp 28   Ht 5' 5\" (1.651 m)   Wt 151.9 kg (334 lb 14.1 oz)   SpO2 92%   BMI 55.73 kg/m²    O2 Flow Rate (L/min): 5 l/min O2 Device: Endotracheal tube Temp (24hrs), Av.8 °F (37.7 °C), Min:98.7 °F (37.1 °C), Max:100.2 °F (37.9 °C)           Intake/Output:     Intake/Output Summary (Last 24 hours) at 8/19/2021 0900  Last data filed at 8/19/2021 0700  Gross per 24 hour   Intake 2875.91 ml   Output 4100 ml   Net -1224.09 ml       Physical Exam:  Supine, intubated, sedated,   ETT in place, RIJ CVC  Resps even and unlabored, symmetric chest rise on MV  Reg rate, no heave/rub  Peripheral pulses intact  Abd soft, obese, nontender  Skin warm, dry    LABS AND  DATA: Personally reviewed  Recent Labs     08/19/21  0355 08/17/21  2355   WBC 7.4 8.9   HGB 13.3 13.0   HCT 43.6 41.0    245     Recent Labs     08/19/21  0355 08/17/21  2355    140   K 4.2 4.5    103   CO2 32 30   BUN 35* 26*   CREA 1.33* 0.91   * 145*   CA 8.8 8.1*   MG 2.6* 2.6*   PHOS 2.6 2.4*     Recent Labs     08/19/21  0355 08/18/21  1001 08/17/21  2355   AP 64  --  63   TP 8.1  --  8.0   ALB 2.9*  --  2.8*   GLOB 5.2*  --  5.2*   LPSE 1,362*   < >  --     < > = values in this interval not displayed. No results for input(s): INR, PTP, APTT, INREXT, INREXT in the last 72 hours. No results for input(s): PHI, PCO2I, PO2I, FIO2I in the last 72 hours. Recent Labs     08/19/21  0355 08/17/21  2355   CPK 2,546* 6,028*       Hemodynamics:   PAP:   CO:     Wedge:   CI:     CVP:    SVR:       PVR:       Ventilator Settings:  Mode Rate Tidal Volume Pressure FiO2 PEEP   Pressure control   270 ml    100 % 18 cm H20     Peak airway pressure: 31 cm H2O    Minute ventilation: 7.77 l/min        MEDS: Reviewed    Chest X-Ray:  CXR Results  (Last 48 hours)               08/17/21 0921  XR CHEST PORT Final result    Impression:  1. No significant change in diffuse bilateral airspace disease and possible   small bilateral pleural effusions. 2.  Lines and tubes as above without evidence of complication. Narrative:  EXAM:  XR CHEST PORT       INDICATION: Respiratory failure, post supine after proning patient.        COMPARISON: Chest x-ray 8/15/2021. TECHNIQUE: Frontal and lateral views of the chest       FINDINGS: Diffuse bilateral airspace disease. Possible small bilateral pleural   effusions. No visualized pneumothorax. Endotracheal tube terminates 3 cm from   the tyler. Enteric tube terminates below the level diaphragm. Right IJ catheter   tip projects to lower SVC. Multidisciplinary Rounds Completed:  No    ABCDEF Bundle/Checklist Completed:  Yes    SPECIAL EQUIPMENT  None    DISPOSITION  Stay in ICU    CRITICAL CARE CONSULTANT NOTE  I had a face to face encounter with the patient, reviewed and interpreted patient data including clinical events, labs, images, vital signs, I/O's, and examined patient. I have discussed the case and the plan and management of the patient's care with the consulting services, the bedside nurses and the respiratory therapist.      NOTE OF PERSONAL INVOLVEMENT IN CARE   This patient has a high probability of imminent, clinically significant deterioration, which requires the highest level of preparedness to intervene urgently. I participated in the decision-making and personally managed or directed the management of the following life and organ supporting interventions that required my frequent assessment to treat or prevent imminent deterioration. I personally spent 45 minutes of critical care time. This is time spent at this critically ill patient's bedside actively involved in patient care as well as the coordination of care. This does not include any procedural time which has been billed separately.     Sharmaine Anderson  [facility]  [phone]    8/19/2021

## 2021-08-19 NOTE — PROGRESS NOTES
Palliative Medicine Consult  Keith: [phone] ([phone])    Patient Name: Giuseppe Pandya  YOB: 1985    Date of Initial Consult: 8/17/2021  Reason for Consult: Care Decisions  Requesting Provider: Dora Barcenas MD  Primary Care Physician: Anup Gross NP     SUMMARY:   Giuseppe Pandya is a 29 y.o. with a past history of asthma and obesity, who was admitted initially to [address] [place] [address] on 8/12 for worsening COVID symptoms after a positive test in the ED the day before. She rapidly decompensated over the next 24 hours and was then electively intubated in preparation for transfer to [facility] on  8/13/2021. Once here, she was found to have an SPO2 in the 70's, and had to be manually ventilated with an ambu bag on transfer. She was put on a vent with a peep of 15 and 100% FiO2 and admitted to the ICU. This hospitalization she was evaluated for ECMO but her BMI is quite high, and she was ventilating OK, and was tolerating proning  She continues to have some issues with fighting the vent, and remains paralyzed, but is tolerating proning     Many of her immediate family have active COVID symptoms (12 total), but her mom is hospitalized at [address] [place] [address]. She is aware that her daughter is here, but is hard to understand on the phone. Her sister who would be the next [facility] is also sick, and struggling emotionally. She has also deferred, so next [facility] is patients grandmother. Her grandmother is trying to keep mom in the loop, and has provided her with my phone number to call if she feels she is able to talk. PALLIATIVE DIAGNOSES:   1. Goals of care  2. Shortness of breath  3. COVID-19  4. Anxiety  5. Agitation     PLAN:   1. Spoke with patients grandmother initially-I shared with her the events over the last 12 hours, and how we are very concerned because she is so precarious. She said that Julianna's mom should be in a position to talk today, but she asked that I make sure that the nurse is in the room with her before I call her.   2. I spoke to patients nurse at [facility] and he agreed to be in the room with her while I talked to her  3. I called Julianna's (mom) cell phone to share with her the status of her daughter. She is very hard to understand because of coughing and shortness of breath, but she was able to ask me a few questions such as \"would a trach help\". She agreed that a DNR was appropriate at this time, but is still hoping her daughter can pull through. 4. I called Colton (grandmother) back to share with her the conversation I had with Ronni Cortez (mom). I let her know that Ronni Cortez (patient) is a DNR now, and that I would call them back later this afternoon to update them  5. Ronni Cortez (mom) called me back a few hours later to ask if the patient was awake- I shared that we were keeping her sedated to allow us to help her breathe and to keep her comfortable. She wants to talk to her daughter, so I let her know we will put the phone in her room up to her ear. 6. Initial consult note routed to primary continuity provider and/or primary health care team members  7. Communicated plan of care with: Palliative IDT, [address] Team     GOALS OF CARE / TREATMENT PREFERENCES:     GOALS OF CARE:  Patient/Health Care Proxy Stated Goals: Prolong life    TREATMENT PREFERENCES:   Code Status: DNR    Advance Care Planning:  [x] [facility] Interdisciplinary Team has updated the ACP Navigator with Health Care Decision Maker and Patient Capacity    Primary Decision Maker: Jennifer Brown - Mother - [phone]    Primary Decision Maker (Active): Aristeo Downey \"Zip\" - Grandparent - [phone]    Advance Care Planning 8/12/2021   Patient's Healthcare Decision Maker is: Legal Next of Kin   Confirm Advance Directive None   Patient Would Like to Complete Advance Directive No   Does the patient have other document types Other (comment)       Medical Interventions:  Other (comment) (full agressive care but no CPR in the event of cardiac death)     Other Instructions: Other:    As far as possible, the palliative care team has discussed with patient / health care proxy about goals of care / treatment preferences for patient. HISTORY:     History obtained from: chart, family    CHIEF COMPLAINT: none, sedated    HPI/SUBJECTIVE:    The patient is:   [] Verbal and participatory  [x] Non-participatory due to:   condition     Clinical Pain Assessment (nonverbal scale for severity on nonverbal patients):   Clinical Pain Assessment  Severity: 0     Activity (Movement): Restless, excessive activity and/or withdrawal reflexes    Duration: for how long has pt been experiencing pain (e.g., 2 days, 1 month, years)  Frequency: how often pain is an issue (e.g., several times per day, once every few days, constant)     FUNCTIONAL ASSESSMENT:     Palliative Performance Scale (PPS):  PPS: 20       PSYCHOSOCIAL/SPIRITUAL SCREENING:     Palliative IDT has assessed this patient for cultural preferences / practices and a referral made as appropriate to needs (Cultural Services, Patient Advocacy, Ethics, etc.)    Any spiritual / Orthodox concerns:  [] Yes /  [x] No    Caregiver Burnout:  [] Yes /  [x] No /  [] No Caregiver Present      Anticipatory grief assessment:   [x] Normal  / [] Maladaptive       ESAS Anxiety: Anxiety: 0    ESAS Depression: Depression: 0        REVIEW OF SYSTEMS:     Positive and pertinent negative findings in ROS are noted above in HPI. The following systems were [x] reviewed / [] unable to be reviewed as noted in HPI  Other findings are noted below. Systems: constitutional, ears/nose/mouth/throat, respiratory, gastrointestinal, genitourinary, musculoskeletal, integumentary, neurologic, psychiatric, endocrine. Positive findings noted below.   Modified ESAS Completed by: provider   Fatigue: 10     Depression: 0 Pain: 0   Anxiety: 0 Nausea: 0     Dyspnea: 0           Stool Occurrence(s): 1        PHYSICAL EXAM:     From RN flowsheet:  Wt Readings from Last 3 Encounters:   08/13/21 334 lb 14.1 oz (151.9 kg)   08/12/21 311 lb (141.1 kg)   07/30/21 316 lb (143.3 kg)     Blood pressure (!) 100/47, pulse 82, temperature 99.8 °F (37.7 °C), resp. rate 27, height 5' 5\" (1.651 m), weight 334 lb 14.1 oz (151.9 kg), SpO2 97 %. Pain Scale 1: Behavioral Pain Scale (BPS)  Pain Intensity 1: 3              Pain Intervention(s) 1: Medication (see MAR)  Last bowel movement, if known:     Constitutional: proning, appears calm for me  Respiratory: vent       HISTORY:     Active Problems:    Acute hypoxemic respiratory failure due to COVID-19 [facility]) (8/13/2021)      Goals of care, counseling/discussion ()      Shortness of breath ()      Anxiety ()      Agitation ()      History reviewed. No pertinent past medical history. History reviewed. No pertinent surgical history. History reviewed. No pertinent family history. History reviewed, no pertinent family history. Social History     Tobacco Use    Smoking status: Never Smoker    Smokeless tobacco: Never Used   Substance Use Topics    Alcohol use: Yes     Comment: occasionally     Allergies   Allergen Reactions    Lactose Nausea Only    Pcn [Penicillins] Itching     Patient unsure of penicillin allergy.   Tolerates ceftriaxone      Current Facility-Administered Medications   Medication Dose Route Frequency    alcohol 62% (NOZIN) nasal  1 Ampule  1 Ampule Topical Q12H    lactated Ringers infusion  75 mL/hr IntraVENous CONTINUOUS    rocuronium injection 50 mg  50 mg IntraVENous Q1H PRN    senna-docusate (PERICOLACE) 8.6-50 mg per tablet 1 Tablet  1 Tablet Per G Tube Q12H    furosemide (LASIX) injection 60 mg  60 mg IntraVENous Q8H    balsam peru-castor oiL (VENELEX) ointment   Topical BID    clonazePAM (KlonoPIN) tablet 2 mg  2 mg Per G Tube TID    oxyCODONE (ROXICODONE) 5 mg/5 mL oral solution 30 mg  30 mg Per G Tube Q8H    ketamine (KETALAR) 500 mg in 0.9% sodium chloride 500 mL infusion  0.05-0.2 mg/kg/hr IntraVENous TITRATE    LORazepam (ATIVAN) injection 4 mg  4 mg IntraVENous Q4H PRN    white petrolatum-mineral oiL (LACRILUBE S.O.P.) ointment   Both Eyes Q12H    NOREPINephrine (LEVOPHED) 8 mg in 5% dextrose 250mL (32 mcg/mL) infusion  0.5-16 mcg/min IntraVENous TITRATE    [Held by provider] enoxaparin (LOVENOX) injection 150 mg  150 mg SubCUTAneous Q12H    dexmedeTOMidine in 0.9 % NaCl (PRECEDEX) 400 mcg/100 mL (4 mcg/mL) infusion soln  0.1-1.5 mcg/kg/hr IntraVENous TITRATE    midazolam (VERSED) 100 mg in 0.9% sodium chloride 100 mL infusion  0-15 mg/hr IntraVENous TITRATE    cisatracurium (NIMBEX) 200 mg in 0.9% sodium chloride 100 mL (2 mg/mL) infusion  0-10 mcg/kg/min IntraVENous TITRATE    epoprostenol (VELETRI) 30 mcg/mL in 0.9% sodium chloride 50 mL inhalation solution  30 ng/kg/min (Ideal) Inhalation CONTINUOUS    chlorhexidine (ORAL CARE KIT) 0.12 % mouthwash 15 mL  15 mL Oral Q12H    fentaNYL (PF) 1,500 mcg/30 mL (50 mcg/mL) infusion  0-300 mcg/hr IntraVENous TITRATE    sodium chloride (NS) flush 5-40 mL  5-40 mL IntraVENous Q8H    sodium chloride (NS) flush 5-40 mL  5-40 mL IntraVENous PRN    sodium chloride (NS) flush 5-40 mL  5-40 mL IntraVENous Q8H    sodium chloride (NS) flush 5-40 mL  5-40 mL IntraVENous PRN    acetaminophen (TYLENOL) suppository 650 mg  650 mg Rectal Q6H PRN    polyethylene glycol (MIRALAX) packet 17 g  17 g Oral DAILY PRN    ondansetron (ZOFRAN ODT) tablet 4 mg  4 mg Oral Q8H PRN    Or    ondansetron (ZOFRAN) injection 4 mg  4 mg IntraVENous Q6H PRN    acetaminophen (TYLENOL) solution 650 mg  650 mg Oral Q4H PRN    albuterol-ipratropium (DUO-NEB) 2.5 MG-0.5 MG/3 ML  3 mL Nebulization Q4H PRN    pantoprazole (PROTONIX) 40 mg in 0.9% sodium chloride 10 mL injection  40 mg IntraVENous DAILY    dexamethasone (DECADRON) 20 mg in 0.9% sodium chloride 50 mL IVPB  20 mg IntraVENous Q24H    budesonide (PULMICORT) 250 mcg/2ml nebulizer susp  250 mcg Nebulization BID RT    glucose chewable tablet 16 g  4 Tablet Oral PRN    dextrose (D50W) injection syrg 12.5-25 g  25-50 mL IntraVENous PRN    glucagon (GLUCAGEN) injection 1 mg  1 mg IntraMUSCular PRN    insulin lispro (HUMALOG) injection   SubCUTAneous Q6H          LAB AND IMAGING FINDINGS:     Lab Results   Component Value Date/Time    WBC 7.4 08/19/2021 03:55 AM    HGB 13.3 08/19/2021 03:55 AM    PLATELET 511 05/77/7071 03:55 AM     Lab Results   Component Value Date/Time    Sodium 143 08/19/2021 03:55 AM    Potassium 4.2 08/19/2021 03:55 AM    Chloride 106 08/19/2021 03:55 AM    CO2 32 08/19/2021 03:55 AM    BUN 35 (H) 08/19/2021 03:55 AM    Creatinine 1.33 (H) 08/19/2021 03:55 AM    Calcium 8.8 08/19/2021 03:55 AM    Magnesium 2.6 (H) 08/19/2021 03:55 AM    Phosphorus 2.6 08/19/2021 03:55 AM      Lab Results   Component Value Date/Time    Alk. phosphatase 64 08/19/2021 03:55 AM    Protein, total 8.1 08/19/2021 03:55 AM    Albumin 2.9 (L) 08/19/2021 03:55 AM    Globulin 5.2 (H) 08/19/2021 03:55 AM     No results found for: INR, PTMR, PTP, PT1, PT2, APTT, INREXT, INREXT   Lab Results   Component Value Date/Time    Ferritin 1,234 (H) 08/13/2021 05:10 AM      Lab Results   Component Value Date/Time    pH 7.47 (H) 08/19/2021 05:12 AM    PCO2 43 08/19/2021 05:12 AM    PO2 66 (L) 08/19/2021 05:12 AM     No components found for: [place]   Lab Results   Component Value Date/Time    CK 2,546 (H) 08/19/2021 03:55 AM                Total time:   Counseling / coordination time, spent as noted above:   > 50% counseling / coordination?:     Prolonged service was provided for  []30 min   []75 min in face to face time in the presence of the patient, spent as noted above. Time Start:   Time End:   Note: this can only be billed with 13042 (initial) or 63483 (follow up). If multiple start / stop times, list each separately.

## 2021-08-19 NOTE — PROGRESS NOTES
0700  Bedside shift change report received from Karl RN (offgoing nurse). Assumed care of pt at this time. Report included the following information SBAR, Kardex, Intake/Output, MAR, Recent Results, Heart rhythm and Medications (Precedex @ 1.2 mcg/kg/hr, Versed @ 15mg/Hr,   Ketamine @ 0.2mh/kg/hr, Fentanyl @ 250 mcg/h). Isolation for Covid-19 maintained. [id number]  Mouth care rendered. Notable dark red-brown sputum via ETT and [place]. Thick, brick-red secretions from bilateral nares. Pt started spasmatic coughing, biting on ETT. SPO2 dropped to 64% (Vent setting: AC/PC 18+/f28 Pi 12+ and FiO2 100%) -306. With [place] suction tube, was able to keep mouth open, until she relaxed. Gradually SPO2 reached 88%. During this time HR decreased to 40's and BP 84/44 (58). Levo gtt started for B/P at 4mcg/min . Coffee ground emesis from  cc's. TF stopped yesterday (750 ml's residual). Liquid BM (green-brown) on 08/18/21. [place] patent, 600cc's cloudy, holly urine, [place] care rendered. 0815  /57 (68), SB/SR 59-61.    0830  Levo decreased to 2 mcg/min  /68 (80).  HR 59

## 2021-08-19 NOTE — INTERDISCIPLINARY ROUNDS
Interdisciplinary team rounds were held 8/19/2021 with the following team members:Care Management, Diabetes Management, Nursing, Nutrition, Pharmacy, Physical Therapy, Physician, Respiratory Therapy and Clinical Coordinator. Plan of care discussed. See clinical pathway and/or care plan for interventions and desired outcomes.

## 2021-08-19 NOTE — PROGRESS NOTES
Comprehensive Nutrition Assessment    Type and Reason for Visit: Initial    Nutrition Recommendations/Plan:   Resume TF as medically able per pancreatitis  Goal: TwoCal @ 40mL/hr + prosource TID + free water flush 100mL Q 4hr  Will provide 2100 kcals, 125g protein, 210g CHO, 1272mL free water  Will meet 100% of estimated needs    Nutrition Assessment:      Chart reviewed and case discussed during CCU rounds. Pt noted for COVID-19, PNA, ARDS, rhabdomyolysis, hx of asthma. Pt is intubated, on pressor support and sedated at this time. She was previously on very high amounts of propofol for sedation, weaned down d/t high triglyceride levels (up to 2166 on 8/16, today 735). She now appears to have acute pancreatitis with elevated lipase 1362, up from 882 yesterday. TF initially started at trickle rate 8/14, got up to 20mL/hr and was stopped yesterday d/t residuals of 750mL. RN reports 100mL of coffee ground emesis OP this morning. TF will remain on hold at this time. Goal rate updated when able to resume TF. Wt Readings from Last 5 Encounters:   08/13/21 151.9 kg (334 lb 14.1 oz)   08/12/21 141.1 kg (311 lb)   07/30/21 143.3 kg (316 lb)   01/29/21 144.2 kg (318 lb)   10/29/20 145.2 kg (320 lb)   ]    Estimated Daily Nutrient Needs:  Energy (kcal): 8285-9283 kcals (12-15 kcals/kg); Weight Used for Energy Requirements: Current  Protein (g): 114-143g protein (2.0-2.5g/kg IBW); Weight Used for Protein Requirements: Ideal  Fluid (ml/day): 2000mL; Method Used for Fluid Requirements: 1 ml/kcal      Nutrition Related Findings:  Labs: lipase 1362, -182. Meds: decadron, lasix, humalog, ketamine, LR, oxycodone, miralax, pericolace. Drips: precedex, fentanyl, versed, levo. BM 8/18.       Wounds:    None       Current Nutrition Therapies:  DIET NPO  ADULT TUBE FEEDING Orogastric; 2.0 Calorie; Delivery Method: Continuous; Continuous Initial Rate (mL/hr): 10; Continuous Advance Tube Feeding: Yes; Advancement Volume (mL/hr): 10; Advancement Frequency: Q 4 hours; Continuous Goal Rate (mL/hr): 40;. .. Anthropometric Measures:  · Height:  5' 5\" (165.1 cm)  · Current Body Wt:  151.9 kg (334 lb 14.1 oz)   · Ideal Body Wt:  125 lbs:  267.9 %   · BMI Category:  Obese class 3 (BMI 40.0 or greater)       Nutrition Diagnosis:   · Inadequate protein-energy intake related to impaired respiratory function as evidenced by NPO or clear liquid status due to medical condition, intubation      Nutrition Interventions:   Food and/or Nutrient Delivery: Continue tube feeding  Nutrition Education and Counseling: No recommendations at this time  Coordination of Nutrition Care: Continue to monitor while inpatient, Interdisciplinary rounds    Goals:  Pt will resume and tolerate TF, advancing to goal next 2-4 days       Nutrition Monitoring and Evaluation:   Behavioral-Environmental Outcomes: None identified  Food/Nutrient Intake Outcomes: Enteral nutrition intake/tolerance  Physical Signs/Symptoms Outcomes: Biochemical data, GI status, Fluid status or edema, Hemodynamic status, Skin, Weight    Discharge Planning:     Too soon to determine     Electronically signed by Todd Multani RD on 8/19/2021 at 1:54 PM    Contact: [phone]

## 2021-08-19 NOTE — PROGRESS NOTES
1935 Verbal shift change report given to Vaishali Flores (oncoming nurse) by Negro Cai RN (offgoing nurse). Report included the following information Kardex, Intake/Output, MAR, Recent Results and Cardiac Rhythm sinus bonnie/sinus rhythm. Dr. Rex Kowalski came by during report. He requested that we place the patient prone position this evening. 2030  Assessment completed. Noted that bed is malfunctioning and not going to maximum inflate. Able to elevate HOB and do Trendelenburg/ Reverse Trendelenburg but cannot manipulate surface. Reviewed with charge nurse and NP. Will defer proning at this time. 0030  No change in assessment. 0400  At 0352, the patient had a bradycardic event and HR went down to 30's. No activity with the patient at time of event, but RN was at bedside. Sats were 95-98% with good pleth and there was not suctioning or repositioning prior to bradycardia. Patient's temp also 102.8 axillary. Given APAP per GT per NP. Patient with some murky thick liquid from her mouth. Color is brown to liver colored. NP notified. Labs drawn.

## 2021-08-19 NOTE — DIABETES MGMT
[address] [place] [address]    CLINICAL NURSE SPECIALIST CONSULT     Initial Presentation   Jesus Tanner is a 29 y.o. female admitted 8/13/21 with dyspnea, nonproductive cough, sneezing, nausea, vomiting and body aches; diagnosed with COVID at [facility]. Fever+. Tachycardic. 02 sats 88%. LAB: Na 130. /AG 10. Creatinine 1.26/GFR 59. AST 82. Troponin elevated. Lactic acid Normal.   CXR: Bilateral patchy infiltrates    HX: History reviewed. No pertinent past medical history. Asthma    INITIAL DX: COVID pneumonia    Current Treatment     TX: Pulm meds. Steroids. Proning. Clot prevention. Diuresis. Insulin. GI protection. Sedation. Palliative care consult    Consulted by Provider for advanced diabetes nursing assessment and care for:   [x] Inpatient management strategy    Hospital Course   Clinical progress has been complicated by need for ICU level care. 8/15/21 Desaturation overnight as patient had self-extubated. Versed infusion added and Nimbex started  CXR:   Bilateral severe airspace disease is stable. There is no pneumothorax. 8/16/21 Agitated. Hypotensive. Bradycardic. May need ECMO. On AC ventilatory support Fi02 100% Peep 16. Supine positioning overnight resulting in worsening hypoxemia  8/17/21 On PC ventilatory support. FiO2 100%. Peep 14. Paralyzed. On Nimbex, Fentanyl, Versed & Propofol infusions as well. Proning with significant issues with hypoxemia. CXR: No significant change in diffuse bilateral airspace disease and possible small bilateral pleural effusions. 8/19/21 On C ventilatory support Fi02 100 Peep 18. Awakens spastic & coughing resulting in desaturation. Receiving levo infusion to treat hypotension. Bloody secretions from nares+ & coffee ground emesis+. TF stopped overnight due to high residuals. Triglycerides elevated => pancreatitis+. Entire family is COVID +. Diabetes History   Can not obtain at this time.     Subjective   Intubated, sedated on Precedex     Objective Physical exam  General Obese AA female. Quite ill   Neuro  Responds to pain  Vital Signs Low grade fever. SB. Hypootensive on levo infusion  Visit Vitals  /60   Pulse (!) 59   Temp 99.8 °F (37.7 °C)   Resp 28   Ht 5' 5\" (1.651 m)   Wt 151.9 kg (334 lb 14.1 oz)   SpO2 95%   BMI 55.73 kg/m²     Laboratory  Tests 8/19 8/17 8/16 8/13/21   A1c    6.7%  (8/14/21)    162 136 142   Anion gap 5 10 7 6   Serum triglycerides 735  1607    WBC 7.4 8.4 6.3 4.4   Serum creatinine 1.33 0.86 0.9 1.33   GFR 55 >60 >60 55   AST 79 137 106 186   ALT 69 87 58 46   Procalcitonin   <0.05 0.32   CK 2546 5830 3683    Ferritin 1362   1234   Lipase         Factors impacting BG management  Factor Dose Comments   Nutrition:  Tube feeding    Two Ronen HN @ 15cc/hr  79 grams/24 hrs   Stopped   Drugs:  Steroids   Dexamethasone 20mg Q24 hrs   Impairs insulin action   Pain Fentanyl infusion BPS 3   Infection: COVID       Blood glucose pattern        Assessment and Plan   Nursing Diagnosis Risk for unstable blood glucose pattern   Nursing Intervention Domain 6052 Decision-making Support   Nursing Interventions Examined current inpatient diabetes control   Explored factors facilitating and impeding inpatient management     Evaluation   This obese AA female without a diagnosis of diabetes PTA. Admission BG of 141 and A1c of 6.7% indicates new onset of Type 2 diabetes, probably exacerbated by need for steroids in the treatment of asthma. During this hospitalization, the patient began steroid therapy on day of admission for COVID infection; corrective insulin approach has been employed. BGs remain in the 100-160s. Would stay with corrective insulin approach.     Recommendations     [x] Continue corrective insulin approach    Billing Code(s)   [x] 88408 IP subsequent hospital care - 25 minutes     Before making these care recommendations, I personally reviewed the hospitalization record, including notes, laboratory & diagnostic data and current medications, and examined the patient at the bedside (circumstances permitting) before making care recommendations.      Total minutes: Campbell 99, CNS  Diabetes Clinical Nurse Specialist  [facility]  Access via [facility]

## 2021-08-20 NOTE — PROGRESS NOTES
08/20/21 0619   ABCDEF Bundle   SBT Safety Screen Passed No   SBT Screen Reason for Failure FiO2 > 50%;PEEP > 7.5

## 2021-08-20 NOTE — PROGRESS NOTES
Palliative Medicine Consult  Keith: [phone] ([phone])    Patient Name: Chetna Zapata  YOB: 1985    Date of Initial Consult: 8/17/2021  Reason for Consult: Care Decisions  Requesting Provider: David Swenson MD  Primary Care Physician: Leydi Goodrich NP     SUMMARY:   Chetna Zapata is a 29 y.o. with a past history of asthma and obesity, who was admitted initially to [facility] on 8/12 for worsening COVID symptoms after a positive test in the ED the day before. She rapidly decompensated over the next 24 hours and was then electively intubated in preparation for transfer to [facility] on  8/13/2021. Once here, she was found to have an SPO2 in the 70's, and had to be manually ventilated with an ambu bag on transfer. She was put on a vent with a peep of 15 and 100% FiO2 and admitted to the ICU. This hospitalization she was evaluated for ECMO but her BMI is quite high, and she was ventilating OK, and was tolerating proning  She continues to have some issues with fighting the vent, and remains paralyzed, but is tolerating proning     Many of her immediate family have active COVID symptoms (12 total), but her mom is hospitalized at [facility]. She is aware that her daughter is here, but is hard to understand on the phone. Her sister who would be the next [facility] is also sick, and struggling emotionally. She has also deferred, so [facility] is patients grandmother. Her grandmother is trying to keep mom in the loop, and has provided her with my phone number to call if she feels she is able to talk. PALLIATIVE DIAGNOSES:   1. Goals of care  2. Shortness of breath  3. COVID-19  4. Anxiety  5. Agitation     PLAN:   1. Spoke with patients mom and grandmother today separately  2. Updated them on her current status and the plan for the day  3. They are still very much hoping for recovery, but do seem to be waiting for the call that she has passed, so were relieved that she was no worse today  4.  They know I will not be here over the weekend, so are prepared to call the nurses station for updates- will call them again on Monday  5. Initial consult note routed to primary continuity provider and/or primary health care team members  6. Communicated plan of care with: Palliative [facility] Team     GOALS OF CARE / TREATMENT PREFERENCES:     GOALS OF CARE:  Patient/Health Care Proxy Stated Goals: Prolong life    TREATMENT PREFERENCES:   Code Status: DNR    Advance Care Planning:  [x] [facility] Interdisciplinary Team has updated the ACP Navigator with Health Care Decision Maker and Patient Capacity    Primary Decision Maker: Jacquenette Rubinstein - Mother - [phone]    Primary Decision Maker (Active): Sage Khan \"Zip\" - Grandparent - [phone]    Advance Care Planning 8/12/2021   Patient's Healthcare Decision Maker is: Legal Next of Kin   Confirm Advance Directive None   Patient Would Like to Complete Advance Directive No   Does the patient have other document types Other (comment)       Medical Interventions: Other (comment) (full agressive care but no CPR in the event of cardiac death)     Other Instructions: Other:    As far as possible, the palliative care team has discussed with patient / health care proxy about goals of care / treatment preferences for patient.      HISTORY:     History obtained from: chart, family    CHIEF COMPLAINT: none, sedated    HPI/SUBJECTIVE:    The patient is:   [] Verbal and participatory  [x] Non-participatory due to:   condition     Clinical Pain Assessment (nonverbal scale for severity on nonverbal patients):   Clinical Pain Assessment  Severity: 0     Activity (Movement): Restless, excessive activity and/or withdrawal reflexes    Duration: for how long has pt been experiencing pain (e.g., 2 days, 1 month, years)  Frequency: how often pain is an issue (e.g., several times per day, once every few days, constant)     FUNCTIONAL ASSESSMENT:     Palliative Performance Scale (PPS):  PPS: 20       PSYCHOSOCIAL/SPIRITUAL SCREENING:     Palliative IDT has assessed this patient for cultural preferences / practices and a referral made as appropriate to needs (Cultural Services, Patient Advocacy, Ethics, etc.)    Any spiritual / Nondenominational concerns:  [] Yes /  [x] No    Caregiver Burnout:  [] Yes /  [x] No /  [] No Caregiver Present      Anticipatory grief assessment:   [x] Normal  / [] Maladaptive       ESAS Anxiety: Anxiety: 0    ESAS Depression: Depression: 0        REVIEW OF SYSTEMS:     Positive and pertinent negative findings in ROS are noted above in HPI. The following systems were [x] reviewed / [] unable to be reviewed as noted in HPI  Other findings are noted below. Systems: constitutional, ears/nose/mouth/throat, respiratory, gastrointestinal, genitourinary, musculoskeletal, integumentary, neurologic, psychiatric, endocrine. Positive findings noted below. Modified ESAS Completed by: provider   Fatigue: 10     Depression: 0 Pain: 0   Anxiety: 0 Nausea: 0     Dyspnea: 0           Stool Occurrence(s): 1        PHYSICAL EXAM:     From RN flowsheet:  Wt Readings from Last 3 Encounters:   08/13/21 334 lb 14.1 oz (151.9 kg)   08/12/21 311 lb (141.1 kg)   07/30/21 316 lb (143.3 kg)     Blood pressure 117/61, pulse 67, temperature 98.9 °F (37.2 °C), resp. rate 28, height 5' 5\" (1.651 m), weight 334 lb 14.1 oz (151.9 kg), SpO2 94 %. Pain Scale 1: Behavioral Pain Scale (BPS)  Pain Intensity 1: 3              Pain Intervention(s) 1: Medication (see MAR)  Last bowel movement, if known:     Constitutional: proning, appears calm for me  Respiratory: vent       HISTORY:     Active Problems:    Acute hypoxemic respiratory failure due to COVID-19 [facility]) (8/13/2021)      Goals of care, counseling/discussion ()      Shortness of breath ()      Anxiety ()      Agitation ()      History reviewed. No pertinent past medical history. History reviewed. No pertinent surgical history. History reviewed.  No pertinent family history. History reviewed, no pertinent family history. Social History     Tobacco Use    Smoking status: Never Smoker    Smokeless tobacco: Never Used   Substance Use Topics    Alcohol use: Yes     Comment: occasionally     Allergies   Allergen Reactions    Lactose Nausea Only    Pcn [Penicillins] Itching     Patient unsure of penicillin allergy.   Tolerates ceftriaxone      Current Facility-Administered Medications   Medication Dose Route Frequency    enoxaparin (LOVENOX) injection 40 mg  40 mg SubCUTAneous Q12H    [START ON 8/21/2021] dexamethasone (DECADRON) 15 mg in 0.9% sodium chloride 50 mL IVPB  15 mg IntraVENous Q24H    alcohol 62% (NOZIN) nasal  1 Ampule  1 Ampule Topical Q12H    lactated Ringers infusion  75 mL/hr IntraVENous CONTINUOUS    white petrolatum-mineral oiL (SOOTHE NIGHT TIME) 80-20 % ophthalmic ointment   Both Eyes Q12H    rocuronium injection 50 mg  50 mg IntraVENous Q1H PRN    senna-docusate (PERICOLACE) 8.6-50 mg per tablet 1 Tablet  1 Tablet Per G Tube Q12H    furosemide (LASIX) injection 60 mg  60 mg IntraVENous Q8H    balsam peru-castor oiL (VENELEX) ointment   Topical BID    clonazePAM (KlonoPIN) tablet 2 mg  2 mg Per G Tube TID    oxyCODONE (ROXICODONE) 5 mg/5 mL oral solution 30 mg  30 mg Per G Tube Q8H    ketamine (KETALAR) 500 mg in 0.9% sodium chloride 500 mL infusion  0.05-0.2 mg/kg/hr IntraVENous TITRATE    LORazepam (ATIVAN) injection 4 mg  4 mg IntraVENous Q4H PRN    NOREPINephrine (LEVOPHED) 8 mg in 5% dextrose 250mL (32 mcg/mL) infusion  0.5-16 mcg/min IntraVENous TITRATE    dexmedeTOMidine in 0.9 % NaCl (PRECEDEX) 400 mcg/100 mL (4 mcg/mL) infusion soln  0.1-1.5 mcg/kg/hr IntraVENous TITRATE    midazolam (VERSED) 100 mg in 0.9% sodium chloride 100 mL infusion  0-15 mg/hr IntraVENous TITRATE    epoprostenol (VELETRI) 30 mcg/mL in 0.9% sodium chloride 50 mL inhalation solution  30 ng/kg/min (Ideal) Inhalation CONTINUOUS    chlorhexidine (ORAL CARE KIT) 0.12 % mouthwash 15 mL  15 mL Oral Q12H    fentaNYL (PF) 1,500 mcg/30 mL (50 mcg/mL) infusion  0-300 mcg/hr IntraVENous TITRATE    sodium chloride (NS) flush 5-40 mL  5-40 mL IntraVENous Q8H    sodium chloride (NS) flush 5-40 mL  5-40 mL IntraVENous PRN    sodium chloride (NS) flush 5-40 mL  5-40 mL IntraVENous Q8H    sodium chloride (NS) flush 5-40 mL  5-40 mL IntraVENous PRN    acetaminophen (TYLENOL) suppository 650 mg  650 mg Rectal Q6H PRN    polyethylene glycol (MIRALAX) packet 17 g  17 g Oral DAILY PRN    ondansetron (ZOFRAN ODT) tablet 4 mg  4 mg Oral Q8H PRN    Or    ondansetron (ZOFRAN) injection 4 mg  4 mg IntraVENous Q6H PRN    acetaminophen (TYLENOL) solution 650 mg  650 mg Oral Q4H PRN    albuterol-ipratropium (DUO-NEB) 2.5 MG-0.5 MG/3 ML  3 mL Nebulization Q4H PRN    pantoprazole (PROTONIX) 40 mg in 0.9% sodium chloride 10 mL injection  40 mg IntraVENous DAILY    budesonide (PULMICORT) 250 mcg/2ml nebulizer susp  250 mcg Nebulization BID RT    glucose chewable tablet 16 g  4 Tablet Oral PRN    dextrose (D50W) injection syrg 12.5-25 g  25-50 mL IntraVENous PRN    glucagon (GLUCAGEN) injection 1 mg  1 mg IntraMUSCular PRN    insulin lispro (HUMALOG) injection   SubCUTAneous Q6H          LAB AND IMAGING FINDINGS:     Lab Results   Component Value Date/Time    WBC 9.5 08/20/2021 03:20 AM    HGB 12.3 08/20/2021 03:20 AM    PLATELET 946 83/48/5897 03:20 AM     Lab Results   Component Value Date/Time    Sodium 145 08/20/2021 03:20 AM    Potassium 4.0 08/20/2021 03:20 AM    Chloride 110 (H) 08/20/2021 03:20 AM    CO2 32 08/20/2021 03:20 AM    BUN 34 (H) 08/20/2021 03:20 AM    Creatinine 1.07 (H) 08/20/2021 03:20 AM    Calcium 8.6 08/20/2021 03:20 AM    Magnesium 2.4 08/20/2021 03:20 AM    Phosphorus 2.4 (L) 08/20/2021 03:20 AM      Lab Results   Component Value Date/Time    Alk.  phosphatase 56 08/20/2021 03:20 AM    Protein, total 7.6 08/20/2021 03:20 AM Albumin 3.3 (L) 08/20/2021 03:20 AM    Globulin 4.3 (H) 08/20/2021 03:20 AM     No results found for: INR, PTMR, PTP, PT1, PT2, APTT, INREXT, INREXT   Lab Results   Component Value Date/Time    Ferritin 1,234 (H) 08/13/2021 05:10 AM      Lab Results   Component Value Date/Time    pH 7.41 08/20/2021 04:43 AM    PCO2 52 (H) 08/20/2021 04:43 AM    PO2 59 (L) 08/20/2021 04:43 AM     No components found for: [place]   Lab Results   Component Value Date/Time    CK 1,750 (H) 08/20/2021 03:20 AM                Total time:   Counseling / coordination time, spent as noted above:   > 50% counseling / coordination?:     Prolonged service was provided for  []30 min   []75 min in face to face time in the presence of the patient, spent as noted above. Time Start:   Time End:   Note: this can only be billed with 16088 (initial) or 15510 (follow up). If multiple start / stop times, list each separately.

## 2021-08-20 NOTE — DIABETES MGMT
Blood glucose pattern has been steady for several days using current approach:    No change in approach indicated.     Lori Marcus DNP, RN, ACNS-BC, BC-ADM, [facility]  Clinical Nurse Specialist-Diabetes & Endocrine disorders    Program for Diabetes Health (In-patient CNS consult service)  (S)  [phone]  ([place]) [phone]

## 2021-08-20 NOTE — PROGRESS NOTES
9494  Bedside shift change report received from [address] (offgoing nurse). Assumed care of pt at this time. Report included the following information SBAR, Kardex, Intake/Output, MAR, Recent Results, Heart rhythm and Medications (Precedex @ 1.2 mcg/kg/hr, Versed @ 15mg/Hr,   Ketamine @ 0.2mh/kg/hr, Fentanyl @ 250 mcg/h, LR @ 75cc/hr). Isolation for Covid-19 maintained. 0745  LIZA 50mg IVP given for mouth care and repositioning. Does NOT tolerate movement or suctioning at any time without use of paralytic agent. 0800  OGT Residual 250cc's returned prior to giving medications. 1100  Rounds with team: Stop TF for now. Prone pt using LIZA as needed to get into position. Plan to leave prone for 16 hours. Restart Lovenox at lower dose. 1330  LIZA 50mg IVP for care rendered and vent tolerance. 1500  LIZA 50mg IVP given for care rendered and to attempt prone position. Unable to prone pt, SPO2 dropping and spasmatic coughing    1730  Prone position achieved. Durbin replaced with #18 Cindy Samaniego 2/2 leaking.   SPO2 95%    1930  Report to [address]

## 2021-08-20 NOTE — PROGRESS NOTES
[facility]    ICU TEAM Progress Note    Name: Kanchan Sneed   : 1985   MRN: [id number]   Date: 2021           ICU Assessment & Plan of Care       Kanchan Sneed Is a 29 y.o. female with asthma who was  admitted for COVID-19 PNA. NEURO  Pain, agitation, sedation  -fent, versed, precedex, ketamine  gtts for deep sedation + PRN nimbex for paralysis  - TGs every Mon/Thurs. CARDIAC  Hypotension due to drugs (iatrogenic)  Bradycardia due to drugs (iatrogenic)  - Levo prn for MAP goal > 65  - monitor HR; ok to 40s    RESPIRATORY  COVID-19 PNA  ARDS, severe, acute worsening  - Prone ventilation -held  due to staffing. Plan to prone today.   -requiring PRN paralytic  - in the meantime, cont LPV with goal 6cc/kg IBW, plat < 30, DP<15, pH < 7.2, pO2 60-90  - currently ACPC, 285 (~4cc/kg due to plats) /  / 100%  - s/p toci  - start weaning high dose steroids. CRP declining.   - continue veletri.    - palliative care consulted     RENAL  Rhabdomyolysis  - cont strict I/Os with goal slightly negative daily  - Continue IVF, CK trending down slightly    GASTROINTESTINAL  At risk for malnutrition  - restart TF  but did not tolerate   Concern for pancreatitis secondary to propofol  - rising lipase  - abd ultrasound pending   - started IVF yesterday   - lipase slightly down. Continue to hold TF    HEMATOLOGIC  Started on full dose lovenox - now with some bleeding noted in ETT and mouth  - no known clot  - lovenox held yesterday.   H/H stable  -D dimer minimally elevated  - restart prophylaxis dose lovenox  - standard transfusion triggers    ID  As above    ENDOCRINE   Hyperglycemia  - SSI for now, but will likely need to add long-acting given increase in steroids  - currently at goal 140-180    MUSCULOSKELETAL  As above      DVT Prophylaxis: SCD, enoxaparin  U - Ulcer Prophylaxis: PPI  G - Glycemic Control: Insulin  B - Bowel Regimen: miralax  Tubes: ETT and Orogastric Tube  Lines: Peripheral IV, Arterial Line and Central Line  Drains: Durbin Catheter    Subjective:   Progress Note: 8/20/2021      Reason for ICU Admission: COVID-19 PNA     HPI: 29 y. o. female, with significant pmhx of asthma, who presents via EMS [facility] inpatient to the ED with report of decompensation over the last 12 hours due to covid PNA.  Over the course of the past 12 hours pt went from NC to Bipap to intubation for refractory hypoxemia.     Upon arrival to ED [facility] ED pt SPO2 in the 70's; however, as per EMS during transport she had to be manually ventilated with ambu bag and they did not have a peep valve. Initial ABG 7.35/44/58/25. Placed on peep of 15 with 100% FiO2. Will be admitted to the ICU. Hospital course  8/13 - admitted to ICU. ECMO consult called, but declined as currently too stable. Vent optimized and did not meet criteria for proning. 8/14 - HEYDI overnight. 8/15 - had done well during the day with PEEP down to 18, Fi down to 60%. Desat overnight with Fi back up to 100%. Pt increasingly restless (on same sedation), followed by self-extubation. Re-intubated but no reserves with prolonged hypoxia in 40s. Now sedated, paralyzed, and on epo.  8/16 - Patient was changed to supine position overnight and she had worsening hypoxemia and now on 100% Fio2. 8/17 - Patient again changed back to supine potion from prone position but had severe hypoxemic events which later improved with recruitment maneuvers. Still paralyzed with nimbex. 8/18. Weaned off of nimbex. Was in prone position overnight. On 100% Fio2 and PEEP of 18cm of water. Peak pressure ~32, pltp is 29.   8/19 - blood from ett and nose        Home Medications:     Prior to Admission medications    Medication Sig Start Date End Date Taking? Authorizing Provider   lidocaine (LIDODERM) 5 % 1 Patch by TransDERmal route daily as needed (back pain). Apply patch to the affected area for 12 hours a day and remove for 12 hours a day. Provider, Sasha   ProAir HFA 90 mcg/actuation inhaler Take 2 Puffs by inhalation every four (4) hours as needed for Wheezing, Shortness of Breath or Cough. 7/30/21   Grant Moulton NP   fluticasone propionate (FLOVENT HFA) 110 mcg/actuation inhaler Take 1 Puff by inhalation every twelve (12) hours. 7/30/21   Grant Moulton NP   montelukast (SINGULAIR) 10 mg tablet Take 1 Tablet by mouth daily. 7/30/21   Grant Moulton NP   famotidine (PEPCID) 20 mg tablet Take 1 Tablet by mouth two (2) times a day. 7/30/21   Grant Moulton NP   albuterol (PROVENTIL VENTOLIN) 2.5 mg /3 mL (0.083 %) nebu Take 3 mL by inhalation every four (4) hours as needed for Wheezing, Shortness of Breath or Cough.  6/7/21   Grant Moulton NP       Current Meds:     Current Facility-Administered Medications   Medication Dose Route Frequency    alcohol 62% (NOZIN) nasal  1 Ampule  1 Ampule Topical Q12H    lactated Ringers infusion  75 mL/hr IntraVENous CONTINUOUS    white petrolatum-mineral oiL (SOOTHE NIGHT TIME) 80-20 % ophthalmic ointment   Both Eyes Q12H    rocuronium injection 50 mg  50 mg IntraVENous Q1H PRN    senna-docusate (PERICOLACE) 8.6-50 mg per tablet 1 Tablet  1 Tablet Per G Tube Q12H    furosemide (LASIX) injection 60 mg  60 mg IntraVENous Q8H    balsam peru-castor oiL (VENELEX) ointment   Topical BID    clonazePAM (KlonoPIN) tablet 2 mg  2 mg Per G Tube TID    oxyCODONE (ROXICODONE) 5 mg/5 mL oral solution 30 mg  30 mg Per G Tube Q8H    ketamine (KETALAR) 500 mg in 0.9% sodium chloride 500 mL infusion  0.05-0.2 mg/kg/hr IntraVENous TITRATE    LORazepam (ATIVAN) injection 4 mg  4 mg IntraVENous Q4H PRN    NOREPINephrine (LEVOPHED) 8 mg in 5% dextrose 250mL (32 mcg/mL) infusion  0.5-16 mcg/min IntraVENous TITRATE    [Held by provider] enoxaparin (LOVENOX) injection 150 mg  150 mg SubCUTAneous Q12H    dexmedeTOMidine in 0.9 % NaCl (PRECEDEX) 400 mcg/100 mL (4 mcg/mL) infusion soln  0.1-1.5 mcg/kg/hr IntraVENous TITRATE    midazolam (VERSED) 100 mg in 0.9% sodium chloride 100 mL infusion  0-15 mg/hr IntraVENous TITRATE    epoprostenol (VELETRI) 30 mcg/mL in 0.9% sodium chloride 50 mL inhalation solution  30 ng/kg/min (Ideal) Inhalation CONTINUOUS    chlorhexidine (ORAL CARE KIT) 0.12 % mouthwash 15 mL  15 mL Oral Q12H    fentaNYL (PF) 1,500 mcg/30 mL (50 mcg/mL) infusion  0-300 mcg/hr IntraVENous TITRATE    sodium chloride (NS) flush 5-40 mL  5-40 mL IntraVENous Q8H    sodium chloride (NS) flush 5-40 mL  5-40 mL IntraVENous PRN    sodium chloride (NS) flush 5-40 mL  5-40 mL IntraVENous Q8H    sodium chloride (NS) flush 5-40 mL  5-40 mL IntraVENous PRN    acetaminophen (TYLENOL) suppository 650 mg  650 mg Rectal Q6H PRN    polyethylene glycol (MIRALAX) packet 17 g  17 g Oral DAILY PRN    ondansetron (ZOFRAN ODT) tablet 4 mg  4 mg Oral Q8H PRN    Or    ondansetron (ZOFRAN) injection 4 mg  4 mg IntraVENous Q6H PRN    acetaminophen (TYLENOL) solution 650 mg  650 mg Oral Q4H PRN    albuterol-ipratropium (DUO-NEB) 2.5 MG-0.5 MG/3 ML  3 mL Nebulization Q4H PRN    pantoprazole (PROTONIX) 40 mg in 0.9% sodium chloride 10 mL injection  40 mg IntraVENous DAILY    dexamethasone (DECADRON) 20 mg in 0.9% sodium chloride 50 mL IVPB  20 mg IntraVENous Q24H    budesonide (PULMICORT) 250 mcg/2ml nebulizer susp  250 mcg Nebulization BID RT    glucose chewable tablet 16 g  4 Tablet Oral PRN    dextrose (D50W) injection syrg 12.5-25 g  25-50 mL IntraVENous PRN    glucagon (GLUCAGEN) injection 1 mg  1 mg IntraMUSCular PRN    insulin lispro (HUMALOG) injection   SubCUTAneous Q6H       Objective:   Vital Signs:  Visit Vitals  BP (!) 104/59   Pulse 74   Temp 98.1 °F (36.7 °C)   Resp 28   Ht 5' 5\" (1.651 m)   Wt 151.9 kg (334 lb 14.1 oz)   SpO2 92%   BMI 55.73 kg/m²    O2 Flow Rate (L/min): 5 l/min O2 Device: Endotracheal tube, Ventilator Temp (24hrs), Av.3 °F (37.9 °C), Min:98.1 °F (36.7 °C), Max:102.4 °F (39.1 °C)           Intake/Output:     Intake/Output Summary (Last 24 hours) at 8/20/2021 1033  Last data filed at 8/20/2021 0900  Gross per 24 hour   Intake 4787.73 ml   Output 5500 ml   Net -712.27 ml       Physical Exam:  Supine, intubated, sedated,   ETT in place, RIJ CVC  Resps even and unlabored, symmetric chest rise on MV  Reg rate, no heave/rub  Peripheral pulses intact  Abd soft, obese, nontender  Skin warm, dry    LABS AND  DATA: Personally reviewed  Recent Labs     08/20/21 0320 08/19/21  0355   WBC 9.5 7.4   HGB 12.3 13.3   HCT 41.6 43.6    264     Recent Labs     08/20/21 0320 08/19/21  0355    143   K 4.0 4.2   * 106   CO2 32 32   BUN 34* 35*   CREA 1.07* 1.33*   * 134*   CA 8.6 8.8   MG 2.4 2.6*   PHOS 2.4* 2.6     Recent Labs     08/20/21 0320 08/19/21  0355 08/19/21  0355   AP 56  --  64   TP 7.6  --  8.1   ALB 3.3*  --  2.9*   GLOB 4.3*  --  5.2*   LPSE 1,002*   < > 1,362*    < > = values in this interval not displayed. No results for input(s): INR, PTP, APTT, INREXT, INREXT in the last 72 hours. No results for input(s): PHI, PCO2I, PO2I, FIO2I in the last 72 hours. Recent Labs     08/20/21 0320 08/19/21  0355   CPK 1,750* 2,546*       Hemodynamics:   PAP:   CO:     Wedge:   CI:     CVP:    SVR:       PVR:       Ventilator Settings:  Mode Rate Tidal Volume Pressure FiO2 PEEP   Pressure control   270 ml    100 % 18 cm H20     Peak airway pressure: 33 cm H2O    Minute ventilation: 6.88 l/min        MEDS: Reviewed    Chest X-Ray:  CXR Results  (Last 48 hours)    None          Multidisciplinary Rounds Completed:  No    ABCDEF Bundle/Checklist Completed:  Yes    SPECIAL EQUIPMENT  None    DISPOSITION  Stay in ICU    CRITICAL CARE CONSULTANT NOTE  I had a face to face encounter with the patient, reviewed and interpreted patient data including clinical events, labs, images, vital signs, I/O's, and examined patient.   I have discussed the case and the plan and management of the patient's care with the consulting services, the bedside nurses and the respiratory therapist.      NOTE OF PERSONAL INVOLVEMENT IN CARE   This patient has a high probability of imminent, clinically significant deterioration, which requires the highest level of preparedness to intervene urgently. I participated in the decision-making and personally managed or directed the management of the following life and organ supporting interventions that required my frequent assessment to treat or prevent imminent deterioration. I personally spent 40 minutes of critical care time. This is time spent at this critically ill patient's bedside actively involved in patient care as well as the coordination of care. This does not include any procedural time which has been billed separately.     Enzo Moreno  [facility]  [phone]    8/20/2021

## 2021-08-21 NOTE — PROGRESS NOTES
[facility]    ICU TEAM Progress Note    Name: Rosa Anderson   : 1985   MRN: [id number]   Date: 2021           ICU Assessment & Plan of Care       Rosa Anderson Is a 29 y.o. female with asthma who was  admitted for COVID-19 PNA. NEURO  Pain, agitation, sedation  -fent, versed, precedex, ketamine  gtts for deep sedation + PRN nimbex for paralysis  - TGs every Mon/Th. CARDIAC  Hypotension due to drugs (iatrogenic)  Bradycardia due to drugs (iatrogenic)  - Levo prn for MAP goal > 65  - monitor HR; ok to 40s    RESPIRATORY  COVID-19 PNA  ARDS, severe, acute worsening  - Prone ventilation -  -requiring PRN paralytic  - in the meantime, cont LPV with goal 6cc/kg IBW, plat < 30, DP<15, pH < 7.2, pO2 60-90  - currently ACPC, 285 (~4cc/kg due to plats) /  / 100%  - s/p toci  - start weaning high dose steroids. CRP declining.   - continue veletri.    - palliative care consulted     RENAL  Rhabdomyolysis  - cont strict I/Os with goal slightly negative daily  - Continue IVF, CK trending down slightly    GASTROINTESTINAL  At risk for malnutrition  - restart TF  but did not tolerate   Concern for pancreatitis secondary to propofol  - rising lipase  - abd ultrasound pending   - started IVF yesterday   - lipase slightly down. Continue to hold TF    HEMATOLOGIC  Started on full dose lovenox - now with some bleeding noted in ETT and mouth  - no known clot  - lovenox held yesterday.   H/H stable  -D dimer minimally elevated  - restart prophylaxis dose lovenox  - standard transfusion triggers    ID  As above    ENDOCRINE   Hyperglycemia  - SSI for now, but will likely need to add long-acting given increase in steroids  - currently at goal 140-180    MUSCULOSKELETAL  As above      DVT Prophylaxis: SCD, enoxaparin  U - Ulcer Prophylaxis: PPI  G - Glycemic Control: Insulin  B - Bowel Regimen: miralax  Tubes: ETT and Orogastric Tube  Lines: Peripheral IV, Arterial Line and Central Line  Drains: Durbin Catheter    Subjective:   Progress Note: 8/21/2021      Reason for ICU Admission: COVID-19 PNA     HPI: 29 y. o. female, with significant pmhx of asthma, who presents via EMS [facility] inpatient to the ED with report of decompensation over the last 12 hours due to covid PNA.  Over the course of the past 12 hours pt went from NC to Bipap to intubation for refractory hypoxemia.     Upon arrival to ED [facility] ED pt SPO2 in the 70's; however, as per EMS during transport she had to be manually ventilated with ambu bag and they did not have a peep valve. Initial ABG 7.35/44/58/25. Placed on peep of 15 with 100% FiO2. Will be admitted to the ICU. Hospital course  8/13 - admitted to ICU. ECMO consult called, but declined as currently too stable. Vent optimized and did not meet criteria for proning. 8/14 - HEYDI overnight. 8/15 - had done well during the day with PEEP down to 18, Fi down to 60%. Desat overnight with Fi back up to 100%. Pt increasingly restless (on same sedation), followed by self-extubation. Re-intubated but no reserves with prolonged hypoxia in 40s. Now sedated, paralyzed, and on epo.  8/16 - Patient was changed to supine position overnight and she had worsening hypoxemia and now on 100% Fio2. 8/17 - Patient again changed back to supine potion from prone position but had severe hypoxemic events which later improved with recruitment maneuvers. Still paralyzed with nimbex. 8/18. Weaned off of nimbex. Was in prone position overnight. On 100% Fio2 and PEEP of 18cm of water. Peak pressure ~32, pltp is 29.   8/19 - blood from ett and nose  8/21: remains on 100% fio2 , peep 18  No improvement in blood gases       Home Medications:     Prior to Admission medications    Medication Sig Start Date End Date Taking? Authorizing Provider   lidocaine (LIDODERM) 5 % 1 Patch by TransDERmal route daily as needed (back pain).  Apply patch to the affected area for 12 hours a day and remove for 12 hours a day. Sasha Palafox   ProAir HFA 90 mcg/actuation inhaler Take 2 Puffs by inhalation every four (4) hours as needed for Wheezing, Shortness of Breath or Cough. 7/30/21   Guera Crain NP   fluticasone propionate (FLOVENT HFA) 110 mcg/actuation inhaler Take 1 Puff by inhalation every twelve (12) hours. 7/30/21   Guera Crain NP   montelukast (SINGULAIR) 10 mg tablet Take 1 Tablet by mouth daily. 7/30/21   Guera Crain NP   famotidine (PEPCID) 20 mg tablet Take 1 Tablet by mouth two (2) times a day. 7/30/21   Guera Crain NP   albuterol (PROVENTIL VENTOLIN) 2.5 mg /3 mL (0.083 %) nebu Take 3 mL by inhalation every four (4) hours as needed for Wheezing, Shortness of Breath or Cough.  6/7/21   Guera Crain NP       Current Meds:     Current Facility-Administered Medications   Medication Dose Route Frequency    enoxaparin (LOVENOX) injection 40 mg  40 mg SubCUTAneous Q12H    dexamethasone (DECADRON) 15 mg in 0.9% sodium chloride 50 mL IVPB  15 mg IntraVENous Q24H    furosemide (LASIX) injection 60 mg  60 mg IntraVENous Q12H    alcohol 62% (NOZIN) nasal  1 Ampule  1 Ampule Topical Q12H    lactated Ringers infusion  75 mL/hr IntraVENous CONTINUOUS    white petrolatum-mineral oiL (SOOTHE NIGHT TIME) 80-20 % ophthalmic ointment   Both Eyes Q12H    rocuronium injection 50 mg  50 mg IntraVENous Q1H PRN    senna-docusate (PERICOLACE) 8.6-50 mg per tablet 1 Tablet  1 Tablet Per G Tube Q12H    balsam peru-castor oiL (VENELEX) ointment   Topical BID    clonazePAM (KlonoPIN) tablet 2 mg  2 mg Per G Tube TID    oxyCODONE (ROXICODONE) 5 mg/5 mL oral solution 30 mg  30 mg Per G Tube Q8H    ketamine (KETALAR) 500 mg in 0.9% sodium chloride 500 mL infusion  0.05-0.2 mg/kg/hr IntraVENous TITRATE    LORazepam (ATIVAN) injection 4 mg  4 mg IntraVENous Q4H PRN    NOREPINephrine (LEVOPHED) 8 mg in 5% dextrose 250mL (32 mcg/mL) infusion  0.5-16 mcg/min IntraVENous TITRATE    dexmedeTOMidine in 0.9 % NaCl (PRECEDEX) 400 mcg/100 mL (4 mcg/mL) infusion soln  0.1-1.5 mcg/kg/hr IntraVENous TITRATE    midazolam (VERSED) 100 mg in 0.9% sodium chloride 100 mL infusion  0-15 mg/hr IntraVENous TITRATE    epoprostenol (VELETRI) 30 mcg/mL in 0.9% sodium chloride 50 mL inhalation solution  30 ng/kg/min (Ideal) Inhalation CONTINUOUS    chlorhexidine (ORAL CARE KIT) 0.12 % mouthwash 15 mL  15 mL Oral Q12H    fentaNYL (PF) 1,500 mcg/30 mL (50 mcg/mL) infusion  0-300 mcg/hr IntraVENous TITRATE    sodium chloride (NS) flush 5-40 mL  5-40 mL IntraVENous Q8H    sodium chloride (NS) flush 5-40 mL  5-40 mL IntraVENous PRN    sodium chloride (NS) flush 5-40 mL  5-40 mL IntraVENous Q8H    sodium chloride (NS) flush 5-40 mL  5-40 mL IntraVENous PRN    acetaminophen (TYLENOL) suppository 650 mg  650 mg Rectal Q6H PRN    polyethylene glycol (MIRALAX) packet 17 g  17 g Oral DAILY PRN    ondansetron (ZOFRAN ODT) tablet 4 mg  4 mg Oral Q8H PRN    Or    ondansetron (ZOFRAN) injection 4 mg  4 mg IntraVENous Q6H PRN    acetaminophen (TYLENOL) solution 650 mg  650 mg Oral Q4H PRN    albuterol-ipratropium (DUO-NEB) 2.5 MG-0.5 MG/3 ML  3 mL Nebulization Q4H PRN    pantoprazole (PROTONIX) 40 mg in 0.9% sodium chloride 10 mL injection  40 mg IntraVENous DAILY    budesonide (PULMICORT) 250 mcg/2ml nebulizer susp  250 mcg Nebulization BID RT    glucose chewable tablet 16 g  4 Tablet Oral PRN    dextrose (D50W) injection syrg 12.5-25 g  25-50 mL IntraVENous PRN    glucagon (GLUCAGEN) injection 1 mg  1 mg IntraMUSCular PRN    insulin lispro (HUMALOG) injection   SubCUTAneous Q6H       Objective:   Vital Signs:  Visit Vitals  BP (!) 121/56 (BP 1 Location: Left upper arm, BP Patient Position: At rest;Prone)   Pulse 79   Temp (!) 102.7 °F (39.3 °C)   Resp 28   Ht 5' 5\" (1.651 m)   Wt 151.9 kg (334 lb 14.1 oz)   SpO2 97%   BMI 55.73 kg/m²    O2 Flow Rate (L/min): 5 l/min O2 Device: Endotracheal tube Temp (24hrs), Av.1 °F (38.4 °C), Min:99.2 °F (37.3 °C), Max:103.2 °F (39.6 °C)           Intake/Output:     Intake/Output Summary (Last 24 hours) at 8/21/2021 1303  Last data filed at 8/21/2021 1200  Gross per 24 hour   Intake 4800.25 ml   Output 3600 ml   Net 1200.25 ml       Physical Exam:  Supine, intubated, sedated,   ETT in place, RIJ CVC  Resps even and unlabored, symmetric chest rise on MV  Reg rate, no heave/rub  Peripheral pulses intact  Abd soft, obese, nontender  Skin warm, dry    LABS AND  DATA: Personally reviewed  Recent Labs     08/21/21  0352 08/20/21  0320   WBC 6.9 9.5   HGB 11.3* 12.3   HCT 39.2 41.6    226     Recent Labs     08/21/21  0352 08/20/21  0320   * 145   K 4.2 4.0   * 110*   CO2 33* 32   BUN 31* 34*   CREA 0.90 1.07*   * 159*   CA 9.0 8.6   MG 2.5* 2.4   PHOS 2.1* 2.4*     Recent Labs     08/21/21  0352 08/20/21  0320   AP 49 56   TP 7.3 7.6   ALB 3.0* 3.3*   GLOB 4.3* 4.3*   LPSE  --  1,002*     No results for input(s): INR, PTP, APTT, INREXT, INREXT in the last 72 hours. No results for input(s): PHI, PCO2I, PO2I, FIO2I in the last 72 hours. Recent Labs     08/21/21  0352 08/20/21  0320   * 1,750*         Ventilator Settings:  Mode Rate Tidal Volume Pressure FiO2 PEEP   Pressure control   270 ml    100 % 18 cm H20     Peak airway pressure: 46 cm H2O    Minute ventilation: 8.83 l/min        MEDS: Reviewed    Chest X-Ray:  CXR Results  (Last 48 hours)    None          Multidisciplinary Rounds Completed:  No    ABCDEF Bundle/Checklist Completed:  Yes    SPECIAL EQUIPMENT  ventilator    DISPOSITION  Stay in ICU    CRITICAL CARE CONSULTANT NOTE  I had a face to face encounter with the patient, reviewed and interpreted patient data including clinical events, labs, images, vital signs, I/O's, and examined patient.   I have discussed the case and the plan and management of the patient's care with the consulting services, the bedside nurses and the respiratory therapist.      NOTE OF PERSONAL INVOLVEMENT IN CARE   This patient has a high probability of imminent, clinically significant deterioration, which requires the highest level of preparedness to intervene urgently. I participated in the decision-making and personally managed or directed the management of the following life and organ supporting interventions that required my frequent assessment to treat or prevent imminent deterioration. I personally spent 40 minutes of critical care time. This is time spent at this critically ill patient's bedside actively involved in patient care as well as the coordination of care. This does not include any procedural time which has been billed separately.     Beba Armenta MD [facility] Critical Care    8/21/2021

## 2021-08-21 NOTE — PROGRESS NOTES
0710 Bedside shift assessment received from [facility]. Report included SBAR, Kardex, I/O, recent labs, patient events, and dual skin assessment. 0800 Shift assessment completed. Pt remains proned and on Precedex @ 1.2, Ketamine @ 0.2, Versed @ 15, Fentanyl @ 250 for sedation control with sats @ 93%. Vent settings %/28/18 with veletri @30. Mouth care provided, plan to turn patient supine around 11am.     1002 Pt alarming with spo2 in the 60s. Pt is proned and heavily sedated but not paralyzed. Coughing and dysychronus with ventilator. Rocuronium 50mg given per prn parameter. Pt spo2 slowly increased to 88-94%. 1200 Reassessment completed. No changes. Plan to place patient supine @ 1330. Mouth care provided. 1330 Confirmed with Intensivist Dr. Perla Mohs that patient was suppose to be placed supine. Intensivist informed RN to keep patient proned until 7AM 8/21. RT informed. 1600 Reassessment completed. Pt tolerating proning well. No changes. Oral care provided. 1900 Bedside shift report given to Segundo Bee RN. Report included SBAR, Kardex, I/O, recent labs, patient events, and dual skin assessment.

## 2021-08-22 NOTE — PROGRESS NOTES
Patient's grandmother and uncle came up to see the patient   Informed them that patient has and is receiving maximal support and will likely die today or tomorrow  They faced timed the patient's mother who is also in [facility] ICU and not doing well   Grandmother is also COVID positive.    D/w her about comfort measures and she said patient's mother is making the decisions about her care for now   Patient remains a DNR and patient's grandmother is aware of the DNR status and is aware that patient will not be coded

## 2021-08-22 NOTE — PROGRESS NOTES
CALLED PATIENT'S GRANDMOTHER AND EXPLAINED TO HER THAT MS.KAREN FELTON WILL LIKELY PASS VERY SOON.  GRANDMOTHER IS ON HER WAY

## 2021-08-22 NOTE — PROGRESS NOTES
[facility]    ICU TEAM Progress Note    Name: Daija Lincoln   : 1985   MRN: [id number]   Date: 2021           ICU Assessment & Plan of Care       Daija Lincoln Is a 29 y.o. female with asthma who was  admitted for COVID-19 PNA. NEURO  Pain, agitation, sedation  -fent, versed, precedex, ketamine  gtts for deep sedation + PRN nimbex for paralysis  - TGs every Mon/Thurs. CARDIAC  Hypotension due to drugs (iatrogenic)  Bradycardia due to drugs (iatrogenic)  - Levo prn for MAP goal > 65  - monitor HR; ok to 40s    RESPIRATORY  COVID-19 PNA  ARDS, severe, acute worsening  - Prone ventilation -  -requiring PRN paralytic  - in the meantime, cont LPV with goal 6cc/kg IBW, plat < 30, DP<15, pH < 7.2, pO2 60-90  - currently ACPC, 285 (~4cc/kg due to plats) /  / 14 / 100%  - s/p toci  - start weaning high dose steroids. CRP declining.   - continue veletri.    - palliative care consulted     RENAL  Rhabdomyolysis  - cont strict I/Os with goal slightly negative daily  - Continue IVF, CK trending down slightly    GASTROINTESTINAL  Tube feedings on hold    HEMATOLOGIC  Started on full dose lovenox   - standard transfusion triggers    ID  Fever : suspect has sinusitis  Purulent foul smelling material from nostril : will add diflucan and broad spectrum abx    ENDOCRINE   Hyperglycemia  - SSI for now, but will likely need to add long-acting given increase in steroids  - currently at goal 140-180      DVT Prophylaxis: SCD, enoxaparin  U - Ulcer Prophylaxis: PPI  G - Glycemic Control: Insulin  B - Bowel Regimen: miralax  Tubes: ETT and Orogastric Tube  Lines: Peripheral IV, Arterial Line and Central Line  Drains: Durbin Catheter   Prognosis terminal  Impending death    Subjective:   Progress Note: 2021      Reason for ICU Admission: COVID-19 PNA     HPI: 29 y. o. female, with significant pmhx of asthma, who presents via EMS [facility] inpatient to the ED with report of decompensation over the last 12 hours due to covid PNA.  Over the course of the past 12 hours pt went from NC to Bipap to intubation for refractory hypoxemia.     Upon arrival to [facility] ED pt SPO2 in the 70's; however, as per EMS during transport she had to be manually ventilated with ambu bag and they did not have a peep valve. Initial ABG 7.35/44/58/25. Placed on peep of 15 with 100% FiO2. Will be admitted to the ICU. Hospital course  8/13 - admitted to ICU. ECMO consult called, but declined as currently too stable. Vent optimized and did not meet criteria for proning. 8/14 - HEYDI overnight. 8/15 - had done well during the day with PEEP down to 18, Fi down to 60%. Desat overnight with Fi back up to 100%. Pt increasingly restless (on same sedation), followed by self-extubation. Re-intubated but no reserves with prolonged hypoxia in 40s. Now sedated, paralyzed, and on epo.  8/16 - Patient was changed to supine position overnight and she had worsening hypoxemia and now on 100% Fio2. 8/17 - Patient again changed back to supine potion from prone position but had severe hypoxemic events which later improved with recruitment maneuvers. Still paralyzed with nimbex. 8/18. Weaned off of nimbex. Was in prone position overnight. On 100% Fio2 and PEEP of 18cm of water. Peak pressure ~32, pltp is 29.   8/19 - blood from ett and nose  8/21: remains on 100% fio2 , peep 18  No improvement in blood gases   8/22: after supining her , her o2 saturations     Home Medications:     Prior to Admission medications    Medication Sig Start Date End Date Taking? Authorizing Provider   lidocaine (LIDODERM) 5 % 1 Patch by TransDERmal route daily as needed (back pain). Apply patch to the affected area for 12 hours a day and remove for 12 hours a day. Provider, Historical   ProAir HFA 90 mcg/actuation inhaler Take 2 Puffs by inhalation every four (4) hours as needed for Wheezing, Shortness of Breath or Cough.  7/30/21   Eben, Celina, TASIA   fluticasone propionate (FLOVENT HFA) 110 mcg/actuation inhaler Take 1 Puff by inhalation every twelve (12) hours. 7/30/21   Maddison Flor NP   montelukast (SINGULAIR) 10 mg tablet Take 1 Tablet by mouth daily. 7/30/21   Maddison Flor NP   famotidine (PEPCID) 20 mg tablet Take 1 Tablet by mouth two (2) times a day. 7/30/21   Maddison Flor NP   albuterol (PROVENTIL VENTOLIN) 2.5 mg /3 mL (0.083 %) nebu Take 3 mL by inhalation every four (4) hours as needed for Wheezing, Shortness of Breath or Cough.  6/7/21   Maddison Flor NP       Current Meds:     Current Facility-Administered Medications   Medication Dose Route Frequency    enoxaparin (LOVENOX) injection 40 mg  40 mg SubCUTAneous Q12H    dexamethasone (DECADRON) 15 mg in 0.9% sodium chloride 50 mL IVPB  15 mg IntraVENous Q24H    furosemide (LASIX) injection 60 mg  60 mg IntraVENous Q12H    alcohol 62% (NOZIN) nasal  1 Ampule  1 Ampule Topical Q12H    lactated Ringers infusion  75 mL/hr IntraVENous CONTINUOUS    white petrolatum-mineral oiL (SOOTHE NIGHT TIME) 80-20 % ophthalmic ointment   Both Eyes Q12H    rocuronium injection 50 mg  50 mg IntraVENous Q1H PRN    senna-docusate (PERICOLACE) 8.6-50 mg per tablet 1 Tablet  1 Tablet Per G Tube Q12H    balsam peru-castor oiL (VENELEX) ointment   Topical BID    clonazePAM (KlonoPIN) tablet 2 mg  2 mg Per G Tube TID    oxyCODONE (ROXICODONE) 5 mg/5 mL oral solution 30 mg  30 mg Per G Tube Q8H    ketamine (KETALAR) 500 mg in 0.9% sodium chloride 500 mL infusion  0.05-0.2 mg/kg/hr IntraVENous TITRATE    LORazepam (ATIVAN) injection 4 mg  4 mg IntraVENous Q4H PRN    NOREPINephrine (LEVOPHED) 8 mg in 5% dextrose 250mL (32 mcg/mL) infusion  0.5-16 mcg/min IntraVENous TITRATE    dexmedeTOMidine in 0.9 % NaCl (PRECEDEX) 400 mcg/100 mL (4 mcg/mL) infusion soln  0.1-1.5 mcg/kg/hr IntraVENous TITRATE    midazolam (VERSED) 100 mg in 0.9% sodium chloride 100 mL infusion  0-15 mg/hr IntraVENous TITRATE    epoprostenol (VELETRI) 30 mcg/mL in 0.9% sodium chloride 50 mL inhalation solution  30 ng/kg/min (Ideal) Inhalation CONTINUOUS    chlorhexidine (ORAL CARE KIT) 0.12 % mouthwash 15 mL  15 mL Oral Q12H    fentaNYL (PF) 1,500 mcg/30 mL (50 mcg/mL) infusion  0-300 mcg/hr IntraVENous TITRATE    sodium chloride (NS) flush 5-40 mL  5-40 mL IntraVENous Q8H    sodium chloride (NS) flush 5-40 mL  5-40 mL IntraVENous PRN    sodium chloride (NS) flush 5-40 mL  5-40 mL IntraVENous Q8H    sodium chloride (NS) flush 5-40 mL  5-40 mL IntraVENous PRN    acetaminophen (TYLENOL) suppository 650 mg  650 mg Rectal Q6H PRN    polyethylene glycol (MIRALAX) packet 17 g  17 g Oral DAILY PRN    ondansetron (ZOFRAN ODT) tablet 4 mg  4 mg Oral Q8H PRN    Or    ondansetron (ZOFRAN) injection 4 mg  4 mg IntraVENous Q6H PRN    acetaminophen (TYLENOL) solution 650 mg  650 mg Oral Q4H PRN    albuterol-ipratropium (DUO-NEB) 2.5 MG-0.5 MG/3 ML  3 mL Nebulization Q4H PRN    pantoprazole (PROTONIX) 40 mg in 0.9% sodium chloride 10 mL injection  40 mg IntraVENous DAILY    budesonide (PULMICORT) 250 mcg/2ml nebulizer susp  250 mcg Nebulization BID RT    glucose chewable tablet 16 g  4 Tablet Oral PRN    dextrose (D50W) injection syrg 12.5-25 g  25-50 mL IntraVENous PRN    glucagon (GLUCAGEN) injection 1 mg  1 mg IntraMUSCular PRN    insulin lispro (HUMALOG) injection   SubCUTAneous Q6H       Objective:   Vital Signs:  Visit Vitals  BP (!) 88/43   Pulse 69   Temp (!) 96.7 °F (35.9 °C)   Resp 26   Ht 5' 5\" (1.651 m)   Wt 151.9 kg (334 lb 14.1 oz)   SpO2 (!) 32%   BMI 55.73 kg/m²    O2 Flow Rate (L/min): 5 l/min O2 Device: Endotracheal tube Temp (24hrs), Av.9 °F (38.8 °C), Min:96.7 °F (35.9 °C), Max:103.3 °F (39.6 °C)           Intake/Output:     Intake/Output Summary (Last 24 hours) at 2021 0908  Last data filed at 2021 0843  Gross per 24 hour   Intake 4437.29 ml   Output 1650 ml   Net 2787.29 ml       Physical Exam:  Supine, intubated, sedated,   ETT in place, RIJ CVC  Resps even and unlabored, symmetric chest rise on MV  Reg rate, no heave/rub  Peripheral pulses intact  Abd soft, obese, nontender  Skin warm, dry    LABS AND  DATA: Personally reviewed  Recent Labs     08/22/21 0405 08/21/21  0352   WBC 6.3 6.9   HGB 11.0* 11.3*   HCT 38.8 39.2   * 177     Recent Labs     08/22/21 0405 08/21/21  0352   * 147*   K 4.0 4.2   * 112*   CO2 31 33*   BUN 29* 31*   CREA 0.90 0.90   * 149*   CA 8.9 9.0   MG 2.6* 2.5*   PHOS 2.9 2.1*     Recent Labs     08/22/21 0405 08/21/21  0352 08/20/21  0320 08/20/21  0320   AP 40* 49   < > 56   TP 6.9 7.3   < > 7.6   ALB 2.9* 3.0*   < > 3.3*   GLOB 4.0 4.3*   < > 4.3*   LPSE  --   --   --  1,002*    < > = values in this interval not displayed. No results for input(s): INR, PTP, APTT, INREXT, INREXT in the last 72 hours. No results for input(s): PHI, PCO2I, PO2I, FIO2I in the last 72 hours. Recent Labs     08/22/21 0405 08/21/21 0352   CPK 1,956* 999*         Ventilator Settings:  Mode Rate Tidal Volume Pressure FiO2 PEEP   Pressure control   270 ml    100 % 18 cm H20     Peak airway pressure: 31 cm H2O    Minute ventilation: 7.34 l/min        MEDS: Reviewed    Chest X-Ray:  CXR Results  (Last 48 hours)    None          Multidisciplinary Rounds Completed:  No    ABCDEF Bundle/Checklist Completed:  Yes    SPECIAL EQUIPMENT  ventilator    DISPOSITION  Stay in ICU    CRITICAL CARE CONSULTANT NOTE  I had a face to face encounter with the patient, reviewed and interpreted patient data including clinical events, labs, images, vital signs, I/O's, and examined patient.   I have discussed the case and the plan and management of the patient's care with the consulting services, the bedside nurses and the respiratory therapist.      NOTE OF PERSONAL INVOLVEMENT IN CARE   This patient has a high probability of imminent, clinically significant deterioration, which requires the highest level of preparedness to intervene urgently. I participated in the decision-making and personally managed or directed the management of the following life and organ supporting interventions that required my frequent assessment to treat or prevent imminent deterioration. I personally spent 40 minutes of critical care time. This is time spent at this critically ill patient's bedside actively involved in patient care as well as the coordination of care. This does not include any procedural time which has been billed separately.     Jaswant Cruz MD [facility] Critical Care    8/22/2021

## 2021-08-22 NOTE — PROGRESS NOTES
08/22/21 0507   ABCDEF Bundle   SBT Safety Screen Passed No   SBT Screen Reason for Failure FiO2 > 50%;PEEP > 7.5

## 2021-08-22 NOTE — PROGRESS NOTES
1900 - 2000  Verbal shift change report given to Medardo Giron (oncoming nurse) by Aftab Horvath (offgoing nurse). Report included the following information SBAR, Kardex, Intake/Output, MAR, Recent Results and Cardiac Rhythm SR.     2000 - 2200  Shift assessment complete, prone position. Fever, decreased SaO2 and strong with mouth care. Multiple drips infusing / precedex titrated up, see MAR for details. 2030 rocuronium prn given, see MAR for details. 2200 - 0000  Ice packs applied for high body temp and prn tylenol given. 0000 - 0200  Reassessment complete, fever / strong cough with decreased SaO2, rocuronium prn given. See MAR for details. 0200 - 0400  @0323 Strong with decreased O2 sat, rocuronium prn given. See MAR for details. 0400 - 0600  Reassessment complete, no change. See flow sheet for more information. 0600 - 0800  Resting quietly. End of Shift Note    Bedside shift change report given to [place] (oncoming nurse) by Ayo Rosa RN (offgoing nurse).   Report included the following information SBAR, Kardex, Intake/Output, MAR, Recent Results and Cardiac Rhythm SR    Shift worked:  Night     Shift summary and any significant changes:     None     Concerns for physician to address:  Low O2 sat with strong cough     [facility]   [phone]       Activity:  Activity Level: Bed Rest  Number times ambulated in hallways past shift: 0  Number of times OOB to chair past shift: 0    Cardiac:   Cardiac Monitoring: Yes      Cardiac Rhythm: Sinus Rhythm    Access:   Current line(s): central line     Genitourinary:   Urinary status: hooks    Respiratory:   O2 Device: Endotracheal tube  Chronic home O2 use?: NO  Incentive spirometer at bedside: NO     GI:  Last Bowel Movement Date: 08/18/21  Current diet:  DIET NPO  Passing flatus: YES  Tolerating current diet: YES       Pain Management:   Patient states pain is manageable on current regimen: YES    Skin:  Ant Score: 8  Interventions: float heels and internal/external urinary devices    Patient Safety:  Fall Score:  Total Score: 2  Interventions: gripper socks  High Fall Risk: Yes    Length of Stay:  Expected LOS: 4d 19h  Actual LOS: [address] [facility] [address], RN

## 2021-08-23 NOTE — PROGRESS NOTES
08/23/21 0635   ABCDEF Bundle   SBT Safety Screen Passed No   SBT Screen Reason for Failure Agitation;FiO2 > 50%;PEEP > 7.5   SBT Trial Passed No   pt on Veletri

## 2021-08-23 NOTE — DIABETES MGMT
Patient BG pattern remains quite stable on corrective insulin approach. Using 6-8 units/D of total insulin:    No further intervention required at this time. Signing off.     Dominga Frank DNP, RN, ACNS-BC, BC-ADM, [facility]  Clinical Nurse Specialist-Diabetes & Endocrine disorders    [facility] Diabetes [facility] (In-patient CNS consult service)  (T) [phone]  (UXB) [phone]

## 2021-08-23 NOTE — PROGRESS NOTES
Care Management:    Transition of Care Plan:     RUR:  23%  Disposition:  TBD,   Follow up appointments:  PCP  DME needed: TBD  Transportation at Discharge:  TBD  Keys or means to access home:  family      IM Medicare Letter: n/a  Is patient a BCPI-A Bundle:                     Caregiver Contact:  Grandmother Ruthy Osullivan at this time. Mom and sister unable at this time to be the [facility]. Discharge Caregiver contacted prior to discharge?          Pt is a 28 yo female transferred from [facility] after decompensating due to COVID PNA. She is now vented and critical in the ICU. She is a DNR. Palliative Care is working with family .     You Lopez RN

## 2021-08-23 NOTE — PROGRESS NOTES
Pharmacy Automatic Renal Dosing Protocol - Antimicrobials    Indication for Antimicrobials: sepsis unknown etiology     Current Regimen of Each Antimicrobial:   Levofloxacin 750 mg IV q24; started ; day 2  Vancomycin - pharmacy to dose; started ; day 1     Previous Antimicrobial Therapy:   N/a    Goal Level: VANCOMYCIN TROUGH GOAL RANGE    Vancomycin Trough: 15 - 20 mcg/mL  (AUC: 400 - 600 mg/hr/Liter/day)     Date Dose & Interval Measured (mcg/mL) Extrapolated (mcg/mL)                       Significant Cultures:    blood cx NG final     Paralysis, amputations, malnutrition: n/a    Labs:  Recent Labs     21  0141 21  0405 21  0352   CREA 0.99 0.90 0.90   BUN 30* 29* 31*   WBC 8.3 6.3 6.9     Temp (24hrs), Av °F (37.8 °C), Min:99.1 °F (37.3 °C), Max:101.2 °F (38.4 °C)    Creatinine Clearance (mL/min) or Dialysis: 71.4 mL/min (IBW)    Impression/Plan:   Scr stable  Vancomycin 2500 mg loading dose then 1500 mg q12h  Anticipated trough 15.5,   Continue current dose of LVQ; appropriate for indication/renal function   Antimicrobial stop date pending     Pharmacy will follow daily and adjust medications as appropriate for renal function and/or serum levels.     Thank you,  CHAUNCEY Eckert

## 2021-08-23 NOTE — PROGRESS NOTES
Palliative Medicine Consult  Keith: [phone] ([phone]    Patient Name: Carmen Flores  YOB: 1985    Date of Initial Consult: 8/17/2021  Reason for Consult: Care Decisions  Requesting Provider: Phil Madrid MD  Primary Care Physician: Mirna Borges NP     SUMMARY:   Tennis Oregon is a 29 y.o. with a past history of asthma and obesity, who was admitted initially to [facility] on 8/12 for worsening COVID symptoms after a positive test in the ED the day before. She rapidly decompensated over the next 24 hours and was then electively intubated in preparation for transfer to [address] [place] [address] on  8/13/2021. Once here, she was found to have an SPO2 in the 70's, and had to be manually ventilated with an ambu bag on transfer. She was put on a vent with a peep of 15 and 100% FiO2 and admitted to the ICU. This hospitalization she was evaluated for ECMO but her BMI is quite high, and she was ventilating OK, and was tolerating proning  She continues to have some issues with fighting the vent, and remains paralyzed, but is tolerating proning     Many of her immediate family have active COVID symptoms (12 total), but her mom is hospitalized at [facility]. She is aware that her daughter is here, but is hard to understand on the phone. Her sister who would be the next [facility] is also sick, and struggling emotionally. She has also deferred, so next Sadia Diop is patients grandmother. Her grandmother is trying to keep mom in the loop, and has provided her with my phone number to call if she feels she is able to talk. PALLIATIVE DIAGNOSES:   1. Goals of care  2. Shortness of breath  3. COVID-19  4. Anxiety  5. Agitation     PLAN:   1. Spoke with patients mom and grandmother today separately  2. Updated them on her current status- and how she is on max support. I shared with them that we are taking it minute by minute, and it still remains true that she may pass at any time  3. Family understands and are all waiting by the phone  4.  Will call them later today to give them an update at the end of the day, unless something changes before then  5. Initial consult note routed to primary continuity provider and/or primary health care team members  6. Communicated plan of care with: Palliative [facility] RE[facility] Team     GOALS OF CARE / TREATMENT PREFERENCES:     GOALS OF CARE:  Patient/Health Care Proxy Stated Goals: Prolong life    TREATMENT PREFERENCES:   Code Status: DNR    Advance Care Planning:  [x] [facility] Interdisciplinary Team has updated the ACP Navigator with Health Care Decision Maker and Patient Capacity    Primary Decision Maker: Cisco Sheth - Mother - [phone]    Primary Decision Maker (Active): Bea Larsone \"Zip\" - Grandparent - [phone]    Advance Care Planning 8/12/2021   Patient's Healthcare Decision Maker is: Legal Next of Kin   Confirm Advance Directive None   Patient Would Like to Complete Advance Directive No   Does the patient have other document types Other (comment)       Medical Interventions: Other (comment) (full agressive care but no CPR in the event of cardiac death)     Other Instructions: Other:    As far as possible, the palliative care team has discussed with patient / health care proxy about goals of care / treatment preferences for patient.      HISTORY:     History obtained from: chart, family    CHIEF COMPLAINT: none, sedated    HPI/SUBJECTIVE:    The patient is:   [] Verbal and participatory  [x] Non-participatory due to:   condition     Clinical Pain Assessment (nonverbal scale for severity on nonverbal patients):   Clinical Pain Assessment  Severity: 0     Activity (Movement): Lying quietly, normal position    Duration: for how long has pt been experiencing pain (e.g., 2 days, 1 month, years)  Frequency: how often pain is an issue (e.g., several times per day, once every few days, constant)     FUNCTIONAL ASSESSMENT:     Palliative Performance Scale (PPS):  PPS: 20       PSYCHOSOCIAL/SPIRITUAL SCREENING:     Palliative IDT has assessed this patient for cultural preferences / practices and a referral made as appropriate to needs (Cultural Services, Patient Advocacy, Ethics, etc.)    Any spiritual / Restorationist concerns:  [] Yes /  [x] No    Caregiver Burnout:  [] Yes /  [x] No /  [] No Caregiver Present      Anticipatory grief assessment:   [x] Normal  / [] Maladaptive       ESAS Anxiety: Anxiety: 0    ESAS Depression: Depression: 0        REVIEW OF SYSTEMS:     Positive and pertinent negative findings in ROS are noted above in HPI. The following systems were [x] reviewed / [] unable to be reviewed as noted in HPI  Other findings are noted below. Systems: constitutional, ears/nose/mouth/throat, respiratory, gastrointestinal, genitourinary, musculoskeletal, integumentary, neurologic, psychiatric, endocrine. Positive findings noted below. Modified ESAS Completed by: provider   Fatigue: 10     Depression: 0 Pain: 0   Anxiety: 0 Nausea: 0     Dyspnea: 0           Stool Occurrence(s): 1        PHYSICAL EXAM:     From RN flowsheet:  Wt Readings from Last 3 Encounters:   08/13/21 334 lb 14.1 oz (151.9 kg)   08/12/21 311 lb (141.1 kg)   07/30/21 316 lb (143.3 kg)     Blood pressure (!) 100/48, pulse 67, temperature (!) 100.7 °F (38.2 °C), resp. rate (!) 34, height 5' 5\" (1.651 m), weight 334 lb 14.1 oz (151.9 kg), SpO2 92 %. Pain Scale 1: Behavioral Pain Scale (BPS)  Pain Intensity 1: 3              Pain Intervention(s) 1: Medication (see MAR)  Last bowel movement, if known:     Constitutional: proning, appears calm for me  Respiratory: vent       HISTORY:     Active Problems:    Acute hypoxemic respiratory failure due to COVID-19 [facility]) (8/13/2021)      Goals of care, counseling/discussion ()      Shortness of breath ()      Anxiety ()      Agitation ()      History reviewed. No pertinent past medical history. History reviewed. No pertinent surgical history. History reviewed. No pertinent family history. History reviewed, no pertinent family history. Social History     Tobacco Use    Smoking status: Never Smoker    Smokeless tobacco: Never Used   Substance Use Topics    Alcohol use: Yes     Comment: occasionally     Allergies   Allergen Reactions    Lactose Nausea Only    Pcn [Penicillins] Itching     Patient unsure of penicillin allergy.   Tolerates ceftriaxone      Current Facility-Administered Medications   Medication Dose Route Frequency    vancomycin (VANCOCIN) 2500 mg in  mL infusion  2,500 mg IntraVENous ONCE    vancomycin (VANCOCIN) 1500 mg in  ml infusion  1,500 mg IntraVENous Q12H    levoFLOXacin (LEVAQUIN) 750 mg in D5W IVPB  750 mg IntraVENous Q24H    fluconazole (DIFLUCAN) 400mg/200 mL IVPB (premix)  400 mg IntraVENous Q24H    enoxaparin (LOVENOX) injection 40 mg  40 mg SubCUTAneous Q12H    dexamethasone (DECADRON) 15 mg in 0.9% sodium chloride 50 mL IVPB  15 mg IntraVENous Q24H    alcohol 62% (NOZIN) nasal  1 Ampule  1 Ampule Topical Q12H    lactated Ringers infusion  75 mL/hr IntraVENous CONTINUOUS    white petrolatum-mineral oiL (SOOTHE NIGHT TIME) 80-20 % ophthalmic ointment   Both Eyes Q12H    rocuronium injection 50 mg  50 mg IntraVENous Q1H PRN    senna-docusate (PERICOLACE) 8.6-50 mg per tablet 1 Tablet  1 Tablet Per G Tube Q12H    balsam peru-castor oiL (VENELEX) ointment   Topical BID    clonazePAM (KlonoPIN) tablet 2 mg  2 mg Per G Tube TID    oxyCODONE (ROXICODONE) 5 mg/5 mL oral solution 30 mg  30 mg Per G Tube Q8H    ketamine (KETALAR) 500 mg in 0.9% sodium chloride 500 mL infusion  0.05-0.2 mg/kg/hr IntraVENous TITRATE    LORazepam (ATIVAN) injection 4 mg  4 mg IntraVENous Q4H PRN    NOREPINephrine (LEVOPHED) 8 mg in 5% dextrose 250mL (32 mcg/mL) infusion  0.5-16 mcg/min IntraVENous TITRATE    dexmedeTOMidine in 0.9 % NaCl (PRECEDEX) 400 mcg/100 mL (4 mcg/mL) infusion soln  0.1-1.5 mcg/kg/hr IntraVENous TITRATE    midazolam (VERSED) 100 mg in 0.9% sodium chloride 100 mL infusion  0-15 mg/hr IntraVENous TITRATE    epoprostenol (VELETRI) 30 mcg/mL in 0.9% sodium chloride 50 mL inhalation solution  30 ng/kg/min (Ideal) Inhalation CONTINUOUS    chlorhexidine (ORAL CARE KIT) 0.12 % mouthwash 15 mL  15 mL Oral Q12H    fentaNYL (PF) 1,500 mcg/30 mL (50 mcg/mL) infusion  0-300 mcg/hr IntraVENous TITRATE    sodium chloride (NS) flush 5-40 mL  5-40 mL IntraVENous Q8H    sodium chloride (NS) flush 5-40 mL  5-40 mL IntraVENous PRN    sodium chloride (NS) flush 5-40 mL  5-40 mL IntraVENous Q8H    sodium chloride (NS) flush 5-40 mL  5-40 mL IntraVENous PRN    acetaminophen (TYLENOL) suppository 650 mg  650 mg Rectal Q6H PRN    polyethylene glycol (MIRALAX) packet 17 g  17 g Oral DAILY PRN    ondansetron (ZOFRAN ODT) tablet 4 mg  4 mg Oral Q8H PRN    Or    ondansetron (ZOFRAN) injection 4 mg  4 mg IntraVENous Q6H PRN    acetaminophen (TYLENOL) solution 650 mg  650 mg Oral Q4H PRN    albuterol-ipratropium (DUO-NEB) 2.5 MG-0.5 MG/3 ML  3 mL Nebulization Q4H PRN    pantoprazole (PROTONIX) 40 mg in 0.9% sodium chloride 10 mL injection  40 mg IntraVENous DAILY    budesonide (PULMICORT) 250 mcg/2ml nebulizer susp  250 mcg Nebulization BID RT    glucose chewable tablet 16 g  4 Tablet Oral PRN    dextrose (D50W) injection syrg 12.5-25 g  25-50 mL IntraVENous PRN    glucagon (GLUCAGEN) injection 1 mg  1 mg IntraMUSCular PRN    insulin lispro (HUMALOG) injection   SubCUTAneous Q6H          LAB AND IMAGING FINDINGS:     Lab Results   Component Value Date/Time    WBC 8.3 08/23/2021 01:41 AM    HGB 11.4 (L) 08/23/2021 01:41 AM    PLATELET 652 (L) 10/04/5751 01:41 AM     Lab Results   Component Value Date/Time    Sodium 146 (H) 08/23/2021 01:41 AM    Potassium 4.5 08/23/2021 01:41 AM    Chloride 112 (H) 08/23/2021 01:41 AM    CO2 33 (H) 08/23/2021 01:41 AM    BUN 30 (H) 08/23/2021 01:41 AM    Creatinine 0.99 08/23/2021 01:41 AM    Calcium 8.7 08/23/2021 01:41 AM Magnesium 2.2 08/23/2021 01:41 AM    Phosphorus 3.8 08/23/2021 01:41 AM      Lab Results   Component Value Date/Time    Alk. phosphatase 49 08/23/2021 01:41 AM    Protein, total 7.1 08/23/2021 01:41 AM    Albumin 2.7 (L) 08/23/2021 01:41 AM    Globulin 4.4 (H) 08/23/2021 01:41 AM     No results found for: INR, PTMR, PTP, PT1, PT2, APTT, INREXT, INREXT   Lab Results   Component Value Date/Time    Ferritin 1,234 (H) 08/13/2021 05:10 AM      Lab Results   Component Value Date/Time    pH 7.30 (L) 08/22/2021 08:36 AM    PCO2 69 (H) 08/22/2021 08:36 AM    PO2 31 (LL) 08/22/2021 08:36 AM     No components found for: [place]   Lab Results   Component Value Date/Time    CK 4,173 (H) 08/23/2021 01:41 AM                Total time:   Counseling / coordination time, spent as noted above:   > 50% counseling / coordination?:     Prolonged service was provided for  []30 min   []75 min in face to face time in the presence of the patient, spent as noted above. Time Start:   Time End:   Note: this can only be billed with 12863 (initial) or 30509 (follow up). If multiple start / stop times, list each separately.

## 2021-08-23 NOTE — PROGRESS NOTES
Comprehensive Nutrition Assessment    Type and Reason for Visit: Reassess    Nutrition Recommendations/Plan:   Resume TF if pt not transitioned to [address] [place] [address]    Nutrition Assessment:      Chart reviewed. Pt remains intubated and sedated. TFs are not running, OGT to continuous suction. New stage 2 PI noted to pt's neck. Per notes, pt is close to passing at any time. Will continue monitoring. Estimated Daily Nutrient Needs:  Energy (kcal): 4540-7288 kcals (12-15 kcals/kg); Weight Used for Energy Requirements: Current  Protein (g): 114-143g protein (2.0-2.5g/kg IBW); Weight Used for Protein Requirements: Ideal  Fluid (ml/day): 2000mL; Method Used for Fluid Requirements: 1 ml/kcal      Nutrition Related Findings:  Labs reviewed. Meds: decadron, diflucan, humalog, LR, levaquin, oxycodone, pericolace, vancomycin. Drips: precedex, fentanyl, versed. BM 8/18.       Wounds:    Pressure injury, Stage II       Current Nutrition Therapies:  DIET NPO    Anthropometric Measures:  · Height:  5' 5\" (165.1 cm)  · Current Body Wt:  151.9 kg (334 lb 14.1 oz)   · Ideal Body Wt:  125 lbs:  267.9 %   · BMI Category:  Obese class 3 (BMI 40.0 or greater)       Nutrition Diagnosis:   · Inadequate protein-energy intake related to impaired respiratory function as evidenced by NPO or clear liquid status due to medical condition, intubation      Nutrition Interventions:   Food and/or Nutrient Delivery: Continue tube feeding  Nutrition Education and Counseling: No recommendations at this time  Coordination of Nutrition Care: Continue to monitor while inpatient, Interdisciplinary rounds    Goals:  Pt will resume and tolerate TF or transition to CMO next 2-4 days       Nutrition Monitoring and Evaluation:   Behavioral-Environmental Outcomes: None identified  Food/Nutrient Intake Outcomes: Enteral nutrition intake/tolerance  Physical Signs/Symptoms Outcomes: Biochemical data, GI status, Fluid status or edema, Hemodynamic status, Skin, Weight    Discharge Planning:     Too soon to determine     Electronically signed by Samara Polanco RD on 8/23/2021 at 11:41 AM    Contact: [phone]

## 2021-08-23 NOTE — PROGRESS NOTES
1900 - 2000  Verbal shift change report given to Ivelisse Monae (oncoming nurse) by Carmen Price (offgoing nurse). Report included the following information SBAR, Kardex, Intake/Output, MAR, Recent Results and Cardiac Rhythm SR / SB.     2000 - 2200  Shift assessment complete, not interactive, VSS will continue to monitor. Versed infusion completed, agitation / strong cough with decreased of SaO2, request for refill sent to pharmacy. Precedex / fentanyl titrated up to max for comfort, rocuronium also given. See MAR for details. 2200 - 0000  Decreased O2 Sat with stimulation. 0000 - 0200  Reassessment complete, no change. @ 0100 Strong cough with agitation, copious purulent sputum noted. Meds given ( rocuronium, ativan ), see MAR for details. 0200 - 0400  Strong cough with O2 desaturation, meds given. See MAR for details. 0400 - 0600  Reassessment complete. Agitation / strong productive cough. PRN ativan and rocuronium given, see MAR for details. Bath complete with multiple staff / co workers help  turning patient and wound was found on neck posterior. 0600 - 0800  Resting quietly, will continue to monitor. End of Shift Note    Bedside shift change report given to Thea (oncoming nurse) by Rach Reeves RN (offgoing nurse).   Report included the following information SBAR, Kardex, Intake/Output, MAR, Recent Results and Cardiac Rhythm SR / SB    Shift worked:  Night     Shift summary and any significant changes:     None     Concerns for physician to address:  Palliative     Zone phone for oncoming shift:   [phone]       Activity:  Activity Level: Bed Rest  Number times ambulated in hallways past shift: 0  Number of times OOB to chair past shift: 0    Cardiac:   Cardiac Monitoring: Yes      Cardiac Rhythm: Sinus Rhythm    Access:   Current line(s): central line     Genitourinary:   Urinary status: hooks    Respiratory:   O2 Device: Ventilator  Chronic home O2 use?: NO  Incentive spirometer at bedside: NO GI:  Last Bowel Movement Date: 08/18/21  Current diet:  DIET NPO  Passing flatus: YES  Tolerating current diet: YES       Pain Management:   Patient states pain is manageable on current regimen: N/A    Skin:  Ant Score: 8  Interventions: float heels and internal/external urinary devices    Patient Safety:  Fall Score:  Total Score: 2  Interventions: gripper socks  High Fall Risk: Yes    Length of Stay:  Expected LOS: 4d 19h  Actual LOS: [address] RN

## 2021-08-23 NOTE — PROGRESS NOTES
PICC order acknowledged. Spoke with patient's mother Balbir Zaidi to obtain consent for PICC insertion. Spoke with patient's primary nurse, Guadalupe Olivier, BELEN and explained that our [address] equipment is currently not working. We are supposed to get an equipment exchange tomorrow. Explained to primary nurse that we do have [employer] equipment for PICC placement, however only have single lumen PICCs at this time which would not be enough access given the patient's current continuous medications. Primary RN agreed to wait until tomorrow for PICC placement as patient currently has working Quad lumen CVC.      Raine Hardin RN, BSN, [facility]-BC  Vascular Access Team

## 2021-08-23 NOTE — PROGRESS NOTES
0730-Bedside and Verbal shift change report received from Yoseph Torres RN (offgoing nurse) to John Courtney RN (oncoming nurse). Report included the following information SBAR, Kardex, MAR and Recent Results. 0800-Shift assessment complete-see flowsheet for findings. Pt. remains intubated and sedated on Versed, Precedex, Fentanyl and Ketamine. Unresponsive, does not withdrawal to pain stimulus, but does cough/gag with suction. Copious amount of thick, purulent brownish, malodorous drainage noted from ETT, mouth and R nare-Dr. Malloy aware. 1200-Reassessment complete-no changes noted. 1300-Pt's sister in at the bedside to visit at this time. 1400-Pt's grandmother and brother in at the bedside to visit at this time. 1600-Reassessment complete-no changes noted. 1750 -Blood cultures obtained from L medial FA x1 set and L dorsal hand x1 set-walked to the lab.    1900-Bedside and Verbal shift change report given to [place], RN (oncoming nurse) by John Courtney RN (offgoing nurse). Report included the following information SBAR, Kardex, MAR and Recent Results.

## 2021-08-23 NOTE — PROGRESS NOTES
[facility]    ICU TEAM Progress Note    Name: Yadiel Gasca   : 1985   MRN: [id number]   Date: 2021           ICU Assessment & Plan of Care       Yadiel Gasca Is a 29 y.o. female with asthma who was  admitted for COVID-19 PNA. NEURO  Pain, agitation, sedation  -fent, versed, precedex, ketamine  gtts for deep sedation  D/c paralytic : cpk is increasing    CARDIAC  Suspect septic shock at this time   Start pressors  D/c lasix    RESPIRATORY  COVID-19 PNA   cont LPV with goal 6cc/kg IBW, plat < 30, DP<15, pH < 7.2, pO2 60-90  - currently ACPC, 285 (~4cc/kg due to plats) /  / 14 / 100%  - s/p toci  - taper off and stop dexamethasone  No more proning    RENAL  Rhabdomyolysis  - cpk elevated today  D/c paralytic    GASTROINTESTINAL  Resume tube feedings    HEMATOLOGIC  Started on full dose lovenox   - standard transfusion triggers    ID  Fever : suspect has sinusitis  Purulent foul smelling material from nostril : will add diflucan and broad spectrum abx  Add vancomycin    ENDOCRINE   Hyperglycemia  - SSI for now, but will likely need to add long-acting given increase in steroids  - currently at goal 140-180      DVT Prophylaxis: SCD, enoxaparin  U - Ulcer Prophylaxis: PPI  G - Glycemic Control: Insulin  B - Bowel Regimen: miralax  Tubes: ETT and Orogastric Tube  Lines: Peripheral IV, Arterial Line and Central Line   PICC line ordered so that central line can be d/c'd  Drains: Durbin Catheter   Prognosis terminal  Impending death    Prognosis poor    Subjective:   Progress Note: 2021      Reason for ICU Admission: COVID-19 PNA     HPI: 29 y. o. female, with significant pmhx of asthma, who presents via EMS [facility] inpatient to the ED with report of decompensation over the last 12 hours due to covid PNA.  Over the course of the past 12 hours pt went from NC to Bipap to intubation for refractory hypoxemia.     Upon arrival to ED [facility] ED pt SPO2 in the 70's; however, as per EMS during transport she had to be manually ventilated with ambu bag and they did not have a peep valve. Initial ABG 7.35/44/58/25. Placed on peep of 15 with 100% FiO2. Will be admitted to the ICU. Hospital course  8/13 - admitted to ICU. ECMO consult called, but declined as currently too stable. Vent optimized and did not meet criteria for proning. 8/14 - HEYDI overnight. 8/15 - had done well during the day with PEEP down to 18, Fi down to 60%. Desat overnight with Fi back up to 100%. Pt increasingly restless (on same sedation), followed by self-extubation. Re-intubated but no reserves with prolonged hypoxia in 40s. Now sedated, paralyzed, and on epo.  8/16 - Patient was changed to supine position overnight and she had worsening hypoxemia and now on 100% Fio2. 8/17 - Patient again changed back to supine potion from prone position but had severe hypoxemic events which later improved with recruitment maneuvers. Still paralyzed with nimbex. 8/18. Weaned off of nimbex. Was in prone position overnight. On 100% Fio2 and PEEP of 18cm of water. Peak pressure ~32, pltp is 29.   8/19 - blood from ett and nose  8/21: remains on 100% fio2 , peep 18  No improvement in blood gases   8/22: after supining her , her o2 saturations were in the 30's for more than 1 hour  8/23: spiking high grade temps, slightly hypotensive    Home Medications:     Prior to Admission medications    Medication Sig Start Date End Date Taking? Authorizing Provider   lidocaine (LIDODERM) 5 % 1 Patch by TransDERmal route daily as needed (back pain). Apply patch to the affected area for 12 hours a day and remove for 12 hours a day. Provider, Sasha   ProAir HFA 90 mcg/actuation inhaler Take 2 Puffs by inhalation every four (4) hours as needed for Wheezing, Shortness of Breath or Cough. 7/30/21   Beto Coleman NP   fluticasone propionate (FLOVENT HFA) 110 mcg/actuation inhaler Take 1 Puff by inhalation every twelve (12) hours.  7/30/21 Emmett Perez NP   montelukast (SINGULAIR) 10 mg tablet Take 1 Tablet by mouth daily. 7/30/21   Emmett Perez NP   famotidine (PEPCID) 20 mg tablet Take 1 Tablet by mouth two (2) times a day. 7/30/21   Emmett Perez NP   albuterol (PROVENTIL VENTOLIN) 2.5 mg /3 mL (0.083 %) nebu Take 3 mL by inhalation every four (4) hours as needed for Wheezing, Shortness of Breath or Cough.  6/7/21   Emmett Perez NP       Current Meds:     Current Facility-Administered Medications   Medication Dose Route Frequency    vancomycin (VANCOCIN) 2500 mg in  mL infusion  2,500 mg IntraVENous ONCE    vancomycin (VANCOCIN) 1500 mg in  ml infusion  1,500 mg IntraVENous Q12H    levoFLOXacin (LEVAQUIN) 750 mg in D5W IVPB  750 mg IntraVENous Q24H    fluconazole (DIFLUCAN) 400mg/200 mL IVPB (premix)  400 mg IntraVENous Q24H    enoxaparin (LOVENOX) injection 40 mg  40 mg SubCUTAneous Q12H    dexamethasone (DECADRON) 15 mg in 0.9% sodium chloride 50 mL IVPB  15 mg IntraVENous Q24H    alcohol 62% (NOZIN) nasal  1 Ampule  1 Ampule Topical Q12H    lactated Ringers infusion  75 mL/hr IntraVENous CONTINUOUS    white petrolatum-mineral oiL (SOOTHE NIGHT TIME) 80-20 % ophthalmic ointment   Both Eyes Q12H    rocuronium injection 50 mg  50 mg IntraVENous Q1H PRN    senna-docusate (PERICOLACE) 8.6-50 mg per tablet 1 Tablet  1 Tablet Per G Tube Q12H    balsam peru-castor oiL (VENELEX) ointment   Topical BID    clonazePAM (KlonoPIN) tablet 2 mg  2 mg Per G Tube TID    oxyCODONE (ROXICODONE) 5 mg/5 mL oral solution 30 mg  30 mg Per G Tube Q8H    ketamine (KETALAR) 500 mg in 0.9% sodium chloride 500 mL infusion  0.05-0.2 mg/kg/hr IntraVENous TITRATE    LORazepam (ATIVAN) injection 4 mg  4 mg IntraVENous Q4H PRN    NOREPINephrine (LEVOPHED) 8 mg in 5% dextrose 250mL (32 mcg/mL) infusion  0.5-16 mcg/min IntraVENous TITRATE    dexmedeTOMidine in 0.9 % NaCl (PRECEDEX) 400 mcg/100 mL (4 mcg/mL) infusion soln  0.1-1.5 mcg/kg/hr IntraVENous TITRATE    midazolam (VERSED) 100 mg in 0.9% sodium chloride 100 mL infusion  0-15 mg/hr IntraVENous TITRATE    epoprostenol (VELETRI) 30 mcg/mL in 0.9% sodium chloride 50 mL inhalation solution  30 ng/kg/min (Ideal) Inhalation CONTINUOUS    chlorhexidine (ORAL CARE KIT) 0.12 % mouthwash 15 mL  15 mL Oral Q12H    fentaNYL (PF) 1,500 mcg/30 mL (50 mcg/mL) infusion  0-300 mcg/hr IntraVENous TITRATE    sodium chloride (NS) flush 5-40 mL  5-40 mL IntraVENous Q8H    sodium chloride (NS) flush 5-40 mL  5-40 mL IntraVENous PRN    sodium chloride (NS) flush 5-40 mL  5-40 mL IntraVENous Q8H    sodium chloride (NS) flush 5-40 mL  5-40 mL IntraVENous PRN    acetaminophen (TYLENOL) suppository 650 mg  650 mg Rectal Q6H PRN    polyethylene glycol (MIRALAX) packet 17 g  17 g Oral DAILY PRN    ondansetron (ZOFRAN ODT) tablet 4 mg  4 mg Oral Q8H PRN    Or    ondansetron (ZOFRAN) injection 4 mg  4 mg IntraVENous Q6H PRN    acetaminophen (TYLENOL) solution 650 mg  650 mg Oral Q4H PRN    albuterol-ipratropium (DUO-NEB) 2.5 MG-0.5 MG/3 ML  3 mL Nebulization Q4H PRN    pantoprazole (PROTONIX) 40 mg in 0.9% sodium chloride 10 mL injection  40 mg IntraVENous DAILY    budesonide (PULMICORT) 250 mcg/2ml nebulizer susp  250 mcg Nebulization BID RT    glucose chewable tablet 16 g  4 Tablet Oral PRN    dextrose (D50W) injection syrg 12.5-25 g  25-50 mL IntraVENous PRN    glucagon (GLUCAGEN) injection 1 mg  1 mg IntraMUSCular PRN    insulin lispro (HUMALOG) injection   SubCUTAneous Q6H       Objective:   Vital Signs:  Visit Vitals  BP (!) 100/48   Pulse 67   Temp (!) 100.7 °F (38.2 °C)   Resp (!) 34   Ht 5' 5\" (1.651 m)   Wt 151.9 kg (334 lb 14.1 oz)   SpO2 92%   BMI 55.73 kg/m²    O2 Flow Rate (L/min): 5 l/min O2 Device: Ventilator Temp (24hrs), Av °F (37.8 °C), Min:99.1 °F (37.3 °C), Max:101.2 °F (38.4 °C)           Intake/Output:     Intake/Output Summary (Last 24 hours) at 2021 0959  Last data filed at 8/23/2021 0800  Gross per 24 hour   Intake 4673.9 ml   Output 4830 ml   Net -156.1 ml       Physical Exam:  Supine, intubated, sedated,   ETT in place, RIJ CVC  Resps even and unlabored, symmetric chest rise on MV  Reg rate, no heave/rub  Peripheral pulses intact  Abd soft, obese, nontender  Skin warm, dry    LABS AND  DATA: Personally reviewed  Recent Labs     08/23/21 0141 08/22/21  0405   WBC 8.3 6.3   HGB 11.4* 11.0*   HCT 39.1 38.8   * 149*     Recent Labs     08/23/21  0141 08/22/21  0405   * 147*   K 4.5 4.0   * 113*   CO2 33* 31   BUN 30* 29*   CREA 0.99 0.90   * 154*   CA 8.7 8.9   MG 2.2 2.6*   PHOS 3.8 2.9     Recent Labs     08/23/21 0141 08/22/21  0405   AP 49 40*   TP 7.1 6.9   ALB 2.7* 2.9*   GLOB 4.4* 4.0     No results for input(s): INR, PTP, APTT, INREXT, INREXT in the last 72 hours. No results for input(s): PHI, PCO2I, PO2I, FIO2I in the last 72 hours. Recent Labs     08/23/21 0141 08/22/21  0405   CPK 4,173* 1,956*         Ventilator Settings:  Mode Rate Tidal Volume Pressure FiO2 PEEP   Pressure control   270 ml    100 % 18 cm H20     Peak airway pressure: 40 cm H2O    Minute ventilation: 9.45 l/min        MEDS: Reviewed    Chest X-Ray:  CXR Results  (Last 48 hours)    None          Multidisciplinary Rounds Completed:  No    ABCDEF Bundle/Checklist Completed:  Yes    SPECIAL EQUIPMENT  ventilator    DISPOSITION  Stay in ICU    CRITICAL CARE CONSULTANT NOTE  I had a face to face encounter with the patient, reviewed and interpreted patient data including clinical events, labs, images, vital signs, I/O's, and examined patient.   I have discussed the case and the plan and management of the patient's care with the consulting services, the bedside nurses and the respiratory therapist.      NOTE OF PERSONAL INVOLVEMENT IN CARE   This patient has a high probability of imminent, clinically significant deterioration, which requires the highest level of preparedness to intervene urgently. I participated in the decision-making and personally managed or directed the management of the following life and organ supporting interventions that required my frequent assessment to treat or prevent imminent deterioration. I personally spent 40 minutes of critical care time. This is time spent at this critically ill patient's bedside actively involved in patient care as well as the coordination of care. This does not include any procedural time which has been billed separately.     Remigio Lyons MD [facility]    8/23/2021

## 2021-08-24 NOTE — PROGRESS NOTES
0710-Bedside and Verbal shift change report received from [place], RN (offgoing nurse) to Gaye Shultz RN (oncoming nurse). Report included the following information SBAR, Kardex, MAR and Recent Results. 0800-Shift assessment complete-see flowsheet for findings. Pt. remains intubated and sedated on Precedex, Ketamine, Fentanyl and Versed; unresponsive. Desats with any stimulus, to include mouth care. Skin is warm and dry. 0946-Precedex stopped at this time. 1050-IDT rounds held at this time. 1200-Reassessment complete-no changes noted. 1600-Reassessment complete at this time. Pt. is now opening her eyes, but not tracking or focusing. No command following. Continues with cough and gag with suction. No purposeful movements. 1703-Ketamine stopped at this time after weaning since 1151.    1900-Bedside and Verbal shift change report given to Tim Alexander RN (offgoing nurse) to Gaye Shultz RN (oncoming nurse). Report included the following information SBAR, Kardex, MAR and Recent Results.

## 2021-08-24 NOTE — PROGRESS NOTES
[facility]    ICU TEAM Progress Note    Name: Ana Draper   : 1985   MRN: [id number]   Date: 2021           ICU Assessment & Plan of Care       Ana Draper Is a 29 y.o. female with asthma who was  admitted for COVID-19 PNA. NEURO  Pain, agitation, sedation  -fent, versed, precedex, ketamine  gtts for deep sedation  D/c precedex today and observe    CARDIAC  Suspect septic shock at this time   Start pressors      RESPIRATORY  COVID-19 PNA   cont LPV with goal 6cc/kg IBW, plat < 30, DP<15, pH < 7.2, pO2 60-90  - currently ACPC, 285 (~4cc/kg due to plats) / 30 / 14 / 100% PEEP 18  - s/p toci  - taper off and stop dexamethasone  No more proning    RENAL  Rhabdomyolysis  - D/c'd paralytic on     GASTROINTESTINAL   tube feedings    HEMATOLOGIC  Started on full dose lovenox   - standard transfusion triggers    ID  Fever : suspect has sinusitis  Purulent foul smelling material from nostril : will add diflucan and broad spectrum abx  Add vancomycin    ENDOCRINE   Hyperglycemia  - SSI for now, but will likely need to add long-acting given increase in steroids  - currently at goal 140-180      DVT Prophylaxis: SCD, enoxaparin  U - Ulcer Prophylaxis: PPI  G - Glycemic Control: Insulin  B - Bowel Regimen: miralax  Tubes: ETT and Orogastric Tube  Lines: RI central line  PICC line ordered so that central line can be d/c'd  Drains: Durbin Catheter   Prognosis terminal  Impending death    Prognosis poor    Subjective:   Progress Note: 2021      Reason for ICU Admission: COVID-19 PNA     HPI: 29 y. o. female, with significant pmhx of asthma, who presents via EMS [facility] inpatient to the ED with report of decompensation over the last 12 hours due to covid PNA.  Over the course of the past 12 hours pt went from NC to Bipap to intubation for refractory hypoxemia.     Upon arrival to [facility] ED pt SPO2 in the 70's; however, as per EMS during transport she had to be manually ventilated with ambu bag and they did not have a peep valve. Initial ABG 7.35/44/58/25. Placed on peep of 15 with 100% FiO2. Will be admitted to the ICU. Hospital course  8/13 - admitted to ICU. ECMO consult called, but declined as currently too stable. Vent optimized and did not meet criteria for proning. 8/14 - HEYDI overnight. 8/15 - had done well during the day with PEEP down to 18, Fi down to 60%. Desat overnight with Fi back up to 100%. Pt increasingly restless (on same sedation), followed by self-extubation. Re-intubated but no reserves with prolonged hypoxia in 40s. Now sedated, paralyzed, and on epo.  8/16 - Patient was changed to supine position overnight and she had worsening hypoxemia and now on 100% Fio2. 8/17 - Patient again changed back to supine potion from prone position but had severe hypoxemic events which later improved with recruitment maneuvers. Still paralyzed with nimbex. 8/18. Weaned off of nimbex. Was in prone position overnight. On 100% Fio2 and PEEP of 18cm of water. Peak pressure ~32, pltp is 29.   8/19 - blood from ett and nose  8/21: remains on 100% fio2 , peep 18  No improvement in blood gases   8/22: after supining her , her o2 saturations were in the 30's for more than 1 hour  8/23: spiking high grade temps, slightly hypotensive  8/24: temp spikes have decreased but desaturates to 30's even just turning her    Home Medications:     Prior to Admission medications    Medication Sig Start Date End Date Taking? Authorizing Provider   lidocaine (LIDODERM) 5 % 1 Patch by TransDERmal route daily as needed (back pain). Apply patch to the affected area for 12 hours a day and remove for 12 hours a day. Provider, Historical   ProAir HFA 90 mcg/actuation inhaler Take 2 Puffs by inhalation every four (4) hours as needed for Wheezing, Shortness of Breath or Cough.  7/30/21   Giselle Hart NP   fluticasone propionate (FLOVENT HFA) 110 mcg/actuation inhaler Take 1 Puff by inhalation every twelve (12) hours. 7/30/21   Leydi Goodrich NP   montelukast (SINGULAIR) 10 mg tablet Take 1 Tablet by mouth daily. 7/30/21   Leydi Goodrich NP   famotidine (PEPCID) 20 mg tablet Take 1 Tablet by mouth two (2) times a day. 7/30/21   Leydi Goodrich NP   albuterol (PROVENTIL VENTOLIN) 2.5 mg /3 mL (0.083 %) nebu Take 3 mL by inhalation every four (4) hours as needed for Wheezing, Shortness of Breath or Cough.  6/7/21   Leydi Goodrich NP       Current Meds:     Current Facility-Administered Medications   Medication Dose Route Frequency    rocuronium 10 mg/mL injection        vancomycin (VANCOCIN) 1500 mg in  ml infusion  1,500 mg IntraVENous Q12H    NOREPINephrine (LEVOPHED) 8 mg in 5% dextrose 250mL (32 mcg/mL) infusion  0.5-16 mcg/min IntraVENous TITRATE    dexamethasone (DECADRON) 4 mg/mL injection 6 mg  6 mg IntraVENous Q24H    cefepime (MAXIPIME) 2 g in 0.9% sodium chloride (MBP/ADV) 100 mL MBP  2 g IntraVENous Q8H    metroNIDAZOLE (FLAGYL) IVPB premix 500 mg  500 mg IntraVENous Q12H    fluconazole (DIFLUCAN) 400mg/200 mL IVPB (premix)  400 mg IntraVENous Q24H    enoxaparin (LOVENOX) injection 40 mg  40 mg SubCUTAneous Q12H    alcohol 62% (NOZIN) nasal  1 Ampule  1 Ampule Topical Q12H    lactated Ringers infusion  75 mL/hr IntraVENous CONTINUOUS    white petrolatum-mineral oiL (SOOTHE NIGHT TIME) 80-20 % ophthalmic ointment   Both Eyes Q12H    senna-docusate (PERICOLACE) 8.6-50 mg per tablet 1 Tablet  1 Tablet Per G Tube Q12H    balsam peru-castor oiL (VENELEX) ointment   Topical BID    clonazePAM (KlonoPIN) tablet 2 mg  2 mg Per G Tube TID    oxyCODONE (ROXICODONE) 5 mg/5 mL oral solution 30 mg  30 mg Per G Tube Q8H    ketamine (KETALAR) 500 mg in 0.9% sodium chloride 500 mL infusion  0.05-0.2 mg/kg/hr IntraVENous TITRATE    LORazepam (ATIVAN) injection 4 mg  4 mg IntraVENous Q4H PRN    NOREPINephrine (LEVOPHED) 8 mg in 5% dextrose 250mL (32 mcg/mL) infusion  0.5-16 mcg/min IntraVENous TITRATE    midazolam (VERSED) 100 mg in 0.9% sodium chloride 100 mL infusion  0-15 mg/hr IntraVENous TITRATE    chlorhexidine (ORAL CARE KIT) 0.12 % mouthwash 15 mL  15 mL Oral Q12H    fentaNYL (PF) 1,500 mcg/30 mL (50 mcg/mL) infusion  0-300 mcg/hr IntraVENous TITRATE    sodium chloride (NS) flush 5-40 mL  5-40 mL IntraVENous Q8H    sodium chloride (NS) flush 5-40 mL  5-40 mL IntraVENous PRN    sodium chloride (NS) flush 5-40 mL  5-40 mL IntraVENous Q8H    sodium chloride (NS) flush 5-40 mL  5-40 mL IntraVENous PRN    acetaminophen (TYLENOL) suppository 650 mg  650 mg Rectal Q6H PRN    polyethylene glycol (MIRALAX) packet 17 g  17 g Oral DAILY PRN    ondansetron (ZOFRAN ODT) tablet 4 mg  4 mg Oral Q8H PRN    Or    ondansetron (ZOFRAN) injection 4 mg  4 mg IntraVENous Q6H PRN    acetaminophen (TYLENOL) solution 650 mg  650 mg Oral Q4H PRN    albuterol-ipratropium (DUO-NEB) 2.5 MG-0.5 MG/3 ML  3 mL Nebulization Q4H PRN    pantoprazole (PROTONIX) 40 mg in 0.9% sodium chloride 10 mL injection  40 mg IntraVENous DAILY    budesonide (PULMICORT) 250 mcg/2ml nebulizer susp  250 mcg Nebulization BID RT    glucose chewable tablet 16 g  4 Tablet Oral PRN    dextrose (D50W) injection syrg 12.5-25 g  25-50 mL IntraVENous PRN    glucagon (GLUCAGEN) injection 1 mg  1 mg IntraMUSCular PRN    insulin lispro (HUMALOG) injection   SubCUTAneous Q6H       Objective:   Vital Signs:  Visit Vitals  BP (!) 91/53   Pulse 65   Temp 97.3 °F (36.3 °C)   Resp 28   Ht 5' 5\" (1.651 m)   Wt 151.9 kg (334 lb 14.1 oz)   SpO2 (!) 83%   BMI 55.73 kg/m²    O2 Flow Rate (L/min): 5 l/min O2 Device: Ventilator Temp (24hrs), Av.1 °F (37.8 °C), Min:97.3 °F (36.3 °C), Max:103 °F (39.4 °C)           Intake/Output:     Intake/Output Summary (Last 24 hours) at 2021 0937  Last data filed at 2021 0800  Gross per 24 hour   Intake 6012.84 ml   Output 5200 ml   Net 812.84 ml       Physical Exam:  Supine, intubated, sedated,   ETT in place, RIJ CVC  Resps even and unlabored, symmetric chest rise on MV  Reg rate, no heave/rub  Peripheral pulses intact  Abd soft, obese, nontender  Skin warm, dry    LABS AND  DATA: Personally reviewed  Recent Labs     08/24/21 0427 08/23/21 0141   WBC 8.6 8.3   HGB 11.6 11.4*   HCT 39.6 39.1   * 142*     Recent Labs     08/24/21 0427 08/23/21 0141    146*   K 4.2 4.5   * 112*   CO2 32 33*   BUN 29* 30*   CREA 0.89 0.99   * 152*   CA 8.7 8.7   MG 2.3 2.2   PHOS 3.5 3.8     Recent Labs     08/24/21 0427 08/23/21 0141   AP 54 49   TP 7.3 7.1   ALB 3.1* 2.7*   GLOB 4.2* 4.4*   LPSE 2,740*  --      No results for input(s): INR, PTP, APTT, INREXT, INREXT in the last 72 hours. No results for input(s): PHI, PCO2I, PO2I, FIO2I in the last 72 hours. Recent Labs     08/24/21 0427 08/23/21 0141   CPK 2,504* 4,173*         Ventilator Settings:  Mode Rate Tidal Volume Pressure FiO2 PEEP   Pressure control   270 ml    100 % 18 cm H20     Peak airway pressure: 41 cm H2O    Minute ventilation: 9.37 l/min      Multidisciplinary Rounds Completed:  No    ABCDEF Bundle/Checklist Completed:  Yes    SPECIAL EQUIPMENT  ventilator    DISPOSITION  Stay in ICU    CRITICAL CARE CONSULTANT NOTE  I had a face to face encounter with the patient, reviewed and interpreted patient data including clinical events, labs, images, vital signs, I/O's, and examined patient. I have discussed the case and the plan and management of the patient's care with the consulting services, the bedside nurses and the respiratory therapist.      NOTE OF PERSONAL INVOLVEMENT IN CARE   This patient has a high probability of imminent, clinically significant deterioration, which requires the highest level of preparedness to intervene urgently.  I participated in the decision-making and personally managed or directed the management of the following life and organ supporting interventions that required my frequent assessment to treat or prevent imminent deterioration. I personally spent 40 minutes of critical care time. This is time spent at this critically ill patient's bedside actively involved in patient care as well as the coordination of care. This does not include any procedural time which has been billed separately.     June Mejias MD [facility]    8/24/2021

## 2021-08-24 NOTE — PROGRESS NOTES
Follow up regarding PICC order. PICC ordered to replace IJ CVC. Discussed with Dr. Devon Andersen, therapy standards of practice do not recommend to replace one central line for another central line. Okay with MD to hold on placing PICC at this time.

## 2021-08-24 NOTE — WOUND CARE
Wound Care consult:  Chart reviewed. Patient apparently has a neck wound that was present on admission. The wound is described as a pressure injury stage 2 and the wound is open to air and pink. No drainage has been documented. Patient update: patient will most likely die from Covid 19 within the next day or 2. Will defer this exam at this time as she is way too unstable to turn or even reposition. Her oxygen sats dropped to 60% with mouth care today.     Lennie Muñiz RN, BSN, [employer]

## 2021-08-24 NOTE — PROGRESS NOTES
Veletri discontinued at [facility] 81. after being weaned by 10 Z8Q per policy and doctor order

## 2021-08-24 NOTE — PROGRESS NOTES
Palliative Medicine Consult  Keith: [phone])    Patient Name: Ana Draper  YOB: 1985    Date of Initial Consult: 8/17/2021  Reason for Consult: Care Decisions  Requesting Provider: Silvana Albright MD  Primary Care Physician: Xuan Mckeon NP     SUMMARY:   Ana Draper is a 29 y.o. with a past history of asthma and obesity, who was admitted initially to [facility] on 8/12 for worsening COVID symptoms after a positive test in the ED the day before. She rapidly decompensated over the next 24 hours and was then electively intubated in preparation for transfer to [facility] on  8/13/2021. Once here, she was found to have an SPO2 in the 70's, and had to be manually ventilated with an ambu bag on transfer. She was put on a vent with a peep of 15 and 100% FiO2 and admitted to the ICU. This hospitalization she was evaluated for ECMO but her BMI is quite high, and she was ventilating OK, and was tolerating proning  She continues to have some issues with fighting the vent, and remains paralyzed, but is tolerating proning     Many of her immediate family have active COVID symptoms (12 total), but her mom is hospitalized at [facility]. She is aware that her daughter is here, but is hard to understand on the phone. Her sister who would be the next [facility] is also sick, and struggling emotionally. She has also deferred, so next [facility] Set[facility] is patients grandmother. Her grandmother is trying to keep mom in the loop, and has provided her with my phone number to call if she feels she is able to talk. PALLIATIVE DIAGNOSES:   1. Goals of care  2. Shortness of breath  3. COVID-19  4. Anxiety  5. Agitation     PLAN:   1. Spoke with patients mom and grandmother today separately  2. Updated them on her current status- and how she is still on max support. I shared with them that we are taking it minute by minute, and it still remains true that she may pass at any time.   She is not tolerating any movement, or turning, so is no longer being proned. 3. Mom asked questions about \"what kind of coma\" is she in- I explained the sedation medications she has been on, and the paralytic- then we talked about the fact that she is not \"fighting\" anymore, so we are weaning her off to see if she is still in there somewhere, but given how hypoxic she has been, we suspect that she may have some neurological damage  4. Family understands and are all waiting by the phone  5. Will call them later today to give them an update at the end of the day, unless something changes before then  6. Initial consult note routed to primary continuity provider and/or primary health care team members  7. Communicated plan of care with: Palliative IDTAma[facility] 192 Team     GOALS OF CARE / TREATMENT PREFERENCES:     GOALS OF CARE:  Patient/Health Care Proxy Stated Goals: Prolong life    TREATMENT PREFERENCES:   Code Status: DNR    Advance Care Planning:  [x] [facility] Interdisciplinary Team has updated the ACP Navigator with Health Care Decision Maker and Patient Capacity    Primary Decision Maker: Adia Rankin - Mother - [phone]    Primary Decision Maker (Active): Estela Mccloud \"Zip\" - Grandparent - [phone]    Advance Care Planning 8/12/2021   Patient's Healthcare Decision Maker is: Legal Next of Kin   Confirm Advance Directive None   Patient Would Like to Complete Advance Directive No   Does the patient have other document types Other (comment)       Medical Interventions: Other (comment) (full agressive care but no CPR in the event of cardiac death)     Other Instructions: Other:    As far as possible, the palliative care team has discussed with patient / health care proxy about goals of care / treatment preferences for patient.      HISTORY:     History obtained from: chart, family    CHIEF COMPLAINT: none, sedated    HPI/SUBJECTIVE:    The patient is:   [] Verbal and participatory  [x] Non-participatory due to:   condition     Clinical Pain Assessment (nonverbal scale for severity on nonverbal patients):   Clinical Pain Assessment  Severity: 0     Activity (Movement): Lying quietly, normal position    Duration: for how long has pt been experiencing pain (e.g., 2 days, 1 month, years)  Frequency: how often pain is an issue (e.g., several times per day, once every few days, constant)     FUNCTIONAL ASSESSMENT:     Palliative Performance Scale (PPS):  PPS: 20       PSYCHOSOCIAL/SPIRITUAL SCREENING:     Palliative IDT has assessed this patient for cultural preferences / practices and a referral made as appropriate to needs (Cultural Services, Patient Advocacy, Ethics, etc.)    Any spiritual / Cheondoism concerns:  [] Yes /  [x] No    Caregiver Burnout:  [] Yes /  [x] No /  [] No Caregiver Present      Anticipatory grief assessment:   [x] Normal  / [] Maladaptive       ESAS Anxiety: Anxiety: 0    ESAS Depression: Depression: 0        REVIEW OF SYSTEMS:     Positive and pertinent negative findings in ROS are noted above in HPI. The following systems were [x] reviewed / [] unable to be reviewed as noted in HPI  Other findings are noted below. Systems: constitutional, ears/nose/mouth/throat, respiratory, gastrointestinal, genitourinary, musculoskeletal, integumentary, neurologic, psychiatric, endocrine. Positive findings noted below. Modified ESAS Completed by: provider   Fatigue: 10     Depression: 0 Pain: 0   Anxiety: 0 Nausea: 0     Dyspnea: 0           Stool Occurrence(s): 1        PHYSICAL EXAM:     From RN flowsheet:  Wt Readings from Last 3 Encounters:   08/13/21 334 lb 14.1 oz (151.9 kg)   08/12/21 311 lb (141.1 kg)   07/30/21 316 lb (143.3 kg)     Blood pressure (!) 104/54, pulse (!) 54, temperature 97.3 °F (36.3 °C), resp. rate 28, height 5' 5\" (1.651 m), weight 334 lb 14.1 oz (151.9 kg), SpO2 (!) 87 %.     Pain Scale 1: Behavioral Pain Scale (BPS)  Pain Intensity 1: 0              Pain Intervention(s) 1: Medication (see MAR)  Last bowel movement, if known:     Constitutional: proning, appears calm for me  Respiratory: vent       HISTORY:     Active Problems:    Acute hypoxemic respiratory failure due to COVID-19 [facility]) (8/13/2021)      Goals of care, counseling/discussion ()      Shortness of breath ()      Anxiety ()      Agitation ()      History reviewed. No pertinent past medical history. History reviewed. No pertinent surgical history. History reviewed. No pertinent family history. History reviewed, no pertinent family history. Social History     Tobacco Use    Smoking status: Never Smoker    Smokeless tobacco: Never Used   Substance Use Topics    Alcohol use: Yes     Comment: occasionally     Allergies   Allergen Reactions    Lactose Nausea Only    Pcn [Penicillins] Itching     Patient unsure of penicillin allergy.   Tolerates ceftriaxone      Current Facility-Administered Medications   Medication Dose Route Frequency    rocuronium 10 mg/mL injection        [START ON 8/25/2021] dexamethasone (DECADRON) 4 mg/mL injection 9 mg  9 mg IntraVENous Q24H    epoprostenol (VELETRI) 30 mcg/mL in 0.9% sodium chloride 50 mL inhalation solution  40 ng/kg/min (Ideal) Inhalation CONTINUOUS    insulin glargine (LANTUS) injection 8 Units  8 Units SubCUTAneous Q24H    vancomycin (VANCOCIN) 1500 mg in  ml infusion  1,500 mg IntraVENous Q12H    NOREPINephrine (LEVOPHED) 8 mg in 5% dextrose 250mL (32 mcg/mL) infusion  0.5-16 mcg/min IntraVENous TITRATE    cefepime (MAXIPIME) 2 g in 0.9% sodium chloride (MBP/ADV) 100 mL MBP  2 g IntraVENous Q8H    metroNIDAZOLE (FLAGYL) IVPB premix 500 mg  500 mg IntraVENous Q12H    fluconazole (DIFLUCAN) 400mg/200 mL IVPB (premix)  400 mg IntraVENous Q24H    enoxaparin (LOVENOX) injection 40 mg  40 mg SubCUTAneous Q12H    alcohol 62% (NOZIN) nasal  1 Ampule  1 Ampule Topical Q12H    white petrolatum-mineral oiL (SOOTHE NIGHT TIME) 80-20 % ophthalmic ointment   Both Eyes Q12H    senna-docusate (PERICOLACE) 8.6-50 mg per tablet 1 Tablet  1 Tablet Per G Tube Q12H    balsam peru-castor oiL (VENELEX) ointment   Topical BID    clonazePAM (KlonoPIN) tablet 2 mg  2 mg Per G Tube TID    oxyCODONE (ROXICODONE) 5 mg/5 mL oral solution 30 mg  30 mg Per G Tube Q8H    ketamine (KETALAR) 500 mg in 0.9% sodium chloride 500 mL infusion  0.05-0.2 mg/kg/hr IntraVENous TITRATE    LORazepam (ATIVAN) injection 4 mg  4 mg IntraVENous Q4H PRN    NOREPINephrine (LEVOPHED) 8 mg in 5% dextrose 250mL (32 mcg/mL) infusion  0.5-16 mcg/min IntraVENous TITRATE    midazolam (VERSED) 100 mg in 0.9% sodium chloride 100 mL infusion  0-15 mg/hr IntraVENous TITRATE    chlorhexidine (ORAL CARE KIT) 0.12 % mouthwash 15 mL  15 mL Oral Q12H    fentaNYL (PF) 1,500 mcg/30 mL (50 mcg/mL) infusion  0-300 mcg/hr IntraVENous TITRATE    sodium chloride (NS) flush 5-40 mL  5-40 mL IntraVENous Q8H    sodium chloride (NS) flush 5-40 mL  5-40 mL IntraVENous PRN    sodium chloride (NS) flush 5-40 mL  5-40 mL IntraVENous Q8H    sodium chloride (NS) flush 5-40 mL  5-40 mL IntraVENous PRN    acetaminophen (TYLENOL) suppository 650 mg  650 mg Rectal Q6H PRN    polyethylene glycol (MIRALAX) packet 17 g  17 g Oral DAILY PRN    ondansetron (ZOFRAN ODT) tablet 4 mg  4 mg Oral Q8H PRN    Or    ondansetron (ZOFRAN) injection 4 mg  4 mg IntraVENous Q6H PRN    acetaminophen (TYLENOL) solution 650 mg  650 mg Oral Q4H PRN    albuterol-ipratropium (DUO-NEB) 2.5 MG-0.5 MG/3 ML  3 mL Nebulization Q4H PRN    pantoprazole (PROTONIX) 40 mg in 0.9% sodium chloride 10 mL injection  40 mg IntraVENous DAILY    budesonide (PULMICORT) 250 mcg/2ml nebulizer susp  250 mcg Nebulization BID RT    glucose chewable tablet 16 g  4 Tablet Oral PRN    dextrose (D50W) injection syrg 12.5-25 g  25-50 mL IntraVENous PRN    glucagon (GLUCAGEN) injection 1 mg  1 mg IntraMUSCular PRN    insulin lispro (HUMALOG) injection   SubCUTAneous Q6H          LAB AND IMAGING FINDINGS:     Lab Results   Component Value Date/Time    WBC 8.6 08/24/2021 04:27 AM    HGB 11.6 08/24/2021 04:27 AM    PLATELET 428 (L) 57/22/8803 04:27 AM     Lab Results   Component Value Date/Time    Sodium 144 08/24/2021 04:27 AM    Potassium 4.2 08/24/2021 04:27 AM    Chloride 110 (H) 08/24/2021 04:27 AM    CO2 32 08/24/2021 04:27 AM    BUN 29 (H) 08/24/2021 04:27 AM    Creatinine 0.89 08/24/2021 04:27 AM    Calcium 8.7 08/24/2021 04:27 AM    Magnesium 2.3 08/24/2021 04:27 AM    Phosphorus 3.5 08/24/2021 04:27 AM      Lab Results   Component Value Date/Time    Alk. phosphatase 54 08/24/2021 04:27 AM    Protein, total 7.3 08/24/2021 04:27 AM    Albumin 3.1 (L) 08/24/2021 04:27 AM    Globulin 4.2 (H) 08/24/2021 04:27 AM     No results found for: INR, PTMR, PTP, PT1, PT2, APTT, INREXT, INREXT   Lab Results   Component Value Date/Time    Ferritin 1,234 (H) 08/13/2021 05:10 AM      Lab Results   Component Value Date/Time    pH 7.32 (L) 08/24/2021 04:27 AM    PCO2 54 (H) 08/24/2021 04:27 AM    PO2 54 (L) 08/24/2021 04:27 AM     No components found for: [place]   Lab Results   Component Value Date/Time    CK 2,504 (H) 08/24/2021 04:27 AM                Total time: 35m  Counseling / coordination time, spent as noted above: 20m  > 50% counseling / coordination?: y    Prolonged service was provided for  []30 min   []75 min in face to face time in the presence of the patient, spent as noted above. Time Start:   Time End:   Note: this can only be billed with 40285 (initial) or 35613 (follow up). If multiple start / stop times, list each separately.

## 2021-08-24 NOTE — PROGRESS NOTES
Spiritual Care Assessment/Progress Note  [facility]      NAME: Trey Leos      MRN: [id number]  AGE: 29 y.o. SEX: female  Christianity Affiliation: Pentecostal   Language: English     8/24/2021     Total Time (in minutes): 5     Spiritual Assessment begun in [facility]ITICAL [facility]ARE 3 through conversation with:         []Patient        [] Family    [] Friend(s)        Reason for Consult: Palliative Care, Follow-up     Spiritual beliefs: (Please include comment if needed)     [] Identifies with a nita tradition:         [] Supported by a nita community:            [] Claims no spiritual orientation:           [] Seeking spiritual identity:                [] Adheres to an individual form of spirituality:           [x] Not able to assess:                           Identified resources for coping:      [] Prayer                               [] Music                  [] Guided Imagery     [] Family/friends                 [] Pet visits     [] Devotional reading                         [x] Unknown     [] Other:                                               Interventions offered during this visit: (See comments for more details)    Patient Interventions: Other (comment) (Visit Attempt)           Plan of Care:     [] Support spiritual and/or cultural needs    [] Support AMD and/or advance care planning process      [] Support grieving process   [] Coordinate Rites and/or Rituals    [] Coordination with community clergy   [] No spiritual needs identified at this time   [] Detailed Plan of Care below (See Comments)  [] Make referral to Music Therapy  [] Make referral to Pet Therapy     [] Make referral to Addiction services  [] Make referral to [facility]  [] Make referral to Spiritual Care Partner  [] No future visits requested        [x] Follow up upon further referrals     Comments:     Attempted to provide follow-up Palliative Care assessment for Ms. Conklin Hearing in [id number]. Pt is COVID positive.  Performed chart review before the attempt. Pt was intubated, was in non-communicative status. Unable to assess.     [id number] [facility], M.Div,Th.M, [address] Provider   Paging Service [phone])

## 2021-08-25 NOTE — PROGRESS NOTES
Palliative Medicine Consult  Keith: [phone])    Patient Name: Manjit Garcia  YOB: 1985    Date of Initial Consult: 8/17/2021  Reason for Consult: Care Decisions  Requesting Provider: Zurdo Daley MD  Primary Care Physician: Maddison Flor NP     SUMMARY:   Manjit Garcia is a 29 y.o. with a past history of asthma and obesity, who was admitted initially to [address] [place] [address] on 8/12 for worsening COVID symptoms after a positive test in the ED the day before. She rapidly decompensated over the next 24 hours and was then electively intubated in preparation for transfer to [facility] on  8/13/2021. Once here, she was found to have an SPO2 in the 70's, and had to be manually ventilated with an ambu bag on transfer. She was put on a vent with a peep of 15 and 100% FiO2 and admitted to the ICU. This hospitalization she was evaluated for ECMO but her BMI is quite high, and she was ventilating OK, and was tolerating proning  She continues to have some issues with fighting the vent, and remains paralyzed, but is tolerating proning     Many of her immediate family have active COVID symptoms (12 total), but her mom is hospitalized at [facility]. She is aware that her daughter is here, but is hard to understand on the phone. Her sister who would be the next [facility] is also sick, and struggling emotionally. She has also deferred, so next Elva Calero is patients grandmother. Her grandmother is trying to keep mom in the loop, and has provided her with my phone number to call if she feels she is able to talk. PALLIATIVE DIAGNOSES:   1. Goals of care  2. Shortness of breath  3. COVID-19  4. Anxiety  5. Agitation     PLAN:   1.  Spoke with patients mom at length- she is having a very hard time accepting that Bob Faith is dying, and will not be walking out of this hospital.  We talked at length about her status, her sedation again (she wants to know why she was \"put in a coma\" and why we cannot reverse the coma so she can wake up), her lungs and the increasing PEEP on the vent, and the fact that her heart is strong so she may stay in this state for a while, but she is still dying and we are just working hard to pause that process  2. She got a lot of sobering information today, so we did not make any decisions, but I let her know that if she wanted to stop any invasive/painful/not contributing to comfort interventions given that we are not \"making her better\", that is an option  3. I then talked to her grandmother- she understands better how sick Chasidy Herman is. I shared with her my conversation with [address] mom, because she might need support. Grandmother shared that she has been trying to express how sick Chasidy Herman is, but her mom is not fully understanding likely because she is not able to see her, and in the beginning, Chasidy Herman was not as sick. 4. I let her know that we are doing what we can to keep her here with us, and I will be here for support if they need anything, but for now will give her mom space  5. Initial consult note routed to primary continuity provider and/or primary health care team members  6. Communicated plan of care with: Palliative IDT, [facility] [address] Team     GOALS OF CARE / TREATMENT PREFERENCES:     GOALS OF CARE:  Patient/Health Care Proxy Stated Goals: Prolong life    TREATMENT PREFERENCES:   Code Status: DNR    Advance Care Planning:  [x] [facility] Interdisciplinary Team has updated the ACP Navigator with Health Care Decision Maker and Patient Capacity    Primary Decision Maker (Active): Subhash Zuniga - Mother - [phone]    Advance Care Planning 8/12/2021   Patient's Healthcare Decision Maker is: Legal Next of Kin   Confirm Advance Directive None   Patient Would Like to Complete Advance Directive No   Does the patient have other document types Other (comment)       Medical Interventions: Other (comment) (full agressive care but no CPR in the event of cardiac death)     Other Instructions:          Other:    As far as possible, the palliative care team has discussed with patient / health care proxy about goals of care / treatment preferences for patient. HISTORY:     History obtained from: chart, family    CHIEF COMPLAINT: none, sedated    HPI/SUBJECTIVE:    The patient is:   [] Verbal and participatory  [x] Non-participatory due to:   condition     Clinical Pain Assessment (nonverbal scale for severity on nonverbal patients):   Clinical Pain Assessment  Severity: 0     Activity (Movement): Lying quietly, normal position    Duration: for how long has pt been experiencing pain (e.g., 2 days, 1 month, years)  Frequency: how often pain is an issue (e.g., several times per day, once every few days, constant)     FUNCTIONAL ASSESSMENT:     Palliative Performance Scale (PPS):  PPS: 20       PSYCHOSOCIAL/SPIRITUAL SCREENING:     Palliative IDT has assessed this patient for cultural preferences / practices and a referral made as appropriate to needs (Cultural Services, Patient Advocacy, Ethics, etc.)    Any spiritual / Jain concerns:  [] Yes /  [x] No    Caregiver Burnout:  [] Yes /  [x] No /  [] No Caregiver Present      Anticipatory grief assessment:   [x] Normal  / [] Maladaptive       ESAS Anxiety: Anxiety: 0    ESAS Depression: Depression: 0        REVIEW OF SYSTEMS:     Positive and pertinent negative findings in ROS are noted above in HPI. The following systems were [x] reviewed / [] unable to be reviewed as noted in HPI  Other findings are noted below. Systems: constitutional, ears/nose/mouth/throat, respiratory, gastrointestinal, genitourinary, musculoskeletal, integumentary, neurologic, psychiatric, endocrine. Positive findings noted below.   Modified ESAS Completed by: provider   Fatigue: 10     Depression: 0 Pain: 0   Anxiety: 0 Nausea: 0     Dyspnea: 0           Stool Occurrence(s): 0        PHYSICAL EXAM:     From RN flowsheet:  Wt Readings from Last 3 Encounters:   08/13/21 334 lb 14.1 oz (151.9 kg)   08/12/21 311 lb (141.1 kg)   07/30/21 316 lb (143.3 kg)     Blood pressure (!) 117/52, pulse (!) 59, temperature 99.1 °F (37.3 °C), resp. rate 28, height 5' 5\" (1.651 m), weight 334 lb 14.1 oz (151.9 kg), SpO2 (!) 87 %. Pain Scale 1: Behavioral Pain Scale (BPS)  Pain Intensity 1: 3              Pain Intervention(s) 1: Medication (see MAR)  Last bowel movement, if known:     Constitutional: proning, appears calm for me  Respiratory: vent       HISTORY:     Active Problems:    Acute hypoxemic respiratory failure due to COVID-19 [facility]) (8/13/2021)      Goals of care, counseling/discussion ()      Shortness of breath ()      Anxiety ()      Agitation ()      History reviewed. No pertinent past medical history. History reviewed. No pertinent surgical history. History reviewed. No pertinent family history. History reviewed, no pertinent family history. Social History     Tobacco Use    Smoking status: Never Smoker    Smokeless tobacco: Never Used   Substance Use Topics    Alcohol use: Yes     Comment: occasionally     Allergies   Allergen Reactions    Lactose Nausea Only    Pcn [Penicillins] Itching     Patient unsure of penicillin allergy.   Tolerates ceftriaxone      Current Facility-Administered Medications   Medication Dose Route Frequency    dexmedeTOMidine in 0.9 % NaCl (PRECEDEX) 400 mcg/100 mL (4 mcg/mL) infusion soln  0.1-1.5 mcg/kg/hr IntraVENous TITRATE    enoxaparin (LOVENOX) injection 40 mg  40 mg SubCUTAneous Q12H    insulin glargine (LANTUS) injection 15 Units  15 Units SubCUTAneous Q24H    dexamethasone (DECADRON) 4 mg/mL injection 9 mg  9 mg IntraVENous Q24H    epoprostenol (VELETRI) 30 mcg/mL in 0.9% sodium chloride 50 mL inhalation solution  10 ng/kg/min (Ideal) Inhalation CONTINUOUS    vancomycin (VANCOCIN) 1500 mg in  ml infusion  1,500 mg IntraVENous Q12H    NOREPINephrine (LEVOPHED) 8 mg in 5% dextrose 250mL (32 mcg/mL) infusion  0.5-16 mcg/min IntraVENous TITRATE    cefepime (MAXIPIME) 2 g in 0.9% sodium chloride (MBP/ADV) 100 mL MBP  2 g IntraVENous Q8H    metroNIDAZOLE (FLAGYL) IVPB premix 500 mg  500 mg IntraVENous Q12H    fluconazole (DIFLUCAN) 400mg/200 mL IVPB (premix)  400 mg IntraVENous Q24H    alcohol 62% (NOZIN) nasal  1 Ampule  1 Ampule Topical Q12H    white petrolatum-mineral oiL (SOOTHE NIGHT TIME) 80-20 % ophthalmic ointment   Both Eyes Q12H    senna-docusate (PERICOLACE) 8.6-50 mg per tablet 1 Tablet  1 Tablet Per G Tube Q12H    balsam peru-castor oiL (VENELEX) ointment   Topical BID    clonazePAM (KlonoPIN) tablet 2 mg  2 mg Per G Tube TID    oxyCODONE (ROXICODONE) 5 mg/5 mL oral solution 30 mg  30 mg Per G Tube Q8H    LORazepam (ATIVAN) injection 4 mg  4 mg IntraVENous Q4H PRN    midazolam (VERSED) 100 mg in 0.9% sodium chloride 100 mL infusion  0-15 mg/hr IntraVENous TITRATE    chlorhexidine (ORAL CARE KIT) 0.12 % mouthwash 15 mL  15 mL Oral Q12H    fentaNYL (PF) 1,500 mcg/30 mL (50 mcg/mL) infusion  0-300 mcg/hr IntraVENous TITRATE    sodium chloride (NS) flush 5-40 mL  5-40 mL IntraVENous Q8H    sodium chloride (NS) flush 5-40 mL  5-40 mL IntraVENous PRN    sodium chloride (NS) flush 5-40 mL  5-40 mL IntraVENous Q8H    sodium chloride (NS) flush 5-40 mL  5-40 mL IntraVENous PRN    acetaminophen (TYLENOL) suppository 650 mg  650 mg Rectal Q6H PRN    polyethylene glycol (MIRALAX) packet 17 g  17 g Oral DAILY PRN    ondansetron (ZOFRAN ODT) tablet 4 mg  4 mg Oral Q8H PRN    Or    ondansetron (ZOFRAN) injection 4 mg  4 mg IntraVENous Q6H PRN    acetaminophen (TYLENOL) solution 650 mg  650 mg Oral Q4H PRN    albuterol-ipratropium (DUO-NEB) 2.5 MG-0.5 MG/3 ML  3 mL Nebulization Q4H PRN    pantoprazole (PROTONIX) 40 mg in 0.9% sodium chloride 10 mL injection  40 mg IntraVENous DAILY    budesonide (PULMICORT) 250 mcg/2ml nebulizer susp  250 mcg Nebulization BID RT    glucose chewable tablet 16 g  4 Tablet Oral PRN    dextrose (D50W) injection syrg 12.5-25 g  25-50 mL IntraVENous PRN    glucagon (GLUCAGEN) injection 1 mg  1 mg IntraMUSCular PRN    insulin lispro (HUMALOG) injection   SubCUTAneous Q6H          LAB AND IMAGING FINDINGS:     Lab Results   Component Value Date/Time    WBC 10.0 08/25/2021 01:55 AM    HGB 10.5 (L) 08/25/2021 01:55 AM    PLATELET 622 38/94/2157 01:55 AM     Lab Results   Component Value Date/Time    Sodium 147 (H) 08/25/2021 01:55 AM    Potassium 3.9 08/25/2021 01:55 AM    Chloride 116 (H) 08/25/2021 01:55 AM    CO2 27 08/25/2021 01:55 AM    BUN 36 (H) 08/25/2021 01:55 AM    Creatinine 0.92 08/25/2021 01:55 AM    Calcium 8.2 (L) 08/25/2021 01:55 AM    Magnesium 2.3 08/25/2021 01:55 AM    Phosphorus 3.7 08/25/2021 01:55 AM      Lab Results   Component Value Date/Time    Alk. phosphatase 46 08/25/2021 01:55 AM    Protein, total 6.4 08/25/2021 01:55 AM    Albumin 2.7 (L) 08/25/2021 01:55 AM    Globulin 3.7 08/25/2021 01:55 AM     No results found for: INR, PTMR, PTP, PT1, PT2, APTT, INREXT, INREXT   Lab Results   Component Value Date/Time    Ferritin 1,234 (H) 08/13/2021 05:10 AM      Lab Results   Component Value Date/Time    pH 7.28 (L) 08/25/2021 02:10 AM    PCO2 54 (H) 08/25/2021 02:10 AM    PO2 69 (L) 08/25/2021 02:10 AM     No components found for: [place]   Lab Results   Component Value Date/Time    CK 1,723 (H) 08/25/2021 01:55 AM                Total time: 35m  Counseling / coordination time, spent as noted above: 20m  > 50% counseling / coordination?: y    Prolonged service was provided for  []30 min   []75 min in face to face time in the presence of the patient, spent as noted above. Time Start:   Time End:   Note: this can only be billed with 34486 (initial) or 98577 (follow up). If multiple start / stop times, list each separately.

## 2021-08-25 NOTE — PROGRESS NOTES
Pharmacy Automatic Renal Dosing Protocol - Antimicrobials    Goal Level: VANCOMYCIN TROUGH GOAL RANGE  Vancomycin Trough: 15 - 20 mcg/mL  (AUC: 400 - 600 mg/hr/Liter/day)     Date Dose & Interval Measured (mcg/mL) Extrapolated (mcg/mL)   8/24 19:43 1500 mg q12h 14.1 13.6                      Impression/Plan:   The vancomycin trough resulted at 13.6 mcg/ml (). Will continue with the current regimen of 1500 mg IV q12h     Pharmacy will follow daily and adjust medications as appropriate for renal function and/or serum levels.     Thank you,  Roland Mariee, PHARMD

## 2021-08-25 NOTE — PROGRESS NOTES
1915: SBAR report received from piter RN at this time. Pt. Care assumed. Assessment of patient completed. Responsive to verbal stimuli, not tracking/focusing with eyes. Withdraws from pain. GCS -2. Oxygen saturations maintaining in mid 80s. MAP less than 65. Awaiting levophed gtt from pharmacy. Pt. Scant oral secretions. Pt. Has coughing fits with oral care and minor physical stimulation. 2056: spoke with pharmacist John Huynh at this time regarding vanc trough levels. Pharmacist stated okay to give 2100 vancomycin dose. 2120: pt. Copious amounts of nasal and oral secretions. Suctioning and mouth care performed at this time. Levophed gtt started. Respiratory therapist at bedside to give breathing treatments. 0145: respiratory therapist called due to vent alarming high expiratory volumes. NP made aware. Came to bedside to assess along with respiratory therapist.    0200: respiratory therapist at bedside to obtain ABG. AM labs drawn at this time per NP request. Vital signs remain in parameters. 0215: NP and respiratory therapist aware of ABG results at this time. No settings to be changed on ventilator per NP. No further orders received for sedation. Vital signs within parameters. 0350: pt. Continually pulling in high expiratory volumes greater than 1000. NP paged at this time. Respiratory therapist at bedside. NP to place order for versed admin. See EMAR for prn medication administration. 0410: pt. Continues to pull in volumes greater than 1000 despite prn medication administration. Per NP pt. To be started back on precedex gtt. Awaiting gtt from pharmacy at this time. Vital signs within parameters. No signs of acute distress noted. 0530: orders received for d dimer and venous blood gas. Respiratory  Therapist at beside. 0208: ventilator changed to different machine at this time by respiratory therapist. Tidal volumes within range. Vital signs within parameters. NP made aware.

## 2021-08-25 NOTE — PROGRESS NOTES
0700: recvd report and assumed care of pt.    0800: assessment as noted. Opens eyes, no command following or tracking. Remains on levo, versed, fentanyl, and 100% FiO2, 20 PEEP on vent. 1200: reassessment unchanged. Ronaldo Fall NP (palliative) had long extensive conversation with pt mom who is not ready to make any changes to plan of care. [phone]: Ronaldo Fall NP also spoke with pt grandmother who has a more realistic understanding of the patient's condition. 1600: reassessment unchanged. Pt still does not tolerate being repositioned without desaturating.

## 2021-08-25 NOTE — PROGRESS NOTES
Nutrition Note    Chart reviewed and case discussed during CCU rounds. MD requested that TF be resumed today at trickle rate. RD placed the order to resume today. TF has been held since 8/19 d/t high residuals, vomiting and pancreatitis. Lipase trended back up yesterday to 2740, from 1002 on 8/20. Per nursing notes, last BM was 8/18. Pt's death remains imminent.      Start Osmolite 1.2 @ 10mL/hr, not to advance + free water flushes 50mL Q 6hr    Electronically signed by Sunita Ortiz RD on 8/25/2021 at 1:49 PM    Contact: [phone]

## 2021-08-25 NOTE — PROGRESS NOTES
[facility]    ICU TEAM Progress Note    Name: Brigida Lentz   : 1985   MRN: [id number]   Date: 2021           ICU Assessment & Plan of Care       Brigida Lentz Is a 29 y.o. female with asthma who was  admitted for COVID-19 PNA. NEURO  Pain, agitation, sedation  -fent, versed,, ketamine  gtts for deep sedation, gets very hypoxic on slight movements. CARDIAC  Suspect septic shock at this time   Start pressors      RESPIRATORY  COVID-19 PNA   cont LPV with goal 6cc/kg IBW, plat < 30, DP<15, pH < 7.2, pO2 60-90  - currently ACPC, 285 (~4cc/kg due to plats) / 30 / 14 / 100% PEEP 18  - s/p toci  - taper off and stop dexamethasone  No more proning    RENAL  Rhabdomyolysis  - D/c'd paralytic on     GASTROINTESTINAL   tube feedings    HEMATOLOGIC  Started on full dose lovenox   - standard transfusion triggers    ID  Fever : suspect has sinusitis  Purulent foul smelling material from nostril : will add diflucan and broad spectrum abx  Added vancomycin    ENDOCRINE   Hyperglycemia  - SSI for now, but will likely need to add long-acting given increase in steroids  - currently at goal 140-180      DVT Prophylaxis: SCD, enoxaparin  U - Ulcer Prophylaxis: PPI  G - Glycemic Control: Insulin  B - Bowel Regimen: miralax  Tubes: ETT and Orogastric Tube  Lines: RIJ central line  Drains: Durbin Catheter   Prognosis terminal  Impending death    Unlikely to survive. Subjective:   Progress Note: 2021      Reason for ICU Admission: COVID-19 PNA     HPI: 29 y. o. female, with significant pmhx of asthma, who presents via EMS [facility] inpatient to the ED with report of decompensation over the last 12 hours due to covid PNA.  Over the course of the past 12 hours pt went from NC to Bipap to intubation for refractory hypoxemia.     Upon arrival to ED [facility] ED pt SPO2 in the 70's; however, as per EMS during transport she had to be manually ventilated with ambu bag and they did not have a peep valve. Initial ABG 7.35/44/58/25. Placed on peep of 15 with 100% FiO2. Will be admitted to the ICU. Hospital course  8/13 - admitted to ICU. ECMO consult called, but declined as currently too stable. Vent optimized and did not meet criteria for proning. 8/14 - HEYDI overnight. 8/15 - had done well during the day with PEEP down to 18, Fi down to 60%. Desat overnight with Fi back up to 100%. Pt increasingly restless (on same sedation), followed by self-extubation. Re-intubated but no reserves with prolonged hypoxia in 40s. Now sedated, paralyzed, and on epo.  8/16 - Patient was changed to supine position overnight and she had worsening hypoxemia and now on 100% Fio2. 8/17 - Patient again changed back to supine potion from prone position but had severe hypoxemic events which later improved with recruitment maneuvers. Still paralyzed with nimbex. 8/18. Weaned off of nimbex. Was in prone position overnight. On 100% Fio2 and PEEP of 18cm of water. Peak pressure ~32, pltp is 29.   8/19 - blood from ett and nose  8/21: remains on 100% fio2 , peep 18  No improvement in blood gases   8/22: after supining her , her o2 saturations were in the 30's for more than 1 hour  8/23: spiking high grade temps, slightly hypotensive  8/24: temp spikes have decreased but desaturates to 30's even just turning her.  8/25: Remains on 100% Fio2 and PEEP of 20cm of water. Still gets very hypoxic event on turning. sats now in high 80s. On 4mcg of levophed. Home Medications:     Prior to Admission medications    Medication Sig Start Date End Date Taking? Authorizing Provider   lidocaine (LIDODERM) 5 % 1 Patch by TransDERmal route daily as needed (back pain). Apply patch to the affected area for 12 hours a day and remove for 12 hours a day. Provider, Historical   ProAir HFA 90 mcg/actuation inhaler Take 2 Puffs by inhalation every four (4) hours as needed for Wheezing, Shortness of Breath or Cough.  7/30/21   Maddison Flor NP fluticasone propionate (FLOVENT HFA) 110 mcg/actuation inhaler Take 1 Puff by inhalation every twelve (12) hours. 7/30/21   Giovany Moore NP   montelukast (SINGULAIR) 10 mg tablet Take 1 Tablet by mouth daily. 7/30/21   Giovany Moore NP   famotidine (PEPCID) 20 mg tablet Take 1 Tablet by mouth two (2) times a day. 7/30/21   Giovany Moore NP   albuterol (PROVENTIL VENTOLIN) 2.5 mg /3 mL (0.083 %) nebu Take 3 mL by inhalation every four (4) hours as needed for Wheezing, Shortness of Breath or Cough.  6/7/21   Giovany Moore NP       Current Meds:     Current Facility-Administered Medications   Medication Dose Route Frequency    dexmedeTOMidine in 0.9 % NaCl (PRECEDEX) 400 mcg/100 mL (4 mcg/mL) infusion soln  0.1-1.5 mcg/kg/hr IntraVENous TITRATE    enoxaparin (LOVENOX) injection 40 mg  40 mg SubCUTAneous Q12H    dexamethasone (DECADRON) 4 mg/mL injection 9 mg  9 mg IntraVENous Q24H    epoprostenol (VELETRI) 30 mcg/mL in 0.9% sodium chloride 50 mL inhalation solution  10 ng/kg/min (Ideal) Inhalation CONTINUOUS    insulin glargine (LANTUS) injection 8 Units  8 Units SubCUTAneous Q24H    vancomycin (VANCOCIN) 1500 mg in  ml infusion  1,500 mg IntraVENous Q12H    NOREPINephrine (LEVOPHED) 8 mg in 5% dextrose 250mL (32 mcg/mL) infusion  0.5-16 mcg/min IntraVENous TITRATE    cefepime (MAXIPIME) 2 g in 0.9% sodium chloride (MBP/ADV) 100 mL MBP  2 g IntraVENous Q8H    metroNIDAZOLE (FLAGYL) IVPB premix 500 mg  500 mg IntraVENous Q12H    fluconazole (DIFLUCAN) 400mg/200 mL IVPB (premix)  400 mg IntraVENous Q24H    alcohol 62% (NOZIN) nasal  1 Ampule  1 Ampule Topical Q12H    white petrolatum-mineral oiL (SOOTHE NIGHT TIME) 80-20 % ophthalmic ointment   Both Eyes Q12H    senna-docusate (PERICOLACE) 8.6-50 mg per tablet 1 Tablet  1 Tablet Per G Tube Q12H    balsam peru-castor oiL (VENELEX) ointment   Topical BID    clonazePAM (KlonoPIN) tablet 2 mg  2 mg Per G Tube TID    oxyCODONE (ROXICODONE) 5 mg/5 mL oral solution 30 mg  30 mg Per G Tube Q8H    LORazepam (ATIVAN) injection 4 mg  4 mg IntraVENous Q4H PRN    midazolam (VERSED) 100 mg in 0.9% sodium chloride 100 mL infusion  0-15 mg/hr IntraVENous TITRATE    chlorhexidine (ORAL CARE KIT) 0.12 % mouthwash 15 mL  15 mL Oral Q12H    fentaNYL (PF) 1,500 mcg/30 mL (50 mcg/mL) infusion  0-300 mcg/hr IntraVENous TITRATE    sodium chloride (NS) flush 5-40 mL  5-40 mL IntraVENous Q8H    sodium chloride (NS) flush 5-40 mL  5-40 mL IntraVENous PRN    sodium chloride (NS) flush 5-40 mL  5-40 mL IntraVENous Q8H    sodium chloride (NS) flush 5-40 mL  5-40 mL IntraVENous PRN    acetaminophen (TYLENOL) suppository 650 mg  650 mg Rectal Q6H PRN    polyethylene glycol (MIRALAX) packet 17 g  17 g Oral DAILY PRN    ondansetron (ZOFRAN ODT) tablet 4 mg  4 mg Oral Q8H PRN    Or    ondansetron (ZOFRAN) injection 4 mg  4 mg IntraVENous Q6H PRN    acetaminophen (TYLENOL) solution 650 mg  650 mg Oral Q4H PRN    albuterol-ipratropium (DUO-NEB) 2.5 MG-0.5 MG/3 ML  3 mL Nebulization Q4H PRN    pantoprazole (PROTONIX) 40 mg in 0.9% sodium chloride 10 mL injection  40 mg IntraVENous DAILY    budesonide (PULMICORT) 250 mcg/2ml nebulizer susp  250 mcg Nebulization BID RT    glucose chewable tablet 16 g  4 Tablet Oral PRN    dextrose (D50W) injection syrg 12.5-25 g  25-50 mL IntraVENous PRN    glucagon (GLUCAGEN) injection 1 mg  1 mg IntraMUSCular PRN    insulin lispro (HUMALOG) injection   SubCUTAneous Q6H       Objective:   Vital Signs:  Visit Vitals  /60   Pulse 67   Temp 99.3 °F (37.4 °C)   Resp 30   Ht 5' 5\" (1.651 m)   Wt 151.9 kg (334 lb 14.1 oz)   SpO2 92%   BMI 55.73 kg/m²    O2 Flow Rate (L/min): 5 l/min O2 Device: Ventilator Temp (24hrs), Av.7 °F (37.1 °C), Min:97.9 °F (36.6 °C), Max:99.3 °F (37.4 °C)           Intake/Output:     Intake/Output Summary (Last 24 hours) at 2021 0937  Last data filed at 2021 0544  Gross per 24 hour   Intake 2378.47 ml   Output 1660 ml   Net 718.47 ml       Physical Exam:  Supine, intubated, sedated,   ETT in place, RIJ CVC  Resps even and unlabored, symmetric chest rise on MV  Reg rate, no heave/rub  Peripheral pulses intact  Abd soft, obese, nontender  Skin warm, dry    LABS AND  DATA: Personally reviewed  Recent Labs     08/25/21 0155 08/24/21 0427   WBC 10.0 8.6   HGB 10.5* 11.6   HCT 36.1 39.6    143*     Recent Labs     08/25/21 0155 08/24/21 0427   * 144   K 3.9 4.2   * 110*   CO2 27 32   BUN 36* 29*   CREA 0.92 0.89   * 199*   CA 8.2* 8.7   MG 2.3 2.3   PHOS 3.7 3.5     Recent Labs     08/25/21 0155 08/24/21 0427   AP 46 54   TP 6.4 7.3   ALB 2.7* 3.1*   GLOB 3.7 4.2*   LPSE  --  2,740*     No results for input(s): INR, PTP, APTT, INREXT, INREXT in the last 72 hours. No results for input(s): PHI, PCO2I, PO2I, FIO2I in the last 72 hours. Recent Labs     08/25/21 0155 08/24/21 0427   CPK 1,723* 2,504*         Ventilator Settings:  Mode Rate Tidal Volume Pressure FiO2 PEEP   Pressure control   270 ml    100 % 20 cm H20     Peak airway pressure: 40 cm H2O    Minute ventilation: 10.8 l/min      Multidisciplinary Rounds Completed:  No    ABCDEF Bundle/Checklist Completed:  Yes    SPECIAL EQUIPMENT  ventilator    DISPOSITION  Stay in ICU    CRITICAL CARE CONSULTANT NOTE  I had a face to face encounter with the patient, reviewed and interpreted patient data including clinical events, labs, images, vital signs, I/O's, and examined patient. I have discussed the case and the plan and management of the patient's care with the consulting services, the bedside nurses and the respiratory therapist.      NOTE OF PERSONAL INVOLVEMENT IN CARE   This patient has a high probability of imminent, clinically significant deterioration, which requires the highest level of preparedness to intervene urgently.  I participated in the decision-making and personally managed or directed the management of the following life and organ supporting interventions that required my frequent assessment to treat or prevent imminent deterioration. I personally spent 40 minutes of critical care time. This is time spent at this critically ill patient's bedside actively involved in patient care as well as the coordination of care. This does not include any procedural time which has been billed separately.     David Swenson MD  [facility] Critical Care    8/25/2021

## 2021-08-26 NOTE — INTERDISCIPLINARY ROUNDS
Critical care interdisciplinary rounds held on 08/26/2021. Following members present, Pharmacy, Diabetes Treatment, Case Management, Respiratory Therapy, Physical Therapy, Clinical Lead and Nutrition. Led by Micha Lima RN and Dr. Sandra Schulz. Plan of care discussed. See clinical pathway for plan of care and interventions and desired outcomes.

## 2021-08-26 NOTE — PROGRESS NOTES
0700: recvd report and assumed care of pt    0800: assessment as noted. Remains on levo gtt, versed, fentanyl, and maximum vent support. Eyes are open, no tracking or following commands. 1030: Dr Hung Amaral from [facility] (ICU) called at the request of pt's mother who is being cared for by Dr Hung Amaral. Mom requests Dr Hung Amaral to get information from me so she can explain it to pt mom. I updated Dr Hung Amaral per the okay from pt mom. Explained to Dr Hung Amaral pt poor prognosis, 100% FiO2 requirement on vent w/PEEP 20, likelihood of anoxia due to many days of being hypoxic, etc.  Dr Hung Amaral will have a sit down conversation with pt mom today to explain in detail poor prognosis in hopes of facilitating comfort for the patient. 1100: pt sister was at the bedside for visit    1200: reassessment unchanged    1500: [facility] nurse who is caring for pt's mom called me to see if mom could facetime my patient today. Explained we can do zoom meeting. Invitation sent via email for 4pm today.     1800: no changes to report

## 2021-08-26 NOTE — PROGRESS NOTES
1900: SBAR report received. Pt. Care assumed at this time. 2000: upon assessment of patient noted pt. Oral temp. 101. Medicated with prns. See EMAR. Oral care and suctioning performed. No signs of acute distress noted at this time. Vital signs within parameters. 2100: upon re-assessment of pt. Noted no change in fevers. Cooling blanket placed back underneath pt. At this time. Incontinence care performed at this time along with three RNs to assist during turning. Pt. Does not tolerate turning well. Fighting ventilator and major coughing fit. After returning to supine position pt. No longer fighting ventilator and RR 28. Vital signs within parameters. 0000: re-assessment completed at this time. titrating precedex gtt down due to bradycardia and pt. Calm and tolerating ventilator at this time. 0100: continuing to titrate down precedex gtt. Pt. Remains bradycardiac. 0220: pt. HR dropped 35-38 however pt. Not maintaining in the 30s. precedex gtt off at this time. Remaining  Vital signs within parameters. AM labs drawn at this time. 0350: Pt. Temperature 97.7 from skin probe. Cooling blanket turned off at this time. Vital signs remain in parameters however, HR continues to be bradycardiac low 40s. Pt. Did not tolerate having levophed gtt back off.  Restarted at [facility] and MAPs remain at goal.

## 2021-08-26 NOTE — PROGRESS NOTES
[facility]    ICU TEAM Progress Note    Name: Meir Pederson   : 1985   MRN: [id number]   Date: 2021           ICU Assessment & Plan of Care       Meir Pederson Is a 29 y.o. female with asthma who was  admitted for COVID-19 PNA. NEURO  Pain, agitation, sedation  -fent, versed,, ketamine  gtts for deep sedation, gets very hypoxic on slight movements. CARDIAC  Suspect septic shock at this time   On low-dose vasopressors. Goal map of more than 65. RESPIRATORY  COVID-19 PNA   cont LPV with goal 6cc/kg IBW, plat < 30, DP<15, pH < 7.2, pO2 60-90  - currently ACPC, 285 (~4cc/kg due to plats) / 30 / 14 / 100% PEEP 18  - s/p toci  - taper off and stop dexamethasone  No more proning    RENAL  Rhabdomyolysis  - D/c'd paralytic on     GASTROINTESTINAL  Acute pancreatitis. Supportive care. Obtain right upper quadrant ultrasound, ideally she needs CT abdomen but she is clinically unstable for CT.  tube feedings    HEMATOLOGIC  Started on full dose lovenox   - standard transfusion triggers    ID  Fever : suspect has sinusitis  Purulent foul smelling material from nostril : will add diflucan and broad spectrum abx  Added vancomycin    ENDOCRINE   Hyperglycemia  - SSI for now, but will likely need to add long-acting given increase in steroids  - currently at goal 140-180      DVT Prophylaxis: SCD, enoxaparin  U - Ulcer Prophylaxis: PPI  G - Glycemic Control: Insulin  B - Bowel Regimen: miralax  Tubes: ETT and Orogastric Tube  Lines: RIJ central line  Drains: Durbin Catheter   Prognosis terminal  Impending death    Unlikely to survive. Subjective:   Progress Note: 2021      Reason for ICU Admission: COVID-19 PNA     HPI: 29 y. o. female, with significant pmhx of asthma, who presents via EMS [facility] inpatient to the ED with report of decompensation over the last 12 hours due to covid PNA.  Over the course of the past 12 hours pt went from NC to Bipap to intubation for refractory hypoxemia.     Upon arrival to ED [facility] ED pt SPO2 in the 70's; however, as per EMS during transport she had to be manually ventilated with ambu bag and they did not have a peep valve. Initial ABG 7.35/44/58/25. Placed on peep of 15 with 100% FiO2. Will be admitted to the ICU. Hospital course  8/13 - admitted to ICU. ECMO consult called, but declined as currently too stable. Vent optimized and did not meet criteria for proning. 8/14 - HEYDI overnight. 8/15 - had done well during the day with PEEP down to 18, Fi down to 60%. Desat overnight with Fi back up to 100%. Pt increasingly restless (on same sedation), followed by self-extubation. Re-intubated but no reserves with prolonged hypoxia in 40s. Now sedated, paralyzed, and on epo.  8/16 - Patient was changed to supine position overnight and she had worsening hypoxemia and now on 100% Fio2. 8/17 - Patient again changed back to supine potion from prone position but had severe hypoxemic events which later improved with recruitment maneuvers. Still paralyzed with nimbex. 8/18. Weaned off of nimbex. Was in prone position overnight. On 100% Fio2 and PEEP of 18cm of water. Peak pressure ~32, pltp is 29.   8/19 - blood from ett and nose  8/21: remains on 100% fio2 , peep 18  No improvement in blood gases   8/22: after supining her , her o2 saturations were in the 30's for more than 1 hour  8/23: spiking high grade temps, slightly hypotensive  8/24: temp spikes have decreased but desaturates to 30's even just turning her.  8/25: Remains on 100% Fio2 and PEEP of 20cm of water. Still gets very hypoxic event on turning. sats now in high 80s. On 4mcg of levophed. 8/26: Remains on 100% Fio2 and PEEP of 20cm of water. Still gets very hypoxic event on turning. On 2mcg of levophed. Spiking temps up to 101F. White count stable. reculture. Home Medications:     Prior to Admission medications    Medication Sig Start Date End Date Taking?  Authorizing Provider   lidocaine (LIDODERM) 5 % 1 Patch by TransDERmal route daily as needed (back pain). Apply patch to the affected area for 12 hours a day and remove for 12 hours a day. Provider, Sasha   ProAir HFA 90 mcg/actuation inhaler Take 2 Puffs by inhalation every four (4) hours as needed for Wheezing, Shortness of Breath or Cough. 7/30/21   Jo Ann Boyer NP   fluticasone propionate (FLOVENT HFA) 110 mcg/actuation inhaler Take 1 Puff by inhalation every twelve (12) hours. 7/30/21   Jo Ann Boyer NP   montelukast (SINGULAIR) 10 mg tablet Take 1 Tablet by mouth daily. 7/30/21   Jo Ann Boyer NP   famotidine (PEPCID) 20 mg tablet Take 1 Tablet by mouth two (2) times a day. 7/30/21   Jo Ann Boyer NP   albuterol (PROVENTIL VENTOLIN) 2.5 mg /3 mL (0.083 %) nebu Take 3 mL by inhalation every four (4) hours as needed for Wheezing, Shortness of Breath or Cough.  6/7/21   Jo Ann Boyer NP       Current Meds:     Current Facility-Administered Medications   Medication Dose Route Frequency    dexmedeTOMidine in 0.9 % NaCl (PRECEDEX) 400 mcg/100 mL (4 mcg/mL) infusion soln  0.1-1.5 mcg/kg/hr IntraVENous TITRATE    enoxaparin (LOVENOX) injection 40 mg  40 mg SubCUTAneous Q12H    insulin glargine (LANTUS) injection 15 Units  15 Units SubCUTAneous Q24H    white petrolatum-mineral oiL (SOOTHE NIGHT TIME) 80-20 % ophthalmic ointment   Both Eyes PRN    dexamethasone (DECADRON) 4 mg/mL injection 9 mg  9 mg IntraVENous Q24H    vancomycin (VANCOCIN) 1500 mg in  ml infusion  1,500 mg IntraVENous Q12H    NOREPINephrine (LEVOPHED) 8 mg in 5% dextrose 250mL (32 mcg/mL) infusion  0.5-16 mcg/min IntraVENous TITRATE    cefepime (MAXIPIME) 2 g in 0.9% sodium chloride (MBP/ADV) 100 mL MBP  2 g IntraVENous Q8H    metroNIDAZOLE (FLAGYL) IVPB premix 500 mg  500 mg IntraVENous Q12H    fluconazole (DIFLUCAN) 400mg/200 mL IVPB (premix)  400 mg IntraVENous Q24H    alcohol 62% (NOZIN) nasal  1 Ampule  1 Ampule Topical Q12H    senna-docusate (PERICOLACE) 8.6-50 mg per tablet 1 Tablet  1 Tablet Per G Tube Q12H    balsam peru-castor oiL (VENELEX) ointment   Topical BID    clonazePAM (KlonoPIN) tablet 2 mg  2 mg Per G Tube TID    oxyCODONE (ROXICODONE) 5 mg/5 mL oral solution 30 mg  30 mg Per G Tube Q8H    LORazepam (ATIVAN) injection 4 mg  4 mg IntraVENous Q4H PRN    midazolam (VERSED) 100 mg in 0.9% sodium chloride 100 mL infusion  0-15 mg/hr IntraVENous TITRATE    chlorhexidine (ORAL CARE KIT) 0.12 % mouthwash 15 mL  15 mL Oral Q12H    fentaNYL (PF) 1,500 mcg/30 mL (50 mcg/mL) infusion  0-300 mcg/hr IntraVENous TITRATE    sodium chloride (NS) flush 5-40 mL  5-40 mL IntraVENous Q8H    sodium chloride (NS) flush 5-40 mL  5-40 mL IntraVENous PRN    sodium chloride (NS) flush 5-40 mL  5-40 mL IntraVENous Q8H    sodium chloride (NS) flush 5-40 mL  5-40 mL IntraVENous PRN    acetaminophen (TYLENOL) suppository 650 mg  650 mg Rectal Q6H PRN    polyethylene glycol (MIRALAX) packet 17 g  17 g Oral DAILY PRN    ondansetron (ZOFRAN ODT) tablet 4 mg  4 mg Oral Q8H PRN    Or    ondansetron (ZOFRAN) injection 4 mg  4 mg IntraVENous Q6H PRN    acetaminophen (TYLENOL) solution 650 mg  650 mg Oral Q4H PRN    albuterol-ipratropium (DUO-NEB) 2.5 MG-0.5 MG/3 ML  3 mL Nebulization Q4H PRN    pantoprazole (PROTONIX) 40 mg in 0.9% sodium chloride 10 mL injection  40 mg IntraVENous DAILY    budesonide (PULMICORT) 250 mcg/2ml nebulizer susp  250 mcg Nebulization BID RT    glucose chewable tablet 16 g  4 Tablet Oral PRN    dextrose (D50W) injection syrg 12.5-25 g  25-50 mL IntraVENous PRN    glucagon (GLUCAGEN) injection 1 mg  1 mg IntraMUSCular PRN    insulin lispro (HUMALOG) injection   SubCUTAneous Q6H       Objective:   Vital Signs:  Visit Vitals  BP (!) 122/46   Pulse 61   Temp 97.8 °F (36.6 °C)   Resp 24   Ht 5' 5\" (1.651 m)   Wt 151.9 kg (334 lb 14.1 oz)   SpO2 91%   BMI 55.73 kg/m²    O2 Flow Rate (L/min): 5 l/min O2 Device: Ventilator Temp (24hrs), Av.5 °F (37.5 °C), Min:97.7 °F (36.5 °C), Max:101.1 °F (38.4 °C)           Intake/Output:     Intake/Output Summary (Last 24 hours) at 2021 0857  Last data filed at 2021 0800  Gross per 24 hour   Intake 2573.81 ml   Output 2625 ml   Net -51.19 ml       Physical Exam:  Supine, intubated, sedated,   ETT in place, RIJ CVC  Resps even and unlabored, symmetric chest rise on MV  Reg rate, no heave/rub  Peripheral pulses intact  Abd soft, obese, nontender  Skin warm, dry    LABS AND  DATA: Personally reviewed  Recent Labs     21   WBC 9.5 10.0   HGB 10.1* 10.5*   HCT 34.0* 36.1    156     Recent Labs     21   * 147*   K 4.1 3.9   * 116*   CO2 27 27   BUN 36* 36*   CREA 0.99 0.92   * 146*   CA 8.4* 8.2*   MG 2.5* 2.3   PHOS 3.0 3.7     Recent Labs     21   AP 40* 46   < > 54   TP 6.3* 6.4   < > 7.3   ALB 2.6* 2.7*   < > 3.1*   GLOB 3.7 3.7   < > 4.2*   LPSE  --   --   --  2,740*    < > = values in this interval not displayed. No results for input(s): INR, PTP, APTT, INREXT, INREXT in the last 72 hours. No results for input(s): PHI, PCO2I, PO2I, FIO2I in the last 72 hours. Recent Labs     21   * 1,723*         Ventilator Settings:  Mode Rate Tidal Volume Pressure FiO2 PEEP   Pressure control   270 ml    100 % 20 cm H20     Peak airway pressure: 38 cm H2O    Minute ventilation: 9.66 l/min      Multidisciplinary Rounds Completed:  No    ABCDEF Bundle/Checklist Completed:  Yes    SPECIAL EQUIPMENT  ventilator    DISPOSITION  Stay in ICU    CRITICAL CARE CONSULTANT NOTE  I had a face to face encounter with the patient, reviewed and interpreted patient data including clinical events, labs, images, vital signs, I/O's, and examined patient.   I have discussed the case and the plan and management of the patient's care with the consulting services, the bedside nurses and the respiratory therapist.      NOTE OF PERSONAL INVOLVEMENT IN CARE   This patient has a high probability of imminent, clinically significant deterioration, which requires the highest level of preparedness to intervene urgently. I participated in the decision-making and personally managed or directed the management of the following life and organ supporting interventions that required my frequent assessment to treat or prevent imminent deterioration. I personally spent 40 minutes of critical care time. This is time spent at this critically ill patient's bedside actively involved in patient care as well as the coordination of care. This does not include any procedural time which has been billed separately.     Marlin Trinh MD  [facility] Critical [facility]    8/26/2021

## 2021-08-26 NOTE — PROGRESS NOTES
Comprehensive Nutrition Assessment    Type and Reason for Visit: Reassess    Nutrition Recommendations/Plan:   Continue advancing TF (TwoCal) as tolerated. Advance to 20mL/hr today + free water flush 200mL Q4hr per MD  Goal rate is TwoCal at 40mL/hr + Prosource TID    Nutrition Assessment:      Chart reviewed and case discussed during CCU rounds. Pt remains intubated and sedated. Per MD, we restarted trickle TF yesterday which pt has tolerated. Advanced to 20mL/hr today and increased free water flushes d/t Na levels. Pt de-sats with the slightest movements. Her death remains imminent as family is struggling with care decision to withdraw. Estimated Daily Nutrient Needs:  Energy (kcal): 0806-7784 kcals (12-15 kcals/kg); Weight Used for Energy Requirements: Current  Protein (g): 114-143g protein (2.0-2.5g/kg IBW); Weight Used for Protein Requirements: Ideal  Fluid (ml/day): 2000mL; Method Used for Fluid Requirements: 1 ml/kcal      Nutrition Related Findings:  Labs: Na 147, -195, lipase 1991. Meds: cefepime, decadron, diflucan, lantus, humalog, flagyl, oxycodone, miralax, pericolace, vancomycin. Edema: trace edema all extremities. BM 8/18. Wounds:    Pressure injury, Stage II       Current Nutrition Therapies:  DIET NPO  ADULT TUBE FEEDING Orogastric; Standard without Fiber; Delivery Method: Continuous; Continuous Initial Rate (mL/hr): 20; Continuous Advance Tube Feeding: No; Water Flush Volume (mL): 200;  Water Flush Frequency: Q 4 hours    Anthropometric Measures:  · Height:  5' 5\" (165.1 cm)  · Current Body Wt:  151.9 kg (334 lb 14.1 oz)   · Ideal Body Wt:  125 lbs:  267.9 %    · BMI Category:  Obese class 3 (BMI 40.0 or greater)       Nutrition Diagnosis:   · Inadequate protein-energy intake related to impaired respiratory function as evidenced by NPO or clear liquid status due to medical condition, intubation      Nutrition Interventions:   Food and/or Nutrient Delivery: Continue tube feeding  Nutrition Education and Counseling: No recommendations at this time  Coordination of Nutrition Care: Continue to monitor while inpatient, Interdisciplinary rounds    Goals:  Pt will continue advancing TF to goal rate or transition to [address] [facility] [address] next 2-4 days       Nutrition Monitoring and Evaluation:   Behavioral-Environmental Outcomes: None identified  Food/Nutrient Intake Outcomes: Enteral nutrition intake/tolerance  Physical Signs/Symptoms Outcomes: Biochemical data, GI status, Fluid status or edema, Hemodynamic status, Skin, Weight    Discharge Planning:     Too soon to determine     Electronically signed by Mendoza Contreras RD on 8/26/2021 at 2:25 PM    Contact: [facility]

## 2021-08-26 NOTE — PROGRESS NOTES
08/26/21 0507   ABCDEF Bundle   SBT Safety Screen Passed No   SBT Screen Reason for Failure FiO2 > 50%;PEEP > 7.5

## 2021-08-26 NOTE — PROGRESS NOTES
Palliative Medicine Consult  Keith: [phone] ([phone])    Patient Name: Rosetta Guo  YOB: 1985    Date of Initial Consult: 8/17/2021  Reason for Consult: Care Decisions  Requesting Provider: Francia Mcintosh MD  Primary Care Physician: Mindi Morrison NP     SUMMARY:   Rosetta Guo is a 29 y.o. with a past history of asthma and obesity, who was admitted initially to [address] [place] [address] on 8/12 for worsening COVID symptoms after a positive test in the ED the day before. She rapidly decompensated over the next 24 hours and was then electively intubated in preparation for transfer to [address] [place] [address] on  8/13/2021. Once here, she was found to have an SPO2 in the 70's, and had to be manually ventilated with an ambu bag on transfer. She was put on a vent with a peep of 15 and 100% FiO2 and admitted to the ICU. This hospitalization she was evaluated for ECMO but her BMI is quite high, and she was ventilating OK, and was tolerating proning  She continues to have some issues with fighting the vent, and remains paralyzed, but is tolerating proning     Many of her immediate family have active COVID symptoms (12 total), but her mom is hospitalized at [address] [place] [address]. She is aware that her daughter is here, but is hard to understand on the phone. Her sister who would be the next [facility] is also sick, and struggling emotionally. She has also deferred, so next Jillian Bello is patients grandmother. Her grandmother is trying to keep mom in the loop, and has provided her with my phone number to call if she feels she is able to talk. PALLIATIVE DIAGNOSES:   1. Goals of care  2. Shortness of breath  3. COVID-19  4. Anxiety  5. Agitation     PLAN:   1. Spoke with patients grandmother- plan to call mom later today as her physician at [address] [place] [address] is planning on talking to her this morning to share with her face to face an update on how Viona Hodgkin is doing.   I am hoping that this face to face discussion with someone who she trusts will help her to understand better Will situation  2. Mrs Juan Alvarenga (grandmother) shared that she talked to her daughter Gladys John mom) last night to try and reiterate the conversation I had with her, but we both agree that she will come to acceptance in her own time and there is no need to continue trying to make her accept that her daughter is dying  3. Listened as Mrs Juan Alvarenga told me stories of their family, she is trying to stay busy so she doesn't get too sad  4. Will follow up again tomorrow but will need the ICU staff to update them over the weekend  5. Initial consult note routed to primary continuity provider and/or primary health care team members  6. Communicated plan of care with: Palliative IDTAma[facility] Team     GOALS OF CARE / TREATMENT PREFERENCES:     GOALS OF CARE:  Patient/Health Care Proxy Stated Goals: Prolong life    TREATMENT PREFERENCES:   Code Status: DNR    Advance Care Planning:  [x] [facility] Interdisciplinary Team has updated the ACP Navigator with Health Care Decision Maker and Patient Capacity    Primary Decision Maker (Active): Jennifer Brown - Mother - [phone]    Advance Care Planning 8/12/2021   Patient's Healthcare Decision Maker is: Legal Next of Kin   Confirm Advance Directive None   Patient Would Like to Complete Advance Directive No   Does the patient have other document types Other (comment)       Medical Interventions: Other (comment) (full agressive care but no CPR in the event of cardiac death)     Other Instructions: Other:    As far as possible, the palliative care team has discussed with patient / health care proxy about goals of care / treatment preferences for patient.      HISTORY:     History obtained from: chart, family    CHIEF COMPLAINT: none, sedated    HPI/SUBJECTIVE:    The patient is:   [] Verbal and participatory  [x] Non-participatory due to:   condition     Clinical Pain Assessment (nonverbal scale for severity on nonverbal patients):   Clinical Pain Assessment  Severity: 0     Activity (Movement): Lying quietly, normal position    Duration: for how long has pt been experiencing pain (e.g., 2 days, 1 month, years)  Frequency: how often pain is an issue (e.g., several times per day, once every few days, constant)     FUNCTIONAL ASSESSMENT:     Palliative Performance Scale (PPS):  PPS: 20       PSYCHOSOCIAL/SPIRITUAL SCREENING:     Palliative IDT has assessed this patient for cultural preferences / practices and a referral made as appropriate to needs (Cultural Services, Patient Advocacy, Ethics, etc.)    Any spiritual / Mormon concerns:  [] Yes /  [x] No    Caregiver Burnout:  [] Yes /  [x] No /  [] No Caregiver Present      Anticipatory grief assessment:   [x] Normal  / [] Maladaptive       ESAS Anxiety: Anxiety: 0    ESAS Depression: Depression: 0        REVIEW OF SYSTEMS:     Positive and pertinent negative findings in ROS are noted above in HPI. The following systems were [x] reviewed / [] unable to be reviewed as noted in HPI  Other findings are noted below. Systems: constitutional, ears/nose/mouth/throat, respiratory, gastrointestinal, genitourinary, musculoskeletal, integumentary, neurologic, psychiatric, endocrine. Positive findings noted below. Modified ESAS Completed by: provider   Fatigue: 10     Depression: 0 Pain: 0   Anxiety: 0 Nausea: 0     Dyspnea: 0           Stool Occurrence(s): 0        PHYSICAL EXAM:     From RN flowsheet:  Wt Readings from Last 3 Encounters:   08/13/21 334 lb 14.1 oz (151.9 kg)   08/12/21 311 lb (141.1 kg)   07/30/21 316 lb (143.3 kg)     Blood pressure (!) 121/56, pulse (!) 46, temperature 98.5 °F (36.9 °C), resp. rate 26, height 5' 5\" (1.651 m), weight 334 lb 14.1 oz (151.9 kg), SpO2 91 %.     Pain Scale 1: Behavioral Pain Scale (BPS)  Pain Intensity 1: 3              Pain Intervention(s) 1: Medication (see MAR)  Last bowel movement, if known:     Constitutional: proning, appears calm for me  Respiratory: vent       HISTORY:     Active Problems:    Acute hypoxemic respiratory failure due to COVID-19 [facility]) (8/13/2021)      Goals of care, counseling/discussion ()      Shortness of breath ()      Anxiety ()      Agitation ()      History reviewed. No pertinent past medical history. History reviewed. No pertinent surgical history. History reviewed. No pertinent family history. History reviewed, no pertinent family history. Social History     Tobacco Use    Smoking status: Never Smoker    Smokeless tobacco: Never Used   Substance Use Topics    Alcohol use: Yes     Comment: occasionally     Allergies   Allergen Reactions    Lactose Nausea Only    Pcn [Penicillins] Itching     Patient unsure of penicillin allergy.   Tolerates ceftriaxone      Current Facility-Administered Medications   Medication Dose Route Frequency    midazolam (VERSED) 250 mg in 0.9% sodium chloride 250 mL infusion  0-15 mg/hr IntraVENous TITRATE    dexmedeTOMidine in 0.9 % NaCl (PRECEDEX) 400 mcg/100 mL (4 mcg/mL) infusion soln  0.1-1.5 mcg/kg/hr IntraVENous TITRATE    enoxaparin (LOVENOX) injection 40 mg  40 mg SubCUTAneous Q12H    insulin glargine (LANTUS) injection 15 Units  15 Units SubCUTAneous Q24H    white petrolatum-mineral oiL (SOOTHE NIGHT TIME) 80-20 % ophthalmic ointment   Both Eyes PRN    dexamethasone (DECADRON) 4 mg/mL injection 9 mg  9 mg IntraVENous Q24H    vancomycin (VANCOCIN) 1500 mg in  ml infusion  1,500 mg IntraVENous Q12H    NOREPINephrine (LEVOPHED) 8 mg in 5% dextrose 250mL (32 mcg/mL) infusion  0.5-16 mcg/min IntraVENous TITRATE    cefepime (MAXIPIME) 2 g in 0.9% sodium chloride (MBP/ADV) 100 mL MBP  2 g IntraVENous Q8H    metroNIDAZOLE (FLAGYL) IVPB premix 500 mg  500 mg IntraVENous Q12H    fluconazole (DIFLUCAN) 400mg/200 mL IVPB (premix)  400 mg IntraVENous Q24H    alcohol 62% (NOZIN) nasal  1 Ampule  1 Ampule Topical Q12H    senna-docusate (PERICOLACE) 8.6-50 mg per tablet 1 Tablet  1 Tablet Per G Tube Q12H    balsam peru-castor oiL (VENELEX) ointment   Topical BID    clonazePAM (KlonoPIN) tablet 2 mg  2 mg Per G Tube TID    oxyCODONE (ROXICODONE) 5 mg/5 mL oral solution 30 mg  30 mg Per G Tube Q8H    LORazepam (ATIVAN) injection 4 mg  4 mg IntraVENous Q4H PRN    midazolam (VERSED) 100 mg in 0.9% sodium chloride 100 mL infusion  0-15 mg/hr IntraVENous TITRATE    chlorhexidine (ORAL CARE KIT) 0.12 % mouthwash 15 mL  15 mL Oral Q12H    fentaNYL (PF) 1,500 mcg/30 mL (50 mcg/mL) infusion  0-300 mcg/hr IntraVENous TITRATE    sodium chloride (NS) flush 5-40 mL  5-40 mL IntraVENous Q8H    sodium chloride (NS) flush 5-40 mL  5-40 mL IntraVENous PRN    sodium chloride (NS) flush 5-40 mL  5-40 mL IntraVENous Q8H    sodium chloride (NS) flush 5-40 mL  5-40 mL IntraVENous PRN    acetaminophen (TYLENOL) suppository 650 mg  650 mg Rectal Q6H PRN    polyethylene glycol (MIRALAX) packet 17 g  17 g Oral DAILY PRN    ondansetron (ZOFRAN ODT) tablet 4 mg  4 mg Oral Q8H PRN    Or    ondansetron (ZOFRAN) injection 4 mg  4 mg IntraVENous Q6H PRN    acetaminophen (TYLENOL) solution 650 mg  650 mg Oral Q4H PRN    albuterol-ipratropium (DUO-NEB) 2.5 MG-0.5 MG/3 ML  3 mL Nebulization Q4H PRN    pantoprazole (PROTONIX) 40 mg in 0.9% sodium chloride 10 mL injection  40 mg IntraVENous DAILY    budesonide (PULMICORT) 250 mcg/2ml nebulizer susp  250 mcg Nebulization BID RT    glucose chewable tablet 16 g  4 Tablet Oral PRN    dextrose (D50W) injection syrg 12.5-25 g  25-50 mL IntraVENous PRN    glucagon (GLUCAGEN) injection 1 mg  1 mg IntraMUSCular PRN    insulin lispro (HUMALOG) injection   SubCUTAneous Q6H          LAB AND IMAGING FINDINGS:     Lab Results   Component Value Date/Time    WBC 9.5 08/26/2021 02:26 AM    HGB 10.1 (L) 08/26/2021 02:26 AM    PLATELET 891 80/10/0779 02:26 AM     Lab Results   Component Value Date/Time    Sodium 147 (H) 08/26/2021 02:26 AM    Potassium 4.1 08/26/2021 02:26 AM    Chloride 117 (H) 08/26/2021 02:26 AM    CO2 27 08/26/2021 02:26 AM    BUN 36 (H) 08/26/2021 02:26 AM    Creatinine 0.99 08/26/2021 02:26 AM    Calcium 8.4 (L) 08/26/2021 02:26 AM    Magnesium 2.5 (H) 08/26/2021 02:26 AM    Phosphorus 3.0 08/26/2021 02:26 AM      Lab Results   Component Value Date/Time    Alk. phosphatase 40 (L) 08/26/2021 02:26 AM    Protein, total 6.3 (L) 08/26/2021 02:26 AM    Albumin 2.6 (L) 08/26/2021 02:26 AM    Globulin 3.7 08/26/2021 02:26 AM     No results found for: INR, PTMR, PTP, PT1, PT2, APTT, INREXT, INREXT   Lab Results   Component Value Date/Time    Ferritin 1,234 (H) 08/13/2021 05:10 AM      Lab Results   Component Value Date/Time    pH 7.32 (L) 08/26/2021 09:26 AM    PCO2 54 (H) 08/26/2021 09:26 AM    PO2 62 (L) 08/26/2021 09:26 AM     No components found for: [place]   Lab Results   Component Value Date/Time     (H) 08/26/2021 02:26 AM                Total time:   Counseling / coordination time, spent as noted above:   > 50% counseling / coordination?:     Prolonged service was provided for  []30 min   []75 min in face to face time in the presence of the patient, spent as noted above. Time Start:   Time End:   Note: this can only be billed with 14104 (initial) or 18433 (follow up). If multiple start / stop times, list each separately.

## 2021-08-27 NOTE — PROGRESS NOTES
Palliative Medicine Consult  Keith: [phone]    Attempted to call mom and grandma, but had to leave messages  Will follow up again Monday    Jonnathan Power NP

## 2021-08-27 NOTE — INTERDISCIPLINARY ROUNDS
Critical care interdisciplinary rounds held on 08/27/2021. Following members present, Pharmacy, Diabetes Treatment, Case Management, Respiratory Therapy, Physical Therapy and Nutrition. Led by EDITH Kyle RN and Dr. Marilyn Shepard. Plan of care discussed. See clinical pathway for plan of care and interventions and desired outcomes.

## 2021-08-27 NOTE — PROGRESS NOTES
[facility]    ICU TEAM Progress Note    Name: Hilda Carlos   : 1985   MRN: [id number]   Date: 2021           ICU Assessment & Plan of Care       Hilda Carlos Is a 29 y.o. female with asthma who was  admitted for COVID-19 PNA. NEURO  Pain, agitation, sedation  -fent, versed,, ketamine  gtts for deep sedation, gets very hypoxic on slight movements. CARDIAC  Suspect septic shock at this time   On low-dose vasopressors. Goal map of more than 65. RESPIRATORY  COVID-19 PNA   cont LPV with goal 6cc/kg IBW, plat < 30, DP<15, pH < 7.2, pO2 60-90  - currently ACPC, 285 (~4cc/kg due to plats) / 30 / 14 / 100% PEEP 18  - s/p toci  - taper dexamethasone  -No more proning    RENAL  Rhabdomyolysis  - D/c'd paralytic on     GASTROINTESTINAL  Acute pancreatitis. Supportive care. US abd showed findings suggestive of acute cholecystitis. She is also in septic shock. Will consult IR for cholecystostomy tube placement. Also consult general surgery. tube feedings    HEMATOLOGIC  Started on full dose lovenox   - standard transfusion triggers    ID  Fever : suspect has sinusitis  Purulent foul smelling material from nostril : will add diflucan and broad spectrum abx  Added vancomycin    ENDOCRINE   Hyperglycemia  - SSI for now, but will likely need to add long-acting given increase in steroids  - currently at goal 140-180      DVT Prophylaxis: SCD, enoxaparin  U - Ulcer Prophylaxis: PPI  G - Glycemic Control: Insulin  B - Bowel Regimen: miralax  Tubes: ETT and Orogastric Tube  Lines: RIJ central line  Drains: Durbin Catheter   Prognosis terminal  Impending death    Unlikely to survive. Subjective:   Progress Note: 2021      Reason for ICU Admission: COVID-19 PNA     HPI: 29 y. o. female, with significant pmhx of asthma, who presents via EMS [facility] inpatient to the ED with report of decompensation over the last 12 hours due to covid PNA.  Over the course of the past 12 hours pt went from NC to Bipap to intubation for refractory hypoxemia.     Upon arrival to ED [facility] ED pt SPO2 in the 70's; however, as per EMS during transport she had to be manually ventilated with ambu bag and they did not have a peep valve. Initial ABG 7.35/44/58/25. Placed on peep of 15 with 100% FiO2. Will be admitted to the ICU. Hospital course  8/13 - admitted to ICU. ECMO consult called, but declined as currently too stable. Vent optimized and did not meet criteria for proning. 8/14 - HEYDI overnight. 8/15 - had done well during the day with PEEP down to 18, Fi down to 60%. Desat overnight with Fi back up to 100%. Pt increasingly restless (on same sedation), followed by self-extubation. Re-intubated but no reserves with prolonged hypoxia in 40s. Now sedated, paralyzed, and on epo.  8/16 - Patient was changed to supine position overnight and she had worsening hypoxemia and now on 100% Fio2. 8/17 - Patient again changed back to supine potion from prone position but had severe hypoxemic events which later improved with recruitment maneuvers. Still paralyzed with nimbex. 8/18. Weaned off of nimbex. Was in prone position overnight. On 100% Fio2 and PEEP of 18cm of water. Peak pressure ~32, pltp is 29.   8/19 - blood from ett and nose  8/21: remains on 100% fio2 , peep 18  No improvement in blood gases   8/22: after supining her , her o2 saturations were in the 30's for more than 1 hour  8/23: spiking high grade temps, slightly hypotensive  8/24: temp spikes have decreased but desaturates to 30's even just turning her.  8/25: Remains on 100% Fio2 and PEEP of 20cm of water. Still gets very hypoxic event on turning. sats now in high 80s. On 4mcg of levophed. 8/26: Remains on 100% Fio2 and PEEP of 20cm of water. Still gets very hypoxic event on turning. On 2mcg of levophed. Spiking temps up to 101F. White count stable. Reculture. 8/27: 8/27: Remains on 100% Fio2 and PEEP of 20cm of water.  Still gets very hypoxic event on turning. On 4mcg of levophed. Temps improving. Home Medications:     Prior to Admission medications    Medication Sig Start Date End Date Taking? Authorizing Provider   lidocaine (LIDODERM) 5 % 1 Patch by TransDERmal route daily as needed (back pain). Apply patch to the affected area for 12 hours a day and remove for 12 hours a day. Provider, Sasha   ProAir HFA 90 mcg/actuation inhaler Take 2 Puffs by inhalation every four (4) hours as needed for Wheezing, Shortness of Breath or Cough. 7/30/21   Algis Market, NP   fluticasone propionate (FLOVENT HFA) 110 mcg/actuation inhaler Take 1 Puff by inhalation every twelve (12) hours. 7/30/21   Algis Market, NP   montelukast (SINGULAIR) 10 mg tablet Take 1 Tablet by mouth daily. 7/30/21   Algis Market, NP   famotidine (PEPCID) 20 mg tablet Take 1 Tablet by mouth two (2) times a day. 7/30/21   AlgMpax Market, NP   albuterol (PROVENTIL VENTOLIN) 2.5 mg /3 mL (0.083 %) nebu Take 3 mL by inhalation every four (4) hours as needed for Wheezing, Shortness of Breath or Cough.  6/7/21   AlgMpax Market, NP       Current Meds:     Current Facility-Administered Medications   Medication Dose Route Frequency    polyethylene glycol (MIRALAX) packet 17 g  17 g Per G Tube DAILY    midazolam (VERSED) 250 mg in 0.9% sodium chloride 250 mL infusion  0-15 mg/hr IntraVENous TITRATE    dexmedeTOMidine in 0.9 % NaCl (PRECEDEX) 400 mcg/100 mL (4 mcg/mL) infusion soln  0.1-1.5 mcg/kg/hr IntraVENous TITRATE    enoxaparin (LOVENOX) injection 40 mg  40 mg SubCUTAneous Q12H    insulin glargine (LANTUS) injection 15 Units  15 Units SubCUTAneous Q24H    white petrolatum-mineral oiL (SOOTHE NIGHT TIME) 80-20 % ophthalmic ointment   Both Eyes PRN    dexamethasone (DECADRON) 4 mg/mL injection 9 mg  9 mg IntraVENous Q24H    vancomycin (VANCOCIN) 1500 mg in  ml infusion  1,500 mg IntraVENous Q12H    NOREPINephrine (LEVOPHED) 8 mg in 5% dextrose 250mL (32 mcg/mL) infusion  0.5-16 mcg/min IntraVENous TITRATE    cefepime (MAXIPIME) 2 g in 0.9% sodium chloride (MBP/ADV) 100 mL MBP  2 g IntraVENous Q8H    metroNIDAZOLE (FLAGYL) IVPB premix 500 mg  500 mg IntraVENous Q12H    fluconazole (DIFLUCAN) 400mg/200 mL IVPB (premix)  400 mg IntraVENous Q24H    alcohol 62% (NOZIN) nasal  1 Ampule  1 Ampule Topical Q12H    senna-docusate (PERICOLACE) 8.6-50 mg per tablet 1 Tablet  1 Tablet Per G Tube Q12H    balsam peru-castor oiL (VENELEX) ointment   Topical BID    clonazePAM (KlonoPIN) tablet 2 mg  2 mg Per G Tube TID    oxyCODONE (ROXICODONE) 5 mg/5 mL oral solution 30 mg  30 mg Per G Tube Q8H    LORazepam (ATIVAN) injection 4 mg  4 mg IntraVENous Q4H PRN    chlorhexidine (ORAL CARE KIT) 0.12 % mouthwash 15 mL  15 mL Oral Q12H    fentaNYL (PF) 1,500 mcg/30 mL (50 mcg/mL) infusion  0-300 mcg/hr IntraVENous TITRATE    sodium chloride (NS) flush 5-40 mL  5-40 mL IntraVENous Q8H    sodium chloride (NS) flush 5-40 mL  5-40 mL IntraVENous PRN    sodium chloride (NS) flush 5-40 mL  5-40 mL IntraVENous Q8H    sodium chloride (NS) flush 5-40 mL  5-40 mL IntraVENous PRN    acetaminophen (TYLENOL) suppository 650 mg  650 mg Rectal Q6H PRN    ondansetron (ZOFRAN ODT) tablet 4 mg  4 mg Oral Q8H PRN    Or    ondansetron (ZOFRAN) injection 4 mg  4 mg IntraVENous Q6H PRN    acetaminophen (TYLENOL) solution 650 mg  650 mg Oral Q4H PRN    albuterol-ipratropium (DUO-NEB) 2.5 MG-0.5 MG/3 ML  3 mL Nebulization Q4H PRN    pantoprazole (PROTONIX) 40 mg in 0.9% sodium chloride 10 mL injection  40 mg IntraVENous DAILY    budesonide (PULMICORT) 250 mcg/2ml nebulizer susp  250 mcg Nebulization BID RT    glucose chewable tablet 16 g  4 Tablet Oral PRN    dextrose (D50W) injection syrg 12.5-25 g  25-50 mL IntraVENous PRN    glucagon (GLUCAGEN) injection 1 mg  1 mg IntraMUSCular PRN    insulin lispro (HUMALOG) injection   SubCUTAneous Q6H       Objective:   Vital Signs: Visit Vitals  BP (!) 111/43   Pulse (!) 46   Temp 98.8 °F (37.1 °C)   Resp 14   Ht 5' 5\" (1.651 m)   Wt 151.9 kg (334 lb 14.1 oz)   SpO2 91%   BMI 55.73 kg/m²    O2 Flow Rate (L/min): 5 l/min O2 Device: Endotracheal tube, Ventilator Temp (24hrs), Av.6 °F (37 °C), Min:98 °F (36.7 °C), Max:99.1 °F (37.3 °C)           Intake/Output:     Intake/Output Summary (Last 24 hours) at 2021 1340  Last data filed at 2021 1300  Gross per 24 hour   Intake 4076.2 ml   Output 1575 ml   Net 2501.2 ml       Physical Exam:  Supine, intubated, sedated,   ETT in place, RIJ CVC  Resps even and unlabored, symmetric chest rise on MV  Reg rate, no heave/rub  Peripheral pulses intact  Abd soft, obese, nontender  Skin warm, dry    LABS AND  DATA: Personally reviewed  Recent Labs     21   WBC 12.4* 9.5   HGB 9.4* 10.1*   HCT 31.7* 34.0*    162     Recent Labs     21   * 147*   K 4.3 4.1   * 117*   CO2 29 27   BUN 30* 36*   CREA 0.85 0.99   * 195*   CA 8.6 8.4*   MG 2.6* 2.5*   PHOS 3.0 3.0     Recent Labs     21   AP 44* 40*   TP 6.4 6.3*   ALB 3.0* 2.6*   GLOB 3.4 3.7   LPSE  --  1,991*     No results for input(s): INR, PTP, APTT, INREXT, INREXT in the last 72 hours. No results for input(s): PHI, PCO2I, PO2I, FIO2I in the last 72 hours.   Recent Labs     21   * 980*         Ventilator Settings:  Mode Rate Tidal Volume Pressure FiO2 PEEP   Pressure control   270 ml    100 % 20 cm H20     Peak airway pressure: 39 cm H2O    Minute ventilation: 9 l/min      Multidisciplinary Rounds Completed:  No    ABCDEF Bundle/Checklist Completed:  Yes    SPECIAL EQUIPMENT  ventilator    DISPOSITION  Stay in ICU    CRITICAL CARE CONSULTANT NOTE  I had a face to face encounter with the patient, reviewed and interpreted patient data including clinical events, labs, images, vital signs, I/O's, and examined patient. I have discussed the case and the plan and management of the patient's care with the consulting services, the bedside nurses and the respiratory therapist.      NOTE OF PERSONAL INVOLVEMENT IN CARE   This patient has a high probability of imminent, clinically significant deterioration, which requires the highest level of preparedness to intervene urgently. I participated in the decision-making and personally managed or directed the management of the following life and organ supporting interventions that required my frequent assessment to treat or prevent imminent deterioration. I personally spent 40 minutes of critical care time. This is time spent at this critically ill patient's bedside actively involved in patient care as well as the coordination of care. This does not include any procedural time which has been billed separately.     Karine Laird MD  [facility] [place] [facility]    8/27/2021

## 2021-08-27 NOTE — CONSULTS
Consult Date: 8/27/2021    IP CONSULT TO GENERAL SURGERY  Consult performed by: Hipolito Daily MD  Consult ordered by: Yuval Brown MD  Reason for consult: Cholecystitis  Assessment/Recommendations:     Reviewed findings. Gallbladder is not changed significantly from last week. LFTs are normal.  Gallstone pancreatitis cannot be ruled out completely however her pancreatitis is more likely secondary to elevated triglycerides. Based on current findings the patient does not need any further treatment for her gallbladder. Patient unfortunately is critically ill and unlikely to survive. Thank you for allowing me to take part in the care of your patient. I will be available if any additional surgical issues arise. Will sign off. Subjective      Patient currently intubated in the ICU critically ill with COVID-19 pneumonia. Abdominal ultrasound was done today due to rising white blood cell count. I was asked to see the patient due to concern for cholecystitis. White blood cell count is 12.4. LFTs are normal. I reviewed the ultrasound from today and compared it to the ultrasound from last week I reviewed the images in the radiology department as well. The gallbladder appears similar to how it did a week ago. Slightly more distended with may be a little more sludge. Gallbladder wall is 2 mm. There is no pericholecystic fluid. Bile ducts are not remarkable. History reviewed. No pertinent past medical history. History reviewed. No pertinent surgical history. History reviewed. No pertinent family history.    Social History     Tobacco Use    Smoking status: Never Smoker    Smokeless tobacco: Never Used   Substance Use Topics    Alcohol use: Yes     Comment: occasionally       Current Facility-Administered Medications   Medication Dose Route Frequency Provider Last Rate Last Admin    polyethylene glycol (MIRALAX) packet 17 g  17 g Per G Tube DAILY Yuval Brown MD   17 g at 08/27/21 1128    Vancomycin  trough prior to dose due 8/27 at 2100    Other ONCE Leo Valdovinos MD        midazolam (VERSED) 250 mg in 0.9% sodium chloride 250 mL infusion  0-15 mg/hr IntraVENous TITRATE Leo Valdovinos MD 15 mL/hr at 08/27/21 1120 15 mg/hr at 08/27/21 1120    dexmedeTOMidine in 0.9 % NaCl (PRECEDEX) 400 mcg/100 mL (4 mcg/mL) infusion soln  0.1-1.5 mcg/kg/hr IntraVENous TITRATE Faisal Bailey NP   Stopped at 08/27/21 0700    enoxaparin (LOVENOX) injection 40 mg  40 mg SubCUTAneous Q12H Faisal Bailey NP   40 mg at 08/27/21 0851    insulin glargine (LANTUS) injection 15 Units  15 Units SubCUTAneous Q24H Leo Valdovinos MD   15 Units at 08/27/21 1122    white petrolatum-mineral oiL (SOOTHE NIGHT TIME) 80-20 % ophthalmic ointment   Both Eyes PRN Leo Valdovinos MD        dexamethasone (DECADRON) 4 mg/mL injection 9 mg  9 mg IntraVENous Q24H Steph Malloy MD   9 mg at 08/27/21 0848    vancomycin (VANCOCIN) 1500 mg in  ml infusion  1,500 mg IntraVENous Q12H Zohra Webber  mL/hr at 08/27/21 0930 1,500 mg at 08/27/21 0930    NOREPINephrine (LEVOPHED) 8 mg in 5% dextrose 250mL (32 mcg/mL) infusion  0.5-16 mcg/min IntraVENous TITRATE Zohra Webber MD 5.6 mL/hr at 08/27/21 0514 3 mcg/min at 08/27/21 0514    cefepime (MAXIPIME) 2 g in 0.9% sodium chloride (MBP/ADV) 100 mL MBP  2 g IntraVENous Q8H Zohra Webber  mL/hr at 08/27/21 1510 2 g at 08/27/21 1510    metroNIDAZOLE (FLAGYL) IVPB premix 500 mg  500 mg IntraVENous Q12H Zohra Webber  mL/hr at 08/27/21 1512 500 mg at 08/27/21 1512    fluconazole (DIFLUCAN) 400mg/200 mL IVPB (premix)  400 mg IntraVENous Q24H Zohra Webber  mL/hr at 08/27/21 0932 400 mg at 08/27/21 0932    alcohol 62% (NOZIN) nasal  1 Ampule  1 Ampule Topical Q12H Leo Valdovinos MD   1 Ampule at 08/27/21 0825    senna-docusate (PERICOLACE) 8.6-50 mg per tablet 1 Tablet  1 Tablet Per G Tube Q12H Jyothi Shearer MD   1 Tablet at 08/27/21 0848    balsam peru-castor oiL (VENELEX) ointment   Topical BID Jyothi Shearer MD   Given at 08/27/21 0825    clonazePAM (KlonoPIN) tablet 2 mg  2 mg Per G Tube TID Jyothi Shearer MD   2 mg at 08/27/21 1508    oxyCODONE (ROXICODONE) 5 mg/5 mL oral solution 30 mg  30 mg Per G Tube Q8H Jyothi Shearer MD   30 mg at 08/27/21 1355    LORazepam (ATIVAN) injection 4 mg  4 mg IntraVENous Q4H PRN Jyothi Shearer MD   4 mg at 08/25/21 0618    chlorhexidine (ORAL CARE KIT) 0.12 % mouthwash 15 mL  15 mL Oral Q12H Osiris Gorman MD   15 mL at 08/27/21 0846    fentaNYL (PF) 1,500 mcg/30 mL (50 mcg/mL) infusion  0-300 mcg/hr IntraVENous TITRATE Jyothi Shearer MD 6 mL/hr at 08/27/21 1215 300 mcg/hr at 08/27/21 1215    sodium chloride (NS) flush 5-40 mL  5-40 mL IntraVENous Q8H Maryruth Hurl, NP   10 mL at 08/27/21 1356    sodium chloride (NS) flush 5-40 mL  5-40 mL IntraVENous PRN Maryruth Hurl, NP   10 mL at 08/27/21 0855    sodium chloride (NS) flush 5-40 mL  5-40 mL IntraVENous Q8H Maryruth Hurl, NP   10 mL at 08/27/21 1356    sodium chloride (NS) flush 5-40 mL  5-40 mL IntraVENous PRN Maryruth Hurl, NP        acetaminophen (TYLENOL) suppository 650 mg  650 mg Rectal Q6H PRN Maryruth Hurl, NP   650 mg at 08/21/21 2232    ondansetron (ZOFRAN ODT) tablet 4 mg  4 mg Oral Q8H PRN Maryruth Hurl, NP        Or    ondansetron [facility]) injection 4 mg  4 mg IntraVENous Q6H PRN Maryruth Hurl, NP        acetaminophen (TYLENOL) solution 650 mg  650 mg Oral Q4H PRN Maryruth Hurl, NP   650 mg at 08/25/21 2022    albuterol-ipratropium (DUO-NEB) 2.5 MG-0.5 MG/3 ML  3 mL Nebulization Q4H PRN Candace Mccarty NP   3 mL at 08/14/21 2220    pantoprazole (PROTONIX) 40 mg in 0.9% sodium chloride 10 mL injection  40 mg IntraVENous DAILY Candace Mccarty NP   40 mg at 08/27/21 0853    budesonide (PULMICORT) 250 mcg/2ml nebulizer susp  250 mcg Nebulization BID RT Allyson Plunkett MD   250 mcg at 08/27/21 0813    glucose chewable tablet 16 g  4 Tablet Oral PRN Allyson Plunkett MD        dextrose (D50W) injection syrg 12.5-25 g  25-50 mL IntraVENous PRN Elizabeth Gorman MD        glucagon (GLUCAGEN) injection 1 mg  1 mg IntraMUSCular PRN Allyson Plunkett MD        insulin lispro (HUMALOG) injection   SubCUTAneous Q6H Allyson Plunkett MD   3 Units at 08/27/21 1137        Review of Systems   Constitutional: Negative for activity change and unexpected weight change. HENT: Negative for trouble swallowing and voice change. Eyes: Negative for pain. Respiratory: Negative for chest tightness and shortness of breath. Cardiovascular: Negative for chest pain. Gastrointestinal: Negative for abdominal pain and nausea. Genitourinary: Negative for frequency. Musculoskeletal: Negative for back pain and neck pain. Skin: Negative for rash. Neurological: Negative for dizziness and numbness. Hematological: Negative for adenopathy. Psychiatric/Behavioral: Negative for confusion.        Objective     Vital signs for last 24 hours:  Visit Vitals  BP (!) 117/42   Pulse (!) 47   Temp 98.8 °F (37.1 °C)   Resp 28   Ht 5' 5\" (1.651 m)   Wt 151.9 kg (334 lb 14.1 oz)   SpO2 (!) 89%   BMI 55.73 kg/m²       Intake/Output this shift:  Current Shift: 08/27 0701 - 08/27 1900  In: 869 [I.V.:947]  Out: 270 [Urine:270]  Last 3 Shifts: 08/25 1901 - 08/27 0700  In: 5704 [I.V.:5244]  Out: 2680 [Urine:2680]    Data Review:   Recent Results (from the past 24 hour(s))   GLUCOSE, POC    Collection Time: 08/26/21  6:28 PM   Result Value Ref Range    Glucose (POC) 220 (H) 65 - 117 mg/dL    Performed by Stacy Wu RN    GLUCOSE, POC    Collection Time: 08/27/21 12:35 AM   Result Value Ref Range    Glucose (POC) 192 (H) 65 - 117 mg/dL    Performed by Arron Garcia DIFF    Collection Time: 08/27/21  5:12 AM   Result Value Ref Range    WBC 12.4 (H) 3.6 - 11.0 K/uL    RBC 3.40 (L) 3.80 - 5.20 M/uL    HGB 9.4 (L) 11.5 - 16.0 g/dL    HCT 31.7 (L) 35.0 - 47.0 %    MCV 93.2 80.0 - 99.0 FL    MCH 27.6 26.0 - 34.0 PG    MCHC 29.7 (L) 30.0 - 36.5 g/dL    RDW 15.1 (H) 11.5 - 14.5 %    PLATELET 765 536 - 605 K/uL    MPV 12.0 8.9 - 12.9 FL    NRBC 0.0 0  WBC    ABSOLUTE NRBC 0.00 0.00 - 0.01 K/uL    NEUTROPHILS 80 (H) 32 - 75 %    LYMPHOCYTES 9 (L) 12 - 49 %    MONOCYTES 9 5 - 13 %    EOSINOPHILS 0 0 - 7 %    BASOPHILS 0 0 - 1 %    IMMATURE GRANULOCYTES 2 (H) 0.0 - 0.5 %    ABS. NEUTROPHILS 9.9 (H) 1.8 - 8.0 K/UL    ABS. LYMPHOCYTES 1.1 0.8 - 3.5 K/UL    ABS. MONOCYTES 1.1 (H) 0.0 - 1.0 K/UL    ABS. EOSINOPHILS 0.0 0.0 - 0.4 K/UL    ABS. BASOPHILS 0.0 0.0 - 0.1 K/UL    ABS. IMM. GRANS. 0.2 (H) 0.00 - 0.04 K/UL    DF AUTOMATED     METABOLIC PANEL, COMPREHENSIVE    Collection Time: 08/27/21  5:12 AM   Result Value Ref Range    Sodium 148 (H) 136 - 145 mmol/L    Potassium 4.3 3.5 - 5.1 mmol/L    Chloride 116 (H) 97 - 108 mmol/L    CO2 29 21 - 32 mmol/L    Anion gap 3 (L) 5 - 15 mmol/L    Glucose 200 (H) 65 - 100 mg/dL    BUN 30 (H) 6 - 20 MG/DL    Creatinine 0.85 0.55 - 1.02 MG/DL    BUN/Creatinine ratio 35 (H) 12 - 20      GFR est AA >60 >60 ml/min/1.73m2    GFR est non-AA >60 >60 ml/min/1.73m2    Calcium 8.6 8.5 - 10.1 MG/DL    Bilirubin, total 0.7 0.2 - 1.0 MG/DL    ALT (SGPT) 49 12 - 78 U/L    AST (SGOT) 32 15 - 37 U/L    Alk.  phosphatase 44 (L) 45 - 117 U/L    Protein, total 6.4 6.4 - 8.2 g/dL    Albumin 3.0 (L) 3.5 - 5.0 g/dL    Globulin 3.4 2.0 - 4.0 g/dL    A-G Ratio 0.9 (L) 1.1 - 2.2     CK    Collection Time: 08/27/21  5:12 AM   Result Value Ref Range     (H) 26 - 192 U/L   MAGNESIUM    Collection Time: 08/27/21  5:12 AM   Result Value Ref Range    Magnesium 2.6 (H) 1.6 - 2.4 mg/dL   PHOSPHORUS    Collection Time: 08/27/21  5:12 AM   Result Value Ref Range    Phosphorus 3.0 2.6 - 4.7 MG/DL   GLUCOSE, POC    Collection Time: 08/27/21  5:12 AM   Result Value Ref Range    Glucose (POC) 198 (H) 65 - 117 mg/dL    Performed by Vineet Zuniga, POC    Collection Time: 08/27/21 11:37 AM   Result Value Ref Range    Glucose (POC) 204 (H) 65 - 117 mg/dL    Performed by John Quintana        Physical Exam  Constitutional:       Appearance: She is well-developed. She is obese. She is ill-appearing and toxic-appearing. HENT:      Head: Normocephalic and atraumatic. Mouth/Throat:      Pharynx: No oropharyngeal exudate. Eyes:      Pupils: Pupils are equal, round, and reactive to light. Neck:      Trachea: No tracheal deviation. Cardiovascular:      Rate and Rhythm: Normal rate and regular rhythm. Heart sounds: Normal heart sounds. No murmur heard. Pulmonary:      Effort: Pulmonary effort is normal. No respiratory distress. Breath sounds: Normal breath sounds. No wheezing. Abdominal:      General: Abdomen is protuberant. Bowel sounds are normal. There is no distension. Palpations: Abdomen is soft. There is no mass. Tenderness: There is no abdominal tenderness. There is no guarding or rebound. Musculoskeletal:         General: No tenderness. Normal range of motion. Cervical back: Normal range of motion. Lymphadenopathy:      Cervical: No cervical adenopathy. Skin:     General: Skin is warm. Findings: No erythema or rash.

## 2021-08-28 NOTE — PROGRESS NOTES
Pharmacy Automatic Renal Dosing Protocol - Antimicrobials    Indication for Antimicrobials: sepsis unknown etiology, possible sinusitis, possible cholecystitis      Current Regimen of Each Antimicrobial:   Fluconazole 400 mg IV q24h; started ; day 7  Vancomycin 1500 mg IV q12h; started ; day 6  Cefepime 2g IV q8h; start , day 6  Metronidazole 500 mg IV q12h; started ; day 6    Previous Antimicrobial Therapy:   Levofloxacin 750 mg IV q24; started -    Goal Level: VANCOMYCIN TROUGH GOAL RANGE  Vancomycin Trough: 15 - 20 mcg/mL  (AUC: 400 - 600 mg/hr/Liter/day)     Date Dose & Interval Measured (mcg/mL) Extrapolated (mcg/mL)    19:43 1500 mg q12h 14.1 13.6   .7    @ 2049 1500 mg every 12 hours 18.8 20.1 (613)           Significant Cultures:    blood cx NG final   paired blood cx:  bottles CoNS, pending      Paralysis, amputations, malnutrition: n/a    Labs:  Recent Labs     21  0618 21  0512 21  0226   CREA 0.98 0.85 0.99   BUN 28* 30* 36*   WBC 16.6* 12.4* 9.5     Temp (24hrs), Av.2 °F (36.8 °C), Min:97.8 °F (36.6 °C), Max:98.8 °F (37.1 °C)    Creatinine Clearance (mL/min) or Dialysis: 72.1 mL/min (IBW)    Impression/Plan:   Scr stable  Vancomycin levels extrapolated to be supratherapeutic at 20.1 mcg/mL and . Will change to 1250 mg IV every 12 hours for an estimated trough of 16.9 mcg/mL and AUC of 521. Will continue current regimen for fluconazole, cefepime, and metronidazole as appropriate for indication and renal function. Antimicrobial stop date pending     Pharmacy will follow daily and adjust medications as appropriate for renal function and/or serum levels.     Thank you,  Luca Medley, PHARMD

## 2021-08-28 NOTE — PROGRESS NOTES
08/28/21 0288   ABCDEF Bundle   SBT Safety Screen Passed No   SBT Screen Reason for Failure FiO2 > 50%;PEEP > 7.5

## 2021-08-28 NOTE — PROGRESS NOTES
[facility]    ICU TEAM Progress Note    Name: Lauren Galdamez   : 1985   MRN: [id number]   Date: 2021           ICU Assessment & Plan of Care       Lauren Galdamez Is a 29 y.o. female with asthma who was  admitted for COVID-19 PNA. NEURO  Pain, agitation, sedation  -fent, versed,, ketamine  gtts for deep sedation, gets very hypoxic on slight movements. CARDIAC  Suspect septic shock at this time   On  vasopressors. Goal map of more than 65. Persistent bradycardia worsening this a.m       RESPIRATORY  COVID-19 PNA   cont LPV with goal 6cc/kg IBW, plat < 30, DP<15, pH < 7.2, pO2 60-90  - currently ACPC, 285 (~4cc/kg due to plats) / 30 / 14 / 100% PEEP 18  - s/p toci  - taper dexamethasone  -Not tolerating proning  3l+ yesterday - attempt gentle diuresis today for goal even  RENAL  Rhabdomyolysis  - D/c'd paralytic on   CK overall improving    GASTROINTESTINAL  Acute pancreatitis. Supportive care. - lipase trending down  US abd showed findings suggestive of acute cholecystitis. General surgery consulted and did not feel surgical intervention would help  tube feedings    HEMATOLOGIC  Started on full dose lovenox   - standard transfusion triggers    ID  Fever : suspect has sinusitis vs acute cholecystitis   Continue broad spectrum abx and diflucan    ENDOCRINE   Hyperglycemia  - SSI for now, but will likely need to add long-acting given increase in steroids  - currently at goal 140-180      DVT Prophylaxis: SCD, enoxaparin  U - Ulcer Prophylaxis: PPI  G - Glycemic Control: Insulin  B - Bowel Regimen: miralax  Tubes: ETT and Orogastric Tube  Lines: RIJ central line  Drains: Durbin Catheter   Prognosis terminal  Impending death    Unlikely to survive. Subjective:   Progress Note: 2021      Reason for ICU Admission: COVID-19 PNA     HPI: 29 y. o. female, with significant pmhx of asthma, who presents via EMS [facility] inpatient to the ED with report of decompensation over the last 12 hours due to covid PNA.  Over the course of the past 12 hours pt went from NC to Bipap to intubation for refractory hypoxemia.     Upon arrival to [facility] ED pt SPO2 in the 70's; however, as per EMS during transport she had to be manually ventilated with ambu bag and they did not have a peep valve. Initial ABG 7.35/44/58/25. Placed on peep of 15 with 100% FiO2. Will be admitted to the ICU. Hospital course  8/13 - admitted to ICU. ECMO consult called, but declined as currently too stable. Vent optimized and did not meet criteria for proning. 8/14 - HEYDI overnight. 8/15 - had done well during the day with PEEP down to 18, Fi down to 60%. Desat overnight with Fi back up to 100%. Pt increasingly restless (on same sedation), followed by self-extubation. Re-intubated but no reserves with prolonged hypoxia in 40s. Now sedated, paralyzed, and on epo.  8/16 - Patient was changed to supine position overnight and she had worsening hypoxemia and now on 100% Fio2. 8/17 - Patient again changed back to supine potion from prone position but had severe hypoxemic events which later improved with recruitment maneuvers. Still paralyzed with nimbex. 8/18. Weaned off of nimbex. Was in prone position overnight. On 100% Fio2 and PEEP of 18cm of water. Peak pressure ~32, pltp is 29.   8/19 - blood from ett and nose  8/21: remains on 100% fio2 , peep 18  No improvement in blood gases   8/22: after supining her , her o2 saturations were in the 30's for more than 1 hour  8/23: spiking high grade temps, slightly hypotensive  8/24: temp spikes have decreased but desaturates to 30's even just turning her.  8/25: Remains on 100% Fio2 and PEEP of 20cm of water. Still gets very hypoxic event on turning. sats now in high 80s. On 4mcg of levophed. 8/26: Remains on 100% Fio2 and PEEP of 20cm of water. Still gets very hypoxic event on turning. On 2mcg of levophed. Spiking temps up to 101F.  White count stable. Reculture. 8/27: 8/27: Remains on 100% Fio2 and PEEP of 20cm of water. Still gets very hypoxic event on turning. On 4mcg of levophed. Temps improving.  8/28 intermittent bradycardia today 30s-40s      Home Medications:     Prior to Admission medications    Medication Sig Start Date End Date Taking? Authorizing Provider   lidocaine (LIDODERM) 5 % 1 Patch by TransDERmal route daily as needed (back pain). Apply patch to the affected area for 12 hours a day and remove for 12 hours a day. Provider, Sasha   ProAir HFA 90 mcg/actuation inhaler Take 2 Puffs by inhalation every four (4) hours as needed for Wheezing, Shortness of Breath or Cough. 7/30/21   Grazyna Yanes NP   fluticasone propionate (FLOVENT HFA) 110 mcg/actuation inhaler Take 1 Puff by inhalation every twelve (12) hours. 7/30/21   Grazyna Yanes NP   montelukast (SINGULAIR) 10 mg tablet Take 1 Tablet by mouth daily. 7/30/21   Grazyna Yanes NP   famotidine (PEPCID) 20 mg tablet Take 1 Tablet by mouth two (2) times a day. 7/30/21   Grazyna Yanes NP   albuterol (PROVENTIL VENTOLIN) 2.5 mg /3 mL (0.083 %) nebu Take 3 mL by inhalation every four (4) hours as needed for Wheezing, Shortness of Breath or Cough.  6/7/21   Grazyna Yanes NP       Current Meds:     Current Facility-Administered Medications   Medication Dose Route Frequency    vancomycin (VANCOCIN) 1250 mg in  ml infusion  1,250 mg IntraVENous Q12H    polyethylene glycol (MIRALAX) packet 17 g  17 g Per G Tube DAILY    midazolam (VERSED) 250 mg in 0.9% sodium chloride 250 mL infusion  0-15 mg/hr IntraVENous TITRATE    dexmedeTOMidine in 0.9 % NaCl (PRECEDEX) 400 mcg/100 mL (4 mcg/mL) infusion soln  0.1-1.5 mcg/kg/hr IntraVENous TITRATE    enoxaparin (LOVENOX) injection 40 mg  40 mg SubCUTAneous Q12H    insulin glargine (LANTUS) injection 15 Units  15 Units SubCUTAneous Q24H    white petrolatum-mineral oiL (SOOTHE NIGHT TIME) 80-20 % ophthalmic ointment   Both Eyes PRN    dexamethasone (DECADRON) 4 mg/mL injection 9 mg  9 mg IntraVENous Q24H    NOREPINephrine (LEVOPHED) 8 mg in 5% dextrose 250mL (32 mcg/mL) infusion  0.5-16 mcg/min IntraVENous TITRATE    cefepime (MAXIPIME) 2 g in 0.9% sodium chloride (MBP/ADV) 100 mL MBP  2 g IntraVENous Q8H    metroNIDAZOLE (FLAGYL) IVPB premix 500 mg  500 mg IntraVENous Q12H    fluconazole (DIFLUCAN) 400mg/200 mL IVPB (premix)  400 mg IntraVENous Q24H    alcohol 62% (NOZIN) nasal  1 Ampule  1 Ampule Topical Q12H    senna-docusate (PERICOLACE) 8.6-50 mg per tablet 1 Tablet  1 Tablet Per G Tube Q12H    balsam peru-castor oiL (VENELEX) ointment   Topical BID    clonazePAM (KlonoPIN) tablet 2 mg  2 mg Per G Tube TID    oxyCODONE (ROXICODONE) 5 mg/5 mL oral solution 30 mg  30 mg Per G Tube Q8H    LORazepam (ATIVAN) injection 4 mg  4 mg IntraVENous Q4H PRN    chlorhexidine (ORAL CARE KIT) 0.12 % mouthwash 15 mL  15 mL Oral Q12H    fentaNYL (PF) 1,500 mcg/30 mL (50 mcg/mL) infusion  0-300 mcg/hr IntraVENous TITRATE    sodium chloride (NS) flush 5-40 mL  5-40 mL IntraVENous Q8H    sodium chloride (NS) flush 5-40 mL  5-40 mL IntraVENous PRN    sodium chloride (NS) flush 5-40 mL  5-40 mL IntraVENous Q8H    sodium chloride (NS) flush 5-40 mL  5-40 mL IntraVENous PRN    acetaminophen (TYLENOL) suppository 650 mg  650 mg Rectal Q6H PRN    ondansetron (ZOFRAN ODT) tablet 4 mg  4 mg Oral Q8H PRN    Or    ondansetron (ZOFRAN) injection 4 mg  4 mg IntraVENous Q6H PRN    acetaminophen (TYLENOL) solution 650 mg  650 mg Oral Q4H PRN    albuterol-ipratropium (DUO-NEB) 2.5 MG-0.5 MG/3 ML  3 mL Nebulization Q4H PRN    pantoprazole (PROTONIX) 40 mg in 0.9% sodium chloride 10 mL injection  40 mg IntraVENous DAILY    budesonide (PULMICORT) 250 mcg/2ml nebulizer susp  250 mcg Nebulization BID RT    glucose chewable tablet 16 g  4 Tablet Oral PRN    dextrose (D50W) injection syrg 12.5-25 g  25-50 mL IntraVENous PRN    glucagon (GLUCAGEN) injection 1 mg  1 mg IntraMUSCular PRN    insulin lispro (HUMALOG) injection   SubCUTAneous Q6H       Objective:   Vital Signs:  Visit Vitals  BP (!) 114/56   Pulse (!) 42   Temp 96.8 °F (36 °C)   Resp 12   Ht 5' 5\" (1.651 m)   Wt 151.9 kg (334 lb 14.1 oz)   SpO2 92%   BMI 55.73 kg/m²    O2 Flow Rate (L/min): 5 l/min O2 Device: Ventilator Temp (24hrs), Av.8 °F (36.6 °C), Min:96.8 °F (36 °C), Max:98.3 °F (36.8 °C)           Intake/Output:     Intake/Output Summary (Last 24 hours) at 2021 1350  Last data filed at 2021 1200  Gross per 24 hour   Intake 294 ml   Output 1050 ml   Net -756 ml       Physical Exam:  Supine, intubated, sedated, opens eyes spontaneously  ETT in place, RIJ CVC  Resps even and unlabored, symmetric chest rise on MV  Reg rate, no heave/rub  Peripheral pulses intact  Abd soft, obese, nontender  Skin warm, dry    LABS AND  DATA: Personally reviewed  Recent Labs     21   WBC 16.6* 12.4*   HGB 10.0* 9.4*   HCT 34.3* 31.7*    179     Recent Labs     21    148*   K 4.4 4.3   * 116*   CO2 29 29   BUN 28* 30*   CREA 0.98 0.85   * 200*   CA 8.7 8.6   MG 2.5* 2.6*   PHOS 3.1 3.0     Recent Labs     21  0521   AP 56 44*   < > 40*   TP 6.8 6.4   < > 6.3*   ALB 3.1* 3.0*   < > 2.6*   GLOB 3.7 3.4   < > 3.7   LPSE  --   --   --  1,991*    < > = values in this interval not displayed. No results for input(s): INR, PTP, APTT, INREXT, INREXT in the last 72 hours. No results for input(s): PHI, PCO2I, PO2I, FIO2I in the last 72 hours.   Recent Labs     21  0618 21  0512   * 593*         Ventilator Settings:  Mode Rate Tidal Volume Pressure FiO2 PEEP   Pressure control   270 ml    100 % 20 cm H20     Peak airway pressure: 39 cm H2O    Minute ventilation: 10.1 l/min      Multidisciplinary Rounds Completed:  No    ABCDEF Bundle/Checklist Completed:  Yes    SPECIAL EQUIPMENT  ventilator    DISPOSITION  Stay in ICU    CRITICAL CARE CONSULTANT NOTE  I had a face to face encounter with the patient, reviewed and interpreted patient data including clinical events, labs, images, vital signs, I/O's, and examined patient. I have discussed the case and the plan and management of the patient's care with the consulting services, the bedside nurses and the respiratory therapist.      NOTE OF PERSONAL INVOLVEMENT IN CARE   This patient has a high probability of imminent, clinically significant deterioration, which requires the highest level of preparedness to intervene urgently. I participated in the decision-making and personally managed or directed the management of the following life and organ supporting interventions that required my frequent assessment to treat or prevent imminent deterioration. I personally spent 50 minutes of critical care time. This is time spent at this critically ill patient's bedside actively involved in patient care as well as the coordination of care. This does not include any procedural time which has been billed separately.     Kaykay Steen  [facility]  [phone]      8/28/2021

## 2021-08-29 NOTE — PROGRESS NOTES
08/29/21 0634   ABCDEF Bundle   SBT Safety Screen Passed No   SBT Screen Reason for Failure FiO2 > 50%;PEEP > 7.5

## 2021-08-29 NOTE — PROGRESS NOTES
[facility]    ICU TEAM Progress Note    Name: Xin Salamanca   : 1985   MRN: [id number]   Date: 2021           ICU Assessment & Plan of Care       Xin Salamanca Is a 29 y.o. female with asthma who was  admitted for COVID-19 PNA. NEURO  Pain, agitation, sedation  -fent, versed,, ketamine  gtts for deep sedation, gets very hypoxic on slight movements. CARDIAC  Suspect septic shock at this time   On  vasopressors. Goal map of more than 65. Intermittent bradycardia to the 40s somewhat improved from yesterday      RESPIRATORY  COVID-19 PNA   cont LPV with goal 6cc/kg IBW, plat < 30, DP<15, pH < 7.2, pO2 60-90  - currently ACPC, 285 (~4cc/kg due to plats) /  / 14 / 100% PEEP 18  - s/p toci  - taper dexamethasone  -Not tolerating proning  Continue gentle diuresis today for goal even  RENAL  Rhabdomyolysis  - D/c'd paralytic on   CK overall improving    GASTROINTESTINAL  Acute pancreatitis. Supportive care. - lipase trending down  US abd showed findings suggestive of acute cholecystitis. General surgery consulted and did not feel surgical intervention would help  Tolerating tube feeds    HEMATOLOGIC  Started on full dose lovenox   - standard transfusion triggers    ID  Fever : suspect has sinusitis vs acute cholecystitis   Continue broad spectrum abx and diflucan    ENDOCRINE   Hyperglycemia  - SSI for now, but will likely need to add long-acting given increase in steroids  - currently at goal 140-180      DVT Prophylaxis: SCD, enoxaparin  U - Ulcer Prophylaxis: PPI  G - Glycemic Control: Insulin  B - Bowel Regimen: miralax  Tubes: ETT and Orogastric Tube  Lines: RIJ central line  Drains: Durbin Catheter   Prognosis terminal  Impending death    Unlikely to survive. Subjective:   Progress Note: 2021      Reason for ICU Admission: COVID-19 PNA     HPI: 29 y. o. female, with significant pmhx of asthma, who presents via EMS [facility] inpatient to the ED with report of decompensation over the last 12 hours due to covid PNA.  Over the course of the past 12 hours pt went from NC to Bipap to intubation for refractory hypoxemia.     Upon arrival to [facility] ED pt SPO2 in the 70's; however, as per EMS during transport she had to be manually ventilated with ambu bag and they did not have a peep valve. Initial ABG 7.35/44/58/25. Placed on peep of 15 with 100% FiO2. Will be admitted to the ICU. Hospital course  8/13 - admitted to ICU. ECMO consult called, but declined as currently too stable. Vent optimized and did not meet criteria for proning. 8/14 - HEYDI overnight. 8/15 - had done well during the day with PEEP down to 18, Fi down to 60%. Desat overnight with Fi back up to 100%. Pt increasingly restless (on same sedation), followed by self-extubation. Re-intubated but no reserves with prolonged hypoxia in 40s. Now sedated, paralyzed, and on epo.  8/16 - Patient was changed to supine position overnight and she had worsening hypoxemia and now on 100% Fio2. 8/17 - Patient again changed back to supine potion from prone position but had severe hypoxemic events which later improved with recruitment maneuvers. Still paralyzed with nimbex. 8/18. Weaned off of nimbex. Was in prone position overnight. On 100% Fio2 and PEEP of 18cm of water. Peak pressure ~32, pltp is 29.   8/19 - blood from ett and nose  8/21: remains on 100% fio2 , peep 18  No improvement in blood gases   8/22: after supining her , her o2 saturations were in the 30's for more than 1 hour  8/23: spiking high grade temps, slightly hypotensive  8/24: temp spikes have decreased but desaturates to 30's even just turning her.  8/25: Remains on 100% Fio2 and PEEP of 20cm of water. Still gets very hypoxic event on turning. sats now in high 80s. On 4mcg of levophed. 8/26: Remains on 100% Fio2 and PEEP of 20cm of water. Still gets very hypoxic event on turning. On 2mcg of levophed. Spiking temps up to 101F.  White count stable. Reculture. 8/27: 8/27: Remains on 100% Fio2 and PEEP of 20cm of water. Still gets very hypoxic event on turning. On 4mcg of levophed. Temps improving.  8/28 intermittent bradycardia today 30s-40s      Home Medications:     Prior to Admission medications    Medication Sig Start Date End Date Taking? Authorizing Provider   lidocaine (LIDODERM) 5 % 1 Patch by TransDERmal route daily as needed (back pain). Apply patch to the affected area for 12 hours a day and remove for 12 hours a day. Provider, Sasha   ProAir HFA 90 mcg/actuation inhaler Take 2 Puffs by inhalation every four (4) hours as needed for Wheezing, Shortness of Breath or Cough. 7/30/21   Guera Crain NP   fluticasone propionate (FLOVENT HFA) 110 mcg/actuation inhaler Take 1 Puff by inhalation every twelve (12) hours. 7/30/21   Guera Crain NP   montelukast (SINGULAIR) 10 mg tablet Take 1 Tablet by mouth daily. 7/30/21   Guera Crain NP   famotidine (PEPCID) 20 mg tablet Take 1 Tablet by mouth two (2) times a day. 7/30/21   Guera Crain NP   albuterol (PROVENTIL VENTOLIN) 2.5 mg /3 mL (0.083 %) nebu Take 3 mL by inhalation every four (4) hours as needed for Wheezing, Shortness of Breath or Cough.  6/7/21   Guera Crain NP       Current Meds:     Current Facility-Administered Medications   Medication Dose Route Frequency    vancomycin (VANCOCIN) 1250 mg in  ml infusion  1,250 mg IntraVENous Q12H    polyethylene glycol (MIRALAX) packet 17 g  17 g Per G Tube DAILY    midazolam (VERSED) 250 mg in 0.9% sodium chloride 250 mL infusion  0-15 mg/hr IntraVENous TITRATE    dexmedeTOMidine in 0.9 % NaCl (PRECEDEX) 400 mcg/100 mL (4 mcg/mL) infusion soln  0.1-1.5 mcg/kg/hr IntraVENous TITRATE    enoxaparin (LOVENOX) injection 40 mg  40 mg SubCUTAneous Q12H    insulin glargine (LANTUS) injection 15 Units  15 Units SubCUTAneous Q24H    white petrolatum-mineral oiL (SOOTHE NIGHT TIME) 80-20 % ophthalmic ointment   Both Eyes PRN    dexamethasone (DECADRON) 4 mg/mL injection 9 mg  9 mg IntraVENous Q24H    NOREPINephrine (LEVOPHED) 8 mg in 5% dextrose 250mL (32 mcg/mL) infusion  0.5-16 mcg/min IntraVENous TITRATE    cefepime (MAXIPIME) 2 g in 0.9% sodium chloride (MBP/ADV) 100 mL MBP  2 g IntraVENous Q8H    metroNIDAZOLE (FLAGYL) IVPB premix 500 mg  500 mg IntraVENous Q12H    fluconazole (DIFLUCAN) 400mg/200 mL IVPB (premix)  400 mg IntraVENous Q24H    alcohol 62% (NOZIN) nasal  1 Ampule  1 Ampule Topical Q12H    senna-docusate (PERICOLACE) 8.6-50 mg per tablet 1 Tablet  1 Tablet Per G Tube Q12H    balsam peru-castor oiL (VENELEX) ointment   Topical BID    clonazePAM (KlonoPIN) tablet 2 mg  2 mg Per G Tube TID    oxyCODONE (ROXICODONE) 5 mg/5 mL oral solution 30 mg  30 mg Per G Tube Q8H    LORazepam (ATIVAN) injection 4 mg  4 mg IntraVENous Q4H PRN    chlorhexidine (ORAL CARE KIT) 0.12 % mouthwash 15 mL  15 mL Oral Q12H    fentaNYL (PF) 1,500 mcg/30 mL (50 mcg/mL) infusion  0-300 mcg/hr IntraVENous TITRATE    sodium chloride (NS) flush 5-40 mL  5-40 mL IntraVENous Q8H    sodium chloride (NS) flush 5-40 mL  5-40 mL IntraVENous PRN    sodium chloride (NS) flush 5-40 mL  5-40 mL IntraVENous Q8H    sodium chloride (NS) flush 5-40 mL  5-40 mL IntraVENous PRN    acetaminophen (TYLENOL) suppository 650 mg  650 mg Rectal Q6H PRN    ondansetron (ZOFRAN ODT) tablet 4 mg  4 mg Oral Q8H PRN    Or    ondansetron (ZOFRAN) injection 4 mg  4 mg IntraVENous Q6H PRN    acetaminophen (TYLENOL) solution 650 mg  650 mg Oral Q4H PRN    albuterol-ipratropium (DUO-NEB) 2.5 MG-0.5 MG/3 ML  3 mL Nebulization Q4H PRN    pantoprazole (PROTONIX) 40 mg in 0.9% sodium chloride 10 mL injection  40 mg IntraVENous DAILY    budesonide (PULMICORT) 250 mcg/2ml nebulizer susp  250 mcg Nebulization BID RT    glucose chewable tablet 16 g  4 Tablet Oral PRN    dextrose (D50W) injection syrg 12.5-25 g  25-50 mL IntraVENous PRN    glucagon (GLUCAGEN) injection 1 mg  1 mg IntraMUSCular PRN    insulin lispro (HUMALOG) injection   SubCUTAneous Q6H       Objective:   Vital Signs:  Visit Vitals  BP (!) 129/58   Pulse (!) 51   Temp 99 °F (37.2 °C)   Resp (!) 35   Ht 5' 5\" (1.651 m)   Wt 151.9 kg (334 lb 14.1 oz)   SpO2 (!) 80%   BMI 55.73 kg/m²    O2 Flow Rate (L/min): 5 l/min O2 Device: Ventilator Temp (24hrs), Av.3 °F (36.8 °C), Min:96.6 °F (35.9 °C), Max:99.8 °F (37.7 °C)           Intake/Output:     Intake/Output Summary (Last 24 hours) at 2021 0817  Last data filed at 2021 0630  Gross per 24 hour   Intake 2264.99 ml   Output 4535 ml   Net -2270.01 ml       Physical Exam:  Supine, intubated, sedated, opens eyes spontaneously  ETT in place, RIJ CVC  Resps even and unlabored, symmetric chest rise on MV  Reg rate, no heave/rub  Peripheral pulses intact  Abd soft, obese, nontender  Skin warm, dry    LABS AND  DATA: Personally reviewed  Recent Labs     21   WBC 16.0* 16.6*   HGB 10.0* 10.0*   HCT 32.8* 34.3*    208     Recent Labs     21    143   K 4.2 4.4    112*   CO2 30 29   BUN 28* 28*   CREA 0.95 0.98   * 235*   CA 9.0 8.7   MG 2.2 2.5*   PHOS 3.2 3.1     Recent Labs     21  06   AP 60 56   TP 6.9 6.8   ALB 3.0* 3.1*   GLOB 3.9 3.7   LPSE 1,348*  --      No results for input(s): INR, PTP, APTT, INREXT, INREXT in the last 72 hours. No results for input(s): PHI, PCO2I, PO2I, FIO2I in the last 72 hours.   Recent Labs     21  0434 21  0618   * 416*         Ventilator Settings:  Mode Rate Tidal Volume Pressure FiO2 PEEP   Pressure control   270 ml    100 % 20 cm H20     Peak airway pressure: 39 cm H2O    Minute ventilation: 12.3 l/min      Multidisciplinary Rounds Completed:  No    ABCDEF Bundle/Checklist Completed:  Yes    SPECIAL EQUIPMENT  ventilator    DISPOSITION  Stay in ICU    CRITICAL CARE CONSULTANT NOTE  I had a face to face encounter with the patient, reviewed and interpreted patient data including clinical events, labs, images, vital signs, I/O's, and examined patient. I have discussed the case and the plan and management of the patient's care with the consulting services, the bedside nurses and the respiratory therapist.      NOTE OF PERSONAL INVOLVEMENT IN CARE   This patient has a high probability of imminent, clinically significant deterioration, which requires the highest level of preparedness to intervene urgently. I participated in the decision-making and personally managed or directed the management of the following life and organ supporting interventions that required my frequent assessment to treat or prevent imminent deterioration. I personally spent 40 minutes of critical care time. This is time spent at this critically ill patient's bedside actively involved in patient care as well as the coordination of care. This does not include any procedural time which has been billed separately.     Kati Ramirez  [facility]  [phone]      8/29/2021

## 2021-08-30 NOTE — PROGRESS NOTES
Interim ICU progress note:     Over the course of the evening/night, Ms Huber Farr demonstrated progressively worsening hypoxia. Around 2230, she exhibited an air leak with volume loss on mechanical ventilation with desaturations to the 60s. Her best saturations prior to this were in the high 70s, low 80s. Cuff was inflated, but began to lose air and volumes within about a half hour. CXR obtained and ETT had migrated out to the thoracic inlet. ETT advanced by 4cm. Repeat CXR showed ETT 3.5cm above the tyler. 50mg rizwana and 10mg versed given to improve ventilator compliance. O2 sats improved to 80%. Volumes improved on vent. Air leak resolved. Patient had demonstrated desaturations with any coughing, so decision was made to continue paralytic. Given patient was already on high doses of fentanyl and versed, ketamine was added and then nimbex was added for paralytic. Around 0120, patient developed tachycardia to 150s, followed by a self limited run of VT.  ECG ordered and pending. 15mg versed given out of initial concern for breakthrough sedation. Repeat labs ordered including VBG (no arterial line and hypoxia is known with maximized vent settings). Lactic acid ordered. Amiodarone bolus/gtt ordered. Diagnosis: Severe ARDS secondary to COVID-19 pneumonia    Cinthya Guallpa NP    CRITICAL CARE CONSULTANT ATTESTATION  I had a face to face encounter with the patient, reviewed and interpreted patient data including clinical events, labs, images, vital signs, I/O's, and examined patient. I have discussed the case and the plan and management of the patient's care with the consulting services, the bedside nurses and the respiratory therapist.     NOTE OF PERSONAL INVOLVEMENT IN CARE   This patient has a high probability of imminent, clinically significant deterioration, which requires the highest level of preparedness to intervene urgently.  I participated in the decision-making and personally managed or directed the management of the following life and organ supporting interventions that required my frequent assessment to treat or prevent imminent deterioration. I personally spent 45 minutes of critical care time. This is time spent at this critically ill patient's bedside actively involved in patient care as well as the coordination of care and discussions with the patient's family. This does not include any procedural time which has been billed separately.     Evelia Hernandez NP  [facility]  8/30/2021

## 2021-08-30 NOTE — ROUTINE PROCESS
1930: Bedside and Verbal shift change report received from BELEN Gabriel (offgoing nurse). Report included the following information SBAR, Kardex, ED Summary, Procedure Summary, Intake/Output, MAR, Accordion, Recent Results, Med Rec Status, Cardiac Rhythm sinus rhythm, Alarm Parameters  and Quality Measures. She continues to have an air leak from the Mechanical Ventilator; Nurse Eugenia Leon RN noted the change in the placement. Anh Cutler RT made aware of the tube placement. Levophed remains in-use to support her blood pressure. The Versed drip is at 15 mg/hour. The Fentanyl is at it's maximum dosage of 300 mcq/hour  2000: Skin/Mela care completed, and suctioned for a small amount of thick yellow sputum She has excessive oral secretions, and yellow drainage from her nares. She is tolerating the small dose of the tube feeding. 2230:Noted with a persistent air leak and a low oxygen saturation level, Alonso NP at the bedside for an evaluation. See new orders for Versed, and Rocuronium. Also, a stat CXR.   2300: PCXR completed, planning to advance the ET tube. Bedside and Verbal shift change report given to KEELY Qureshi RN  (oncoming nurse) by myself (offgoing nurse). Report included the following information SBAR, Kardex, ED Summary, Procedure Summary, Intake/Output, MAR, Accordion, Recent Results, Med Rec Status, Cardiac Rhythm sinus rhythm , Alarm Parameters  and Quality Measures.

## 2021-08-30 NOTE — PROGRESS NOTES
2300: Bedside report received from [address]. Dual skin assessment completed at this time . Brice Cabrera at bedside for desaturation, Stat chest XRay noted that ETT was not in the correct place. Tube to be to be advanced by respiratory. 0000: Shift assessment complete. See flowsheets    [id number]: Patient went into rapid rhythm HR in the 150s., Brice Cabrera notified. Patient became hypotensive SBP in the 50s. Levo titrated rapidly (see MAR) per Brice Cabrera. Verbal order to turn levo to 100 mcg/min. Unable to obtain blood pressure on any extremity. Pulse is very weak. Family called and are on their way to come say goodbye to patient before expiration. 0235: Patient . Pronounced by Brice Cabrera.  Family at bedside

## 2021-08-30 NOTE — DISCHARGE SUMMARY
Discharge Summary     Patient: Manjit Garcia MRN: [id number]  SSN: [id number]    YOB: 1985  Age: 29 y.o. Sex: female       Admit Date: 2021    Discharge Date: 2021     Date/time of death: 2021 0234     Admission Diagnoses: Acute hypoxemic respiratory failure due to COVID-19 [facility]) [U07.1, J96.01]    Hospital Course in brief: Ms Manjit Garcia was a 28 yo female w a PMH of asthma who presented via EMS on  from [facility] for management of COVID pneumonia. At [address] [place] [address], she had been admitted on  for COVID pneumonia, initially requiring NC, decompensating quickly requiring BiPAP, then intubation for refractory hypoxemia. Upon arrival to [address] [place] [address] ED, her sats were in the 76s with ABG 7.35/44/58/25. She was placed on 100% FIo2 PEEP 15 and was admitted to the ICU. She demonstrated initial improvement and FIo2 was decreased to 60%. She did not initially meet criteria for proning. She did receive toci and dexamethasone. She self-extubated on 8/15 during a period of agitation and required reintubation. She experienced derecruitment and required sedation to RASS -5 with paralysis and proning. She was deemed a poor candidate for ECMO with BMI 55 and this was not pursued. Paralysis was stopped . Her course was complicated by epistaxis . Proning was terminated on  due to refractory hypoxia with any movement. As of , she required 100% FIo2 and 20cm H20 PEEP (w 4cc/kg TV due to high plat pressure) to maintain sats 80s. Goals of care discussions were held with family and she was made a DNR. Her sats continued to remain in the 80s until  despite diuresis. On , she acutely decompensated, demonstrating sats in the 60s-70s, developed acute hypotension, and SVT. VBG 7.1/82/18. RR increased to 32. Levophed added at 100mcg/min. She continued to exhibit worsening hypotension and ultimately  with family at the bedside.     Hospital course, by problem:  Severe ARDS/Acute hypoxic respiratory failure secondary to COVID-19 Pneumonia requiring mechanical ventilation: Received full dose Lovenox for anticoagulation. 4cc/kg TV due to high plat pressure. Required 100% FIo2 20cm H20 PEEP with PF <100. Sedated/paralyzed. Initially proned as above, but then did not tolerate proning due to instability. Not an ECMO candidate. Received toci and steroids. Septic shock w lactic acidosis: Coag negative staph likely contaminant in blood cultures. Other cx NGTD. Sinusitis vs intra-abd origin. Tx with broad spectrum abx. MADAI w hyperkalemia/hyperphosphatemia: Occurring in setting of septic shock. Poor HD candidate  Hyperglycemia: Likely steroid induced. No hx DM  Pancreatitis/Acute cholecystitis: Lipase began to trend down, tolerated TF. Abdominal ultrasound c/f cholecystitis. Surgery consulted and did not feel acute surgical intervention helpful at that time.    Morbid obesity: Contributed to overall poor prognosis  SVT: Treated with amio     Additional Diagnoses Treated During This Hospitalization:   Severe ARDS/Acute hypoxic respiratory failure secondary to COVID-19 Pneumonia requiring mechanical ventilation  Septic shock w lactic acidosis  MADAI w hyperkalemia/hyperphosphatemia  Pancreatitis  Acute cholecystitis  SVT  Bradycardia  Agitation  Acute encephalopathy  Respiratory acidosis  Metabolic acidosis  Rhabdomyolysis  Hyperglycemia  Deconditioning  Morbid obesity    Discharge Condition:     Signed By: Nohemi Solano NP     2021

## 2021-08-30 NOTE — PROGRESS NOTES
Spiritual Care Assessment/Progress Note  [facility]      NAME: Enrike Scruggs      MRN: [id number]  AGE: 29 y.o. SEX: female  Adventism Affiliation: Adventism   Language: English     2021     Total Time (in minutes): 10     Spiritual Assessment begun in [facility] CRITICAL CARE 3 through conversation with:         []Patient        [] Family    [] Friend(s)        Reason for Consult: Death, Inpatient     Spiritual beliefs: (Please include comment if needed)     [] Identifies with a nita tradition:         [] Supported by a nita community:            [] Claims no spiritual orientation:           [] Seeking spiritual identity:                [] Adheres to an individual form of spirituality:           [x] Not able to assess:                           Identified resources for coping:      [] Prayer                               [] Music                  [] Guided Imagery     [] Family/friends                 [] Pet visits     [] Devotional reading                         [x] Unknown     [] Other:                                               Interventions offered during this visit: (See comments for more details)    Patient Interventions: Other (comment) (Pt )           Plan of Care:     [] Support spiritual and/or cultural needs    [] Support AMD and/or advance care planning process      [x] Support grieving process   [] Coordinate Rites and/or Rituals    [] Coordination with community clergy   [] No spiritual needs identified at this time   [] Detailed Plan of Care below (See Comments)  [] Make referral to Music Therapy  [] Make referral to Pet Therapy     [] Make referral to Addiction services  [] Make referral to [facility]  [] Make referral to Spiritual Care Partner  [] No future visits requested        [] Follow up upon further referrals     Comments:      responded to the death notification of Ms. Shawnee Nassar in . According to bedside RN, family was at bedside.  Upon arrival, family was gone.  checked with bedside RN Conrad Pickett, she answered family was upset, left the room as soon as the patient passed. Debriefed with bedside BELEN Pickett. Contact  if further needs arise.     [id number] Adriane Jose,Th.M, [address] Provider   Paging Service [phone])

## 2021-08-30 NOTE — DEATH NOTE
Death Declaration          [address]    Pt Name  Ana Draper   Admit date:  8/13/2021   Date and time of death:  8/30/2021 0234   Room Number  2514/01    Medical Record Number  [id number] @ [facility]   Age  29 y.o. Date of Birth 1985   PCP Xuan Mckeon NP   Attending physician Edmundo Felipe MD      Code Status  DNR     Patient seen and examined  Yes   Mental status   Unresponsive    Pupils Dilated and Fixed    Respiration Nil     Pulse  Absent      Heart Sounds  Absent     Rhythm   Flat line    Family  Notified by Vonnie Shankar   [facility]  Notified by Nursing staff      Death certificate and discharge summary completion remain  Dr. Edmundo Felipe MD's responsibility.

## 2021-08-30 NOTE — PROGRESS NOTES
VBG 7.100/82/18 - increased rate from 28 to 32. Have not administered sodium bicarbonate out of concern of ultimately increasing PCO2. BP continues to decline despite increasing vasopressors. Called patient's mother and told her that patient is likely actively dying.   Her mother expressed understanding and either the mother or patient's sister are en route to hospital.    Milderd Seats, NP

## 2022-11-21 NOTE — ED TRIAGE NOTES
Patient reports back pain for \"a long time. \" Double Island Pedicle Flap Text: The defect edges were debeveled with a #15 scalpel blade.  Given the location of the defect, shape of the defect and the proximity to free margins a double island pedicle advancement flap was deemed most appropriate.  Using a sterile surgical marker, an appropriate advancement flap was drawn incorporating the defect, outlining the appropriate donor tissue and placing the expected incisions within the relaxed skin tension lines where possible.    The area thus outlined was incised deep to adipose tissue with a #15 scalpel blade.  The skin margins were undermined to an appropriate distance in all directions around the primary defect and laterally outward around the island pedicle utilizing iris scissors.  There was minimal undermining beneath the pedicle flap.

## 2023-05-31 NOTE — PROGRESS NOTES
Patient placed in prone position with pillow under patient face. Et tube was taped and no skin break down on patient face. Walk in